# Patient Record
Sex: FEMALE | Race: WHITE | Employment: OTHER | URBAN - METROPOLITAN AREA
[De-identification: names, ages, dates, MRNs, and addresses within clinical notes are randomized per-mention and may not be internally consistent; named-entity substitution may affect disease eponyms.]

---

## 2017-01-12 ENCOUNTER — OFFICE VISIT (OUTPATIENT)
Dept: DERMATOLOGY | Age: 57
End: 2017-01-12

## 2017-01-12 DIAGNOSIS — L21.9 SEBORRHEIC DERMATITIS: ICD-10-CM

## 2017-01-12 DIAGNOSIS — D48.5 NEOPLASM OF UNCERTAIN BEHAVIOR OF SKIN: Primary | ICD-10-CM

## 2017-01-12 DIAGNOSIS — D22.9 BENIGN NEVUS: ICD-10-CM

## 2017-01-12 DIAGNOSIS — L82.0 SEBORRHEIC KERATOSES, INFLAMED: ICD-10-CM

## 2017-01-12 PROCEDURE — 99213 OFFICE O/P EST LOW 20 MIN: CPT | Performed by: DERMATOLOGY

## 2017-01-12 PROCEDURE — 17110 DESTRUCTION B9 LES UP TO 14: CPT | Performed by: DERMATOLOGY

## 2017-01-27 ENCOUNTER — HOSPITAL ENCOUNTER (OUTPATIENT)
Dept: MAMMOGRAPHY | Age: 57
Discharge: HOME | End: 2017-01-27
Attending: OBSTETRICS & GYNECOLOGY | Admitting: OBSTETRICS & GYNECOLOGY

## 2017-01-27 DIAGNOSIS — H92.10 OTORRHEA: ICD-10-CM

## 2017-01-27 DIAGNOSIS — Z12.31 ENCOUNTER FOR SCREENING MAMMOGRAM FOR BREAST CANCER: ICD-10-CM

## 2017-02-04 DIAGNOSIS — I10 UNSPECIFIED ESSENTIAL HYPERTENSION: ICD-10-CM

## 2017-02-04 DIAGNOSIS — E78.00 HYPERCHOLESTEREMIA: ICD-10-CM

## 2017-02-06 RX ORDER — VALSARTAN 40 MG/1
TABLET ORAL
Qty: 90 TABLET | Refills: 2 | Status: SHIPPED | OUTPATIENT
Start: 2017-02-06 | End: 2017-10-30 | Stop reason: SDUPTHER

## 2017-02-06 RX ORDER — ATORVASTATIN CALCIUM 20 MG/1
TABLET, FILM COATED ORAL
Qty: 30 TABLET | Refills: 2 | Status: SHIPPED | OUTPATIENT
Start: 2017-02-06 | End: 2017-05-09 | Stop reason: SDUPTHER

## 2017-02-06 RX ORDER — POTASSIUM CHLORIDE 1500 MG/1
TABLET, EXTENDED RELEASE ORAL
Qty: 90 TABLET | Refills: 2 | Status: SHIPPED | OUTPATIENT
Start: 2017-02-06 | End: 2017-10-30 | Stop reason: SDUPTHER

## 2017-02-06 RX ORDER — FUROSEMIDE 40 MG/1
TABLET ORAL
Qty: 90 TABLET | Refills: 2 | Status: SHIPPED | OUTPATIENT
Start: 2017-02-06 | End: 2017-10-30 | Stop reason: SDUPTHER

## 2017-02-09 ENCOUNTER — OFFICE VISIT (OUTPATIENT)
Dept: DERMATOLOGY | Age: 57
End: 2017-02-09

## 2017-02-09 DIAGNOSIS — D48.5 NEOPLASM OF UNCERTAIN BEHAVIOR OF SKIN: Primary | ICD-10-CM

## 2017-02-09 DIAGNOSIS — L21.9 SEBORRHEIC DERMATITIS: ICD-10-CM

## 2017-02-09 DIAGNOSIS — L72.0 MILIUM: ICD-10-CM

## 2017-02-09 DIAGNOSIS — D86.9 SARCOID: ICD-10-CM

## 2017-02-09 DIAGNOSIS — L57.0 AK (ACTINIC KERATOSIS): ICD-10-CM

## 2017-02-09 PROCEDURE — 11100 PR BIOPSY OF SKIN LESION: CPT | Performed by: DERMATOLOGY

## 2017-02-09 PROCEDURE — 17000 DESTRUCT PREMALG LESION: CPT | Performed by: DERMATOLOGY

## 2017-02-09 PROCEDURE — 99213 OFFICE O/P EST LOW 20 MIN: CPT | Performed by: DERMATOLOGY

## 2017-02-10 ENCOUNTER — TELEPHONE (OUTPATIENT)
Dept: DERMATOLOGY | Age: 57
End: 2017-02-10

## 2017-02-10 DIAGNOSIS — R21 RASH: Primary | ICD-10-CM

## 2017-02-15 LAB
ANAEROBIC CULTURE: NORMAL
GRAM STAIN RESULT: NORMAL
WOUND/ABSCESS: NORMAL

## 2017-02-16 ENCOUNTER — TELEPHONE (OUTPATIENT)
Dept: DERMATOLOGY | Age: 57
End: 2017-02-16

## 2017-02-17 ENCOUNTER — HOSPITAL ENCOUNTER (OUTPATIENT)
Dept: OTHER | Age: 57
Discharge: HOME | End: 2017-02-17
Attending: DERMATOLOGY | Admitting: DERMATOLOGY

## 2017-02-17 DIAGNOSIS — D86.9 SARCOID: ICD-10-CM

## 2017-02-17 LAB
A/G RATIO: 1.4 (ref 1.1–2.2)
ALBUMIN SERPL-MCNC: 4 G/DL (ref 3.4–5)
ALP BLD-CCNC: 63 U/L (ref 40–129)
ALT SERPL-CCNC: 18 U/L (ref 10–40)
ANION GAP SERPL CALCULATED.3IONS-SCNC: 10 MMOL/L (ref 3–16)
AST SERPL-CCNC: 25 U/L (ref 15–37)
BASOPHILS ABSOLUTE: 0 K/UL (ref 0–0.2)
BASOPHILS RELATIVE PERCENT: 0.5 %
BILIRUB SERPL-MCNC: 0.4 MG/DL (ref 0–1)
BUN BLDV-MCNC: 16 MG/DL (ref 7–20)
CALCIUM SERPL-MCNC: 9.4 MG/DL (ref 8.3–10.6)
CHLORIDE BLD-SCNC: 105 MMOL/L (ref 99–110)
CO2: 29 MMOL/L (ref 21–32)
CREAT SERPL-MCNC: 0.8 MG/DL (ref 0.6–1.1)
EOSINOPHILS ABSOLUTE: 0.1 K/UL (ref 0–0.6)
EOSINOPHILS RELATIVE PERCENT: 2.6 %
GFR AFRICAN AMERICAN: >60
GFR NON-AFRICAN AMERICAN: >60
GLOBULIN: 2.9 G/DL
GLUCOSE BLD-MCNC: 92 MG/DL (ref 70–99)
HCT VFR BLD CALC: 39.3 % (ref 36–48)
HEMOGLOBIN: 13.1 G/DL (ref 12–16)
LYMPHOCYTES ABSOLUTE: 1.6 K/UL (ref 1–5.1)
LYMPHOCYTES RELATIVE PERCENT: 33.6 %
MCH RBC QN AUTO: 27.6 PG (ref 26–34)
MCHC RBC AUTO-ENTMCNC: 33.4 G/DL (ref 31–36)
MCV RBC AUTO: 82.8 FL (ref 80–100)
MONOCYTES ABSOLUTE: 0.6 K/UL (ref 0–1.3)
MONOCYTES RELATIVE PERCENT: 12.1 %
NEUTROPHILS ABSOLUTE: 2.4 K/UL (ref 1.7–7.7)
NEUTROPHILS RELATIVE PERCENT: 51.2 %
PDW BLD-RTO: 13.4 % (ref 12.4–15.4)
PLATELET # BLD: 273 K/UL (ref 135–450)
PMV BLD AUTO: 7.6 FL (ref 5–10.5)
POTASSIUM SERPL-SCNC: 4.1 MMOL/L (ref 3.5–5.1)
RBC # BLD: 4.74 M/UL (ref 4–5.2)
SODIUM BLD-SCNC: 144 MMOL/L (ref 136–145)
TOTAL PROTEIN: 6.9 G/DL (ref 6.4–8.2)
WBC # BLD: 4.8 K/UL (ref 4–11)

## 2017-02-18 LAB — ANGIOTENSIN CONVERTING ENZYME: 46 U/L (ref 9–67)

## 2017-03-01 ENCOUNTER — HOSPITAL ENCOUNTER (OUTPATIENT)
Dept: CT IMAGING | Age: 57
Discharge: HOME | End: 2017-03-01
Attending: INTERNAL MEDICINE | Admitting: INTERNAL MEDICINE

## 2017-03-01 DIAGNOSIS — C18.2 MALIGNANT NEOPLASM OF ASCENDING COLON (HCC): ICD-10-CM

## 2017-03-01 DIAGNOSIS — C18.2 CANCER OF ASCENDING COLON (HCC): ICD-10-CM

## 2017-03-03 ENCOUNTER — OFFICE VISIT (OUTPATIENT)
Dept: FAMILY MEDICINE CLINIC | Age: 57
End: 2017-03-03

## 2017-03-03 VITALS
RESPIRATION RATE: 12 BRPM | HEART RATE: 81 BPM | BODY MASS INDEX: 26.94 KG/M2 | WEIGHT: 148.5 LBS | OXYGEN SATURATION: 98 % | DIASTOLIC BLOOD PRESSURE: 74 MMHG | SYSTOLIC BLOOD PRESSURE: 114 MMHG

## 2017-03-03 DIAGNOSIS — I10 ESSENTIAL HYPERTENSION: ICD-10-CM

## 2017-03-03 DIAGNOSIS — D86.3 CUTANEOUS SARCOIDOSIS (HCC): Primary | ICD-10-CM

## 2017-03-03 PROCEDURE — 93000 ELECTROCARDIOGRAM COMPLETE: CPT | Performed by: FAMILY MEDICINE

## 2017-03-03 PROCEDURE — 99214 OFFICE O/P EST MOD 30 MIN: CPT | Performed by: FAMILY MEDICINE

## 2017-03-08 ENCOUNTER — TELEPHONE (OUTPATIENT)
Dept: DERMATOLOGY | Age: 57
End: 2017-03-08

## 2017-03-08 ENCOUNTER — OFFICE VISIT (OUTPATIENT)
Dept: DERMATOLOGY | Age: 57
End: 2017-03-08

## 2017-03-08 DIAGNOSIS — D86.9 SARCOID: Primary | ICD-10-CM

## 2017-03-08 DIAGNOSIS — L21.9 SEBORRHEIC DERMATITIS: ICD-10-CM

## 2017-03-08 PROCEDURE — 99213 OFFICE O/P EST LOW 20 MIN: CPT | Performed by: DERMATOLOGY

## 2017-03-08 RX ORDER — TRIAMCINOLONE ACETONIDE 0.25 MG/G
CREAM TOPICAL
Qty: 30 G | Refills: 2 | Status: SHIPPED | OUTPATIENT
Start: 2017-03-08

## 2017-03-10 ENCOUNTER — HOSPITAL ENCOUNTER (OUTPATIENT)
Dept: ENDOSCOPY | Age: 57
Discharge: HOME | End: 2017-03-10
Attending: INTERNAL MEDICINE | Admitting: INTERNAL MEDICINE

## 2017-03-13 LAB — FUNGUS (MYCOLOGY) CULTURE: NORMAL

## 2017-03-28 LAB
AFB CULTURE (MYCOBACTERIA): NORMAL
AFB SMEAR: NORMAL

## 2017-04-06 ENCOUNTER — OFFICE VISIT (OUTPATIENT)
Dept: SURGERY | Age: 57
End: 2017-04-06

## 2017-04-06 VITALS — WEIGHT: 150 LBS | BODY MASS INDEX: 27.44 KG/M2 | DIASTOLIC BLOOD PRESSURE: 72 MMHG | SYSTOLIC BLOOD PRESSURE: 126 MMHG

## 2017-04-06 DIAGNOSIS — C18.2 CANCER OF ASCENDING COLON (HCC): Primary | ICD-10-CM

## 2017-04-06 PROCEDURE — 99213 OFFICE O/P EST LOW 20 MIN: CPT | Performed by: SURGERY

## 2017-04-20 ENCOUNTER — TELEPHONE (OUTPATIENT)
Dept: FAMILY MEDICINE CLINIC | Age: 57
End: 2017-04-20

## 2017-04-20 RX ORDER — CEPHALEXIN 500 MG/1
500 CAPSULE ORAL 3 TIMES DAILY
Qty: 30 CAPSULE | Refills: 0 | Status: SHIPPED | OUTPATIENT
Start: 2017-04-20 | End: 2017-05-11

## 2017-05-01 ENCOUNTER — OFFICE VISIT (OUTPATIENT)
Dept: DERMATOLOGY | Age: 57
End: 2017-05-01

## 2017-05-01 ENCOUNTER — TELEPHONE (OUTPATIENT)
Dept: DERMATOLOGY | Age: 57
End: 2017-05-01

## 2017-05-01 DIAGNOSIS — Z85.828 HISTORY OF BASAL CELL CANCER: ICD-10-CM

## 2017-05-01 DIAGNOSIS — L57.0 AK (ACTINIC KERATOSIS): ICD-10-CM

## 2017-05-01 DIAGNOSIS — L21.9 SEBORRHEIC DERMATITIS: ICD-10-CM

## 2017-05-01 DIAGNOSIS — D48.5 NEOPLASM OF UNCERTAIN BEHAVIOR OF SKIN: Primary | ICD-10-CM

## 2017-05-01 DIAGNOSIS — D86.9 SARCOID: ICD-10-CM

## 2017-05-01 PROCEDURE — 99214 OFFICE O/P EST MOD 30 MIN: CPT | Performed by: DERMATOLOGY

## 2017-05-01 PROCEDURE — 17000 DESTRUCT PREMALG LESION: CPT | Performed by: DERMATOLOGY

## 2017-05-01 PROCEDURE — 11900 INJECT SKIN LESIONS </W 7: CPT | Performed by: DERMATOLOGY

## 2017-05-01 PROCEDURE — 17003 DESTRUCT PREMALG LES 2-14: CPT | Performed by: DERMATOLOGY

## 2017-05-01 PROCEDURE — 11100 PR BIOPSY OF SKIN LESION: CPT | Performed by: DERMATOLOGY

## 2017-05-01 PROCEDURE — 11101 PR BIOPSY, EACH ADDED LESION: CPT | Performed by: DERMATOLOGY

## 2017-05-03 ENCOUNTER — TELEPHONE (OUTPATIENT)
Dept: DERMATOLOGY | Age: 57
End: 2017-05-03

## 2017-05-08 ENCOUNTER — TELEPHONE (OUTPATIENT)
Dept: DERMATOLOGY | Age: 57
End: 2017-05-08

## 2017-05-09 DIAGNOSIS — E78.00 HYPERCHOLESTEREMIA: ICD-10-CM

## 2017-05-09 RX ORDER — ATORVASTATIN CALCIUM 20 MG/1
TABLET, FILM COATED ORAL
Qty: 30 TABLET | Refills: 0 | Status: SHIPPED | OUTPATIENT
Start: 2017-05-09 | End: 2017-05-11 | Stop reason: SDUPTHER

## 2017-05-11 ENCOUNTER — OFFICE VISIT (OUTPATIENT)
Dept: FAMILY MEDICINE CLINIC | Age: 57
End: 2017-05-11

## 2017-05-11 VITALS
DIASTOLIC BLOOD PRESSURE: 70 MMHG | OXYGEN SATURATION: 98 % | BODY MASS INDEX: 27.34 KG/M2 | SYSTOLIC BLOOD PRESSURE: 100 MMHG | HEART RATE: 88 BPM | WEIGHT: 149.5 LBS | RESPIRATION RATE: 16 BRPM

## 2017-05-11 DIAGNOSIS — C18.2 CANCER OF ASCENDING COLON (HCC): ICD-10-CM

## 2017-05-11 DIAGNOSIS — E04.9 GOITER: ICD-10-CM

## 2017-05-11 DIAGNOSIS — I10 ESSENTIAL HYPERTENSION: Primary | ICD-10-CM

## 2017-05-11 DIAGNOSIS — E78.00 HYPERCHOLESTEREMIA: ICD-10-CM

## 2017-05-11 DIAGNOSIS — D86.3 CUTANEOUS SARCOIDOSIS (HCC): ICD-10-CM

## 2017-05-11 PROCEDURE — 99214 OFFICE O/P EST MOD 30 MIN: CPT | Performed by: FAMILY MEDICINE

## 2017-05-11 RX ORDER — ATORVASTATIN CALCIUM 20 MG/1
TABLET, FILM COATED ORAL
Qty: 30 TABLET | Refills: 11 | Status: SHIPPED | OUTPATIENT
Start: 2017-05-11 | End: 2018-05-14 | Stop reason: SDUPTHER

## 2017-05-11 ASSESSMENT — PATIENT HEALTH QUESTIONNAIRE - PHQ9
SUM OF ALL RESPONSES TO PHQ QUESTIONS 1-9: 0
2. FEELING DOWN, DEPRESSED OR HOPELESS: 0
SUM OF ALL RESPONSES TO PHQ9 QUESTIONS 1 & 2: 0
1. LITTLE INTEREST OR PLEASURE IN DOING THINGS: 0

## 2017-05-25 ENCOUNTER — OFFICE VISIT (OUTPATIENT)
Dept: DERMATOLOGY | Age: 57
End: 2017-05-25

## 2017-05-25 DIAGNOSIS — C44.519 BASAL CELL CARCINOMA OF CHEST: ICD-10-CM

## 2017-05-25 DIAGNOSIS — L72.0 MILIUM: ICD-10-CM

## 2017-05-25 DIAGNOSIS — D86.9 SARCOID: ICD-10-CM

## 2017-05-25 DIAGNOSIS — L57.0 AK (ACTINIC KERATOSIS): Primary | ICD-10-CM

## 2017-05-25 PROCEDURE — 17000 DESTRUCT PREMALG LESION: CPT | Performed by: DERMATOLOGY

## 2017-05-25 PROCEDURE — 17261 DSTRJ MAL LES T/A/L .6-1.0CM: CPT | Performed by: DERMATOLOGY

## 2017-05-25 PROCEDURE — 17110 DESTRUCTION B9 LES UP TO 14: CPT | Performed by: DERMATOLOGY

## 2017-05-25 PROCEDURE — 11900 INJECT SKIN LESIONS </W 7: CPT | Performed by: DERMATOLOGY

## 2017-09-25 ENCOUNTER — OFFICE VISIT (OUTPATIENT)
Dept: DERMATOLOGY | Age: 57
End: 2017-09-25

## 2017-09-25 DIAGNOSIS — D86.9 SARCOID: ICD-10-CM

## 2017-09-25 DIAGNOSIS — L57.8 DIFFUSE PHOTODAMAGE OF SKIN: ICD-10-CM

## 2017-09-25 DIAGNOSIS — L57.0 AK (ACTINIC KERATOSIS): ICD-10-CM

## 2017-09-25 DIAGNOSIS — L72.0 MILIUM: ICD-10-CM

## 2017-09-25 DIAGNOSIS — Z85.828 HISTORY OF BASAL CELL CANCER: ICD-10-CM

## 2017-09-25 DIAGNOSIS — L82.0 SEBORRHEIC KERATOSES, INFLAMED: Primary | ICD-10-CM

## 2017-09-25 PROCEDURE — 17110 DESTRUCTION B9 LES UP TO 14: CPT | Performed by: DERMATOLOGY

## 2017-09-25 PROCEDURE — 17000 DESTRUCT PREMALG LESION: CPT | Performed by: DERMATOLOGY

## 2017-09-25 PROCEDURE — 99213 OFFICE O/P EST LOW 20 MIN: CPT | Performed by: DERMATOLOGY

## 2017-10-09 ENCOUNTER — TELEPHONE (OUTPATIENT)
Dept: DERMATOLOGY | Age: 57
End: 2017-10-09

## 2017-10-09 NOTE — TELEPHONE ENCOUNTER
Pt c/b [phone]  Pt states:   - a little over a wk   - rt foot last three toes   - are possible swollen   - hurts and itching non stop  Please call to discuss thanks

## 2017-10-10 NOTE — TELEPHONE ENCOUNTER
Patient called back and she found something over the counter and it seems to be working.   No need for appointment on 10/11

## 2017-10-30 DIAGNOSIS — I10 ESSENTIAL HYPERTENSION: ICD-10-CM

## 2017-10-30 RX ORDER — VALSARTAN 40 MG/1
TABLET ORAL
Qty: 90 TABLET | Refills: 1 | Status: SHIPPED | OUTPATIENT
Start: 2017-10-30 | End: 2018-04-25 | Stop reason: SDUPTHER

## 2017-10-30 RX ORDER — FUROSEMIDE 40 MG/1
TABLET ORAL
Qty: 90 TABLET | Refills: 1 | Status: SHIPPED | OUTPATIENT
Start: 2017-10-30 | End: 2018-04-25 | Stop reason: SDUPTHER

## 2017-10-30 RX ORDER — POTASSIUM CHLORIDE 20 MEQ/1
TABLET, EXTENDED RELEASE ORAL
Qty: 90 TABLET | Refills: 1 | Status: SHIPPED | OUTPATIENT
Start: 2017-10-30 | End: 2018-04-25 | Stop reason: SDUPTHER

## 2017-11-13 ENCOUNTER — OFFICE VISIT (OUTPATIENT)
Dept: FAMILY MEDICINE CLINIC | Age: 57
End: 2017-11-13

## 2017-11-13 VITALS
HEART RATE: 71 BPM | BODY MASS INDEX: 27.49 KG/M2 | OXYGEN SATURATION: 97 % | SYSTOLIC BLOOD PRESSURE: 114 MMHG | RESPIRATION RATE: 12 BRPM | HEIGHT: 62 IN | WEIGHT: 149.38 LBS | DIASTOLIC BLOOD PRESSURE: 78 MMHG

## 2017-11-13 DIAGNOSIS — E04.9 GOITER: ICD-10-CM

## 2017-11-13 DIAGNOSIS — I10 ESSENTIAL HYPERTENSION: ICD-10-CM

## 2017-11-13 DIAGNOSIS — H81.09 MENIERE'S DISEASE, UNSPECIFIED LATERALITY: ICD-10-CM

## 2017-11-13 DIAGNOSIS — E78.00 HYPERCHOLESTEREMIA: Primary | ICD-10-CM

## 2017-11-13 PROCEDURE — 99214 OFFICE O/P EST MOD 30 MIN: CPT | Performed by: FAMILY MEDICINE

## 2017-11-13 NOTE — PROGRESS NOTES
Subjective:      Patient ID: Alec Harrington is a 61 y.o. female. HPI Patient presents for re-evaluation of chronic health problems. Patient overall feels she's done extremely well. She was recently evaluated by oncology and no change of therapy. Patient had recent dermatology evaluation no new skin lesions. Patient feels good. She continues with her exercise program on a daily basis. She enjoys watching her grandchildren daily. Patient is very compliant with medications with no adverse reactions. Patient had no further flareups of her Ménière's disease. Review of Systems  Patient Active Problem List   Diagnosis    Ulcerative colitis ([facility]    Essential hypertension    Goiter    Irritable bowel syndrome    Meniere's disease    Hypercholesteremia    Cancer of ascending colon ([facility]    Gastroesophageal reflux disease without esophagitis    [facility]       Outpatient Prescriptions Marked as Taking for the 11/13/17 encounter (Office Visit) with Falguni Wisdom MD   Medication Sig Dispense Refill    potassium chloride (KLOR-CON M) 20 MEQ extended release tablet TAKE ONE TABLET BY MOUTH DAILY 90 tablet 1    furosemide (LASIX) 40 MG tablet TAKE ONE TABLET BY MOUTH DAILY 90 tablet 1    valsartan (DIOVAN) 40 MG tablet TAKE ONE TABLET BY MOUTH DAILY 90 tablet 1    CIPRODEX 0.3-0.1 % otic suspension       atorvastatin (LIPITOR) 20 MG tablet TAKE 1 TABLET BY MOUTH ONE TIME A DAY 30 tablet 11    triamcinolone (KENALOG) 0.025 % cream Apply to affected area BID PRN flares 30 g 2    ranitidine (ZANTAC) 150 MG tablet Take 150 mg by mouth daily      aspirin 325 MG tablet Take 325 mg by mouth daily      ibuprofen (ADVIL;MOTRIN) 200 MG tablet Take 200 mg by mouth every 6 hours as needed for Pain      loratadine (CLARITIN) 10 MG tablet Take 10 mg by mouth daily.  Multiple Vitamins-Minerals (CENTRUM CARDIO) TABS Take  by mouth daily.       psyllium (METAMUCIL) 0.52 G capsule Take by mouth 2 times daily       calcium carbonate (OSCAL) 500 MG TABS tablet Take 1,200 mg by mouth daily          Allergies   Allergen Reactions    Ace Inhibitors      Lip swelling    Lisinopril Hives    Penicillins Hives    Bacitracin Rash    Morphine Nausea And Vomiting     Violently ill       Social History   Substance Use Topics    Smoking status: Never Smoker    Smokeless tobacco: Never Used    Alcohol use 0.0 oz/week      Comment: socially       /78 (Site: Left Arm, Position: Sitting, Cuff Size: Large Adult)   Pulse 71   Resp 12   Ht 5' 2\" (1.575 m)   Wt 149 lb 6 oz (67.8 kg)   SpO2 97%   BMI 27.32 kg/m²     Objective:   Physical Exam   Constitutional: She appears well-developed and well-nourished. She is cooperative. Neck: Carotid bruit is not present. No thyromegaly present. Cardiovascular: Normal rate, regular rhythm and normal heart sounds. No murmur heard. Pulses:       Dorsalis pedis pulses are 2+ on the right side, and 2+ on the left side. Posterior tibial pulses are 2+ on the right side, and 2+ on the left side. Pulmonary/Chest: Effort normal and breath sounds normal.   Abdominal: Soft. Normal appearance and bowel sounds are normal. She exhibits no distension and no mass. There is no hepatosplenomegaly. There is no tenderness. There is no rigidity, no rebound, no guarding and no CVA tenderness. No hernia. Musculoskeletal: She exhibits no edema. Neurological: She is alert. Assessment:      Dimas Nick was seen today for follow-up. Diagnoses and all orders for this visit:    Hypercholesteremia    Essential hypertension    Goiter    Meniere's disease, unspecified laterality            Plan:      Pt appears stable & reviewed labs with patient. Patient received counseling on the following healthy behaviors: nutrition and exercise     Patient given educational materials     Discussed use, benefit, and side effects of prescribed medications.   Barriers to medication

## 2018-02-19 ENCOUNTER — HOSPITAL ENCOUNTER (OUTPATIENT)
Dept: CT IMAGING | Age: 58
Discharge: HOME | End: 2018-02-19
Attending: NURSE PRACTITIONER | Admitting: NURSE PRACTITIONER

## 2018-02-19 DIAGNOSIS — C18.2 MALIGNANT TUMOR OF ASCENDING COLON (HCC): ICD-10-CM

## 2018-02-19 DIAGNOSIS — C18.2 MALIGNANT NEOPLASM OF ASCENDING COLON (HCC): ICD-10-CM

## 2018-02-20 ENCOUNTER — OFFICE VISIT (OUTPATIENT)
Dept: FAMILY MEDICINE CLINIC | Age: 58
End: 2018-02-20

## 2018-02-20 VITALS
OXYGEN SATURATION: 96 % | RESPIRATION RATE: 16 BRPM | TEMPERATURE: 101 F | WEIGHT: 153.5 LBS | BODY MASS INDEX: 28.08 KG/M2 | HEART RATE: 98 BPM | DIASTOLIC BLOOD PRESSURE: 70 MMHG | SYSTOLIC BLOOD PRESSURE: 100 MMHG

## 2018-02-20 DIAGNOSIS — J11.1 INFLUENZA: Primary | ICD-10-CM

## 2018-02-20 PROCEDURE — 99213 OFFICE O/P EST LOW 20 MIN: CPT | Performed by: FAMILY MEDICINE

## 2018-02-20 RX ORDER — OSELTAMIVIR PHOSPHATE 75 MG/1
75 CAPSULE ORAL 2 TIMES DAILY
Qty: 10 CAPSULE | Refills: 0 | Status: SHIPPED | OUTPATIENT
Start: 2018-02-20 | End: 2018-02-25

## 2018-02-20 RX ORDER — ALBUTEROL SULFATE 90 UG/1
2 AEROSOL, METERED RESPIRATORY (INHALATION) EVERY 6 HOURS PRN
Qty: 1 INHALER | Refills: 3 | Status: SHIPPED | OUTPATIENT
Start: 2018-02-20 | End: 2019-07-12 | Stop reason: SDUPTHER

## 2018-02-20 ASSESSMENT — ENCOUNTER SYMPTOMS
SORE THROAT: 1
WHEEZING: 1
COUGH: 1

## 2018-02-20 NOTE — PROGRESS NOTES
Subjective:      Patient ID: Jcarlos Aviles is a 61 y.o. female. Patient presents for acute medical problem. Medical assistant notes reviewed. Cough   This is a new problem. The current episode started yesterday. The problem has been gradually worsening. The problem occurs constantly. The cough is non-productive. Associated symptoms include chills, a fever, headaches, nasal congestion, a sore throat and wheezing. The symptoms are aggravated by cold air and lying down. Treatments tried: ibuprofen, mucinex, cough med  The treatment provided no relief. Her past medical history is significant for asthma. Patient started becoming ill yesterday with fevers and chills. She's been having headaches and sore throat as well. Patient did use her albuterol inhaler with some improvement during the night. Review of Systems   Constitutional: Positive for chills and fever. HENT: Positive for sore throat. Respiratory: Positive for cough and wheezing. Neurological: Positive for headaches. Allergies   Allergen Reactions    Ace Inhibitors      Lip swelling    Lisinopril Hives    Penicillins Hives    Bacitracin Rash    Morphine Nausea And Vomiting     Violently ill     Objective:   Physical Exam   Constitutional: She appears well-developed and well-nourished. She is cooperative. HENT:   Right Ear: Tympanic membrane and ear canal normal.   Left Ear: Tympanic membrane and ear canal normal.   Nose: Mucosal edema and rhinorrhea present. Right sinus exhibits no maxillary sinus tenderness and no frontal sinus tenderness. Left sinus exhibits no maxillary sinus tenderness and no frontal sinus tenderness. Mouth/Throat: Oropharynx is clear and moist and mucous membranes are normal.   Neck: Neck supple. Pulmonary/Chest: Effort normal and breath sounds normal. No respiratory distress. Lymphadenopathy:     She has no cervical adenopathy. Neurological: She is alert.        Assessment:      [facility] was seen today for

## 2018-02-26 ENCOUNTER — HOSPITAL ENCOUNTER (OUTPATIENT)
Dept: MAMMOGRAPHY | Age: 58
Discharge: HOME | End: 2018-02-26
Attending: OBSTETRICS & GYNECOLOGY | Admitting: OBSTETRICS & GYNECOLOGY

## 2018-02-26 DIAGNOSIS — H92.10 OTORRHEA: ICD-10-CM

## 2018-02-26 DIAGNOSIS — Z12.31 ENCOUNTER FOR SCREENING MAMMOGRAM FOR BREAST CANCER: ICD-10-CM

## 2018-02-28 ENCOUNTER — TELEPHONE (OUTPATIENT)
Dept: FAMILY MEDICINE CLINIC | Age: 58
End: 2018-02-28

## 2018-02-28 RX ORDER — CEPHALEXIN 500 MG/1
500 CAPSULE ORAL 3 TIMES DAILY
Qty: 30 CAPSULE | Refills: 0 | Status: SHIPPED | OUTPATIENT
Start: 2018-02-28 | End: 2018-03-10

## 2018-03-09 ENCOUNTER — HOSPITAL ENCOUNTER (OUTPATIENT)
Dept: ENDOSCOPY | Age: 58
Discharge: HOME | End: 2018-03-09
Attending: INTERNAL MEDICINE | Admitting: INTERNAL MEDICINE

## 2018-03-09 NOTE — ANESTHESIA POST-OP
Anesthesia Post-op Note    Patient: Devang Hinds  MRN: [id number]  YOB: 1960  Date of evaluation: 3/9/2018  Time:  9:44 AM     Procedure(s) Performed:     Last Vitals: There were no vitals taken for this visit.     Conchis Phase I:      Conchis Phase II:      Anesthesia Post Evaluation    Final anesthesia type: MAC  Patient location during evaluation: PACU  Patient participation: complete - patient participated  Level of consciousness: awake and alert  Airway patency: patent  Nausea & Vomiting: no nausea and no vomiting  Complications: no  Cardiovascular status: hemodynamically stable  Respiratory status: acceptable  Hydration status: stable        Kathia MD Tim  9:44 AM

## 2018-03-09 NOTE — ANESTHESIA PRE-OP
Department of Anesthesiology  Preprocedure Note       Name:  Mira Youngblood   Age:  61 y.o.  :  1960                                          MRN:  [id number]         Date:  3/9/2018      Surgeon:    Procedure:    Medications prior to admission:   Prior to Admission medications    Medication Sig Start Date End Date Taking? Authorizing Provider   cephALEXin (KEFLEX) 500 MG capsule Take 1 capsule by mouth 3 times daily for 10 days 2/28/18 3/10/18  Jasper West MD   albuterol sulfate HFA (PROAIR HFA) 108 (90 Base) MCG/ACT inhaler Inhale 2 puffs into the lungs every 6 hours as needed for Wheezing 18   Jasper West MD   potassium chloride (KLOR-CON M) 20 MEQ extended release tablet TAKE ONE TABLET BY MOUTH DAILY 10/30/17   Jasper West MD   furosemide (LASIX) 40 MG tablet TAKE ONE TABLET BY MOUTH DAILY 10/30/17   Jasper West MD   valsartan (DIOVAN) 40 MG tablet TAKE ONE TABLET BY MOUTH DAILY 10/30/17   Jasper West MD   CIPRODEX 0.3-0.1 % otic suspension  17   Historical Provider, MD   atorvastatin (LIPITOR) 20 MG tablet TAKE 1 TABLET BY MOUTH ONE TIME A DAY 17   Jasper West MD   triamcinolone (KENALOG) 0.025 % cream Apply to affected area BID PRN flares 3/8/17   Kaela Kimble MD   ranitidine (ZANTAC) 150 MG tablet Take 150 mg by mouth daily    Historical Provider, MD   aspirin 325 MG tablet Take 325 mg by mouth daily    Historical Provider, MD   ibuprofen (ADVIL;MOTRIN) 200 MG tablet Take 200 mg by mouth every 6 hours as needed for Pain    Historical Provider, MD   loratadine (CLARITIN) 10 MG tablet Take 10 mg by mouth daily. Historical Provider, MD   Multiple Vitamins-Minerals (CENTRUM CARDIO) TABS Take  by mouth daily.     Historical Provider, MD   psyllium (METAMUCIL) 0.52 G capsule Take by mouth 2 times daily     Historical Provider, MD   calcium carbonate (OSCAL) 500 MG TABS tablet Take 1,200 mg by mouth daily     Historical Provider, MD       Current Meniere's disease H81.09    Hypercholesteremia E78.00    Cancer of ascending colon (HCC) C18.2    Gastroesophageal reflux disease without esophagitis K21.9    Cutaneous sarcoidosis ([facility]) D86.3       Past Medical History:        Diagnosis Date    Allergic     Anemia     Arthritis     Asthma     Cancer ([facility] [facility]     colon    Colon cancer ([facility] [facility]     GERD (gastroesophageal reflux disease)     Hearing loss     deaf right ear    Hyperlipidemia     Hypertension     IBS (irritable bowel syndrome)     Migraine     PONV (postoperative nausea and vomiting)        Past Surgical History:        Procedure Laterality Date     SECTION      x2    CHOLECYSTECTOMY      HYSTERECTOMY      total, with ovaries    KNEE SURGERY      MIDDLE EAR SURGERY      multiple    OTHER SURGICAL HISTORY      basal cell carcinoma, RT arm    RIGHT COLECTOMY  3/16/16    laparoscopic     SEPTOPLASTY         Social History:    Social History   Substance Use Topics    Smoking status: Never Smoker    Smokeless tobacco: Never Used    Alcohol use 0.0 oz/week      Comment: socially                                Counseling given: Not Answered      Vital Signs (Current): There were no vitals filed for this visit. BP Readings from Last 3 Encounters:   18 100/70   17 114/78   06/15/17 128/80       NPO Status:                                                                                 BMI:   Wt Readings from Last 3 Encounters:   18 153 lb 8 oz (69.6 kg)   17 149 lb 6 oz (67.8 kg)   06/15/17 152 lb (68.9 kg)     There is no height or weight on file to calculate BMI. Anesthesia Evaluation  Patient summary reviewed and Nursing notes reviewed   history of anesthetic complications: PONV.   Airway: Mallampati: III  TM distance: >3 FB   Neck ROM: full  Mouth opening: > = 3 FB Dental:    (+) upper dentures and partials      Pulmonary:normal exam  breath sounds clear to

## 2018-04-05 ENCOUNTER — OFFICE VISIT (OUTPATIENT)
Dept: SURGERY | Age: 58
End: 2018-04-05

## 2018-04-05 VITALS — BODY MASS INDEX: 29.26 KG/M2 | SYSTOLIC BLOOD PRESSURE: 102 MMHG | WEIGHT: 160 LBS | DIASTOLIC BLOOD PRESSURE: 68 MMHG

## 2018-04-05 DIAGNOSIS — C18.2 CANCER OF ASCENDING COLON (HCC): Primary | ICD-10-CM

## 2018-04-05 PROCEDURE — 99214 OFFICE O/P EST MOD 30 MIN: CPT | Performed by: SURGERY

## 2018-04-25 DIAGNOSIS — I10 ESSENTIAL HYPERTENSION: ICD-10-CM

## 2018-04-26 RX ORDER — VALSARTAN 40 MG/1
TABLET ORAL
Qty: 90 TABLET | Refills: 0 | Status: SHIPPED | OUTPATIENT
Start: 2018-04-26 | End: 2018-07-30 | Stop reason: SDUPTHER

## 2018-04-26 RX ORDER — POTASSIUM CHLORIDE 20 MEQ/1
TABLET, EXTENDED RELEASE ORAL
Qty: 90 TABLET | Refills: 0 | Status: SHIPPED | OUTPATIENT
Start: 2018-04-26 | End: 2018-07-30 | Stop reason: SDUPTHER

## 2018-04-26 RX ORDER — FUROSEMIDE 40 MG/1
TABLET ORAL
Qty: 90 TABLET | Refills: 0 | Status: SHIPPED | OUTPATIENT
Start: 2018-04-26 | End: 2018-07-30 | Stop reason: SDUPTHER

## 2018-05-11 ENCOUNTER — TELEPHONE (OUTPATIENT)
Dept: FAMILY MEDICINE CLINIC | Age: 58
End: 2018-05-11

## 2018-05-11 RX ORDER — CEPHALEXIN 500 MG/1
500 CAPSULE ORAL 3 TIMES DAILY
Qty: 30 CAPSULE | Refills: 0 | Status: SHIPPED | OUTPATIENT
Start: 2018-05-11 | End: 2018-05-21

## 2018-05-13 DIAGNOSIS — E78.00 HYPERCHOLESTEREMIA: ICD-10-CM

## 2018-05-13 RX ORDER — ATORVASTATIN CALCIUM 20 MG/1
TABLET, FILM COATED ORAL
Qty: 30 TABLET | Refills: 10 | Status: CANCELLED | OUTPATIENT
Start: 2018-05-13

## 2018-05-14 ENCOUNTER — OFFICE VISIT (OUTPATIENT)
Dept: FAMILY MEDICINE CLINIC | Age: 58
End: 2018-05-14

## 2018-05-14 VITALS
WEIGHT: 151.38 LBS | BODY MASS INDEX: 27.69 KG/M2 | OXYGEN SATURATION: 98 % | RESPIRATION RATE: 12 BRPM | DIASTOLIC BLOOD PRESSURE: 70 MMHG | SYSTOLIC BLOOD PRESSURE: 112 MMHG | HEART RATE: 79 BPM

## 2018-05-14 DIAGNOSIS — C18.2 CANCER OF ASCENDING COLON (HCC): ICD-10-CM

## 2018-05-14 DIAGNOSIS — J30.9 ALLERGIC RHINITIS, UNSPECIFIED CHRONICITY, UNSPECIFIED SEASONALITY, UNSPECIFIED TRIGGER: ICD-10-CM

## 2018-05-14 DIAGNOSIS — R10.84 ABDOMINAL PAIN, ACUTE, GENERALIZED: ICD-10-CM

## 2018-05-14 DIAGNOSIS — E78.00 HYPERCHOLESTEREMIA: ICD-10-CM

## 2018-05-14 DIAGNOSIS — I10 ESSENTIAL HYPERTENSION: Primary | ICD-10-CM

## 2018-05-14 DIAGNOSIS — H81.09 MENIERE'S DISEASE, UNSPECIFIED LATERALITY: ICD-10-CM

## 2018-05-14 PROCEDURE — 99214 OFFICE O/P EST MOD 30 MIN: CPT | Performed by: FAMILY MEDICINE

## 2018-05-14 RX ORDER — ATORVASTATIN CALCIUM 20 MG/1
TABLET, FILM COATED ORAL
Qty: 30 TABLET | Refills: 11 | Status: SHIPPED | OUTPATIENT
Start: 2018-05-14 | End: 2019-05-15 | Stop reason: SDUPTHER

## 2018-05-14 ASSESSMENT — PATIENT HEALTH QUESTIONNAIRE - PHQ9
SUM OF ALL RESPONSES TO PHQ QUESTIONS 1-9: 0
SUM OF ALL RESPONSES TO PHQ9 QUESTIONS 1 & 2: 0
2. FEELING DOWN, DEPRESSED OR HOPELESS: 0
1. LITTLE INTEREST OR PLEASURE IN DOING THINGS: 0

## 2018-05-29 ENCOUNTER — TELEPHONE (OUTPATIENT)
Dept: FAMILY MEDICINE CLINIC | Age: 58
End: 2018-05-29

## 2018-05-29 DIAGNOSIS — L60.8 TOENAIL DEFORMITY: Primary | ICD-10-CM

## 2018-05-30 RX ORDER — FLUCONAZOLE 100 MG/1
100 TABLET ORAL DAILY
Qty: 7 TABLET | Refills: 0 | Status: SHIPPED | OUTPATIENT
Start: 2018-05-30 | End: 2018-06-06

## 2018-07-11 ENCOUNTER — OFFICE VISIT (OUTPATIENT)
Dept: DERMATOLOGY | Age: 58
End: 2018-07-11

## 2018-07-11 DIAGNOSIS — L57.0 AK (ACTINIC KERATOSIS): Primary | ICD-10-CM

## 2018-07-11 DIAGNOSIS — Z85.828 HISTORY OF BASAL CELL CANCER: ICD-10-CM

## 2018-07-11 DIAGNOSIS — D22.9 BENIGN NEVUS: ICD-10-CM

## 2018-07-11 DIAGNOSIS — L82.1 SEBORRHEIC KERATOSES: ICD-10-CM

## 2018-07-11 DIAGNOSIS — D86.9 SARCOID: ICD-10-CM

## 2018-07-11 PROCEDURE — 99214 OFFICE O/P EST MOD 30 MIN: CPT | Performed by: DERMATOLOGY

## 2018-07-11 PROCEDURE — 17000 DESTRUCT PREMALG LESION: CPT | Performed by: DERMATOLOGY

## 2018-07-11 PROCEDURE — 17003 DESTRUCT PREMALG LES 2-14: CPT | Performed by: DERMATOLOGY

## 2018-07-11 NOTE — PROGRESS NOTES
face, neck, chest, abdomen, back, bilateral upper extremities, bilateral lower extremities, ocular conjunctiva, external lips, and nails was performed. Examination normal unless stated below. Underwear area not examined. Scattered on the trunk and extremities are multiple well-defined round and oval symmetric smoothly-bordered uniformly brown macules and papules. Multiple hyperkeratotic stuck on papules and plaques over her torso. Gritty erythematous papules left forehead, left nose, right dorsal hand. Well-healed scars at sites of prior skin cancers no sign of recurrence      Assessment and Plan     1. AK (actinic keratosis) - 4, face, right hand lesion(s) treated w/ liquid nitrogen. Edu re: risk of blister formation, discomfort, scar, hypopigmentation. Discussed wound care. 2. Benign nevus - Benign acquired melanocytic nevi  -Recommend monthly self skin exams   -Educated regarding the ABCDEs of melanoma detection   -Call for any new/changing moles or concerning lesions  -Reviewed sun protective behavior -- sun avoidance during the peak hours of the day, sun-protective clothing (including hat and sunglasses), sunscreen use (water resistant, broad spectrum, SPF at least 30, need for reapplication every 2 to 3 hours), avoidance of tanning beds      3. Seborrheic keratoses -Monitor for change    4. Sarcoid ([address] -Monitor for recurrence    5. History of basal cell cancer - No evidence of recurrence. Discussed risk of subsequent skin cancers and increased risk of melanoma. Reviewed importance of monitoring skin for change and sun protection with hats and sunscreen, as well as sun avoidance.

## 2018-07-30 DIAGNOSIS — I10 ESSENTIAL HYPERTENSION: ICD-10-CM

## 2018-07-30 RX ORDER — FUROSEMIDE 40 MG/1
TABLET ORAL
Qty: 90 TABLET | Refills: 1 | Status: SHIPPED | OUTPATIENT
Start: 2018-07-30 | End: 2018-11-14 | Stop reason: SDUPTHER

## 2018-07-30 RX ORDER — POTASSIUM CHLORIDE 20 MEQ/1
TABLET, EXTENDED RELEASE ORAL
Qty: 90 TABLET | Refills: 1 | Status: SHIPPED | OUTPATIENT
Start: 2018-07-30 | End: 2018-11-14 | Stop reason: SDUPTHER

## 2018-07-30 RX ORDER — VALSARTAN 40 MG/1
TABLET ORAL
Qty: 90 TABLET | Refills: 1 | Status: SHIPPED | OUTPATIENT
Start: 2018-07-30 | End: 2018-09-05 | Stop reason: CLARIF

## 2018-07-31 ENCOUNTER — TELEPHONE (OUTPATIENT)
Dept: FAMILY MEDICINE CLINIC | Age: 58
End: 2018-07-31

## 2018-09-05 RX ORDER — IRBESARTAN 75 MG/1
75 TABLET ORAL NIGHTLY
Qty: 90 TABLET | Refills: 0 | Status: SHIPPED | OUTPATIENT
Start: 2018-09-05 | End: 2018-11-14

## 2018-09-07 ENCOUNTER — TELEPHONE (OUTPATIENT)
Dept: FAMILY MEDICINE CLINIC | Age: 58
End: 2018-09-07

## 2018-09-07 NOTE — TELEPHONE ENCOUNTER
irbesartan (AVAPRO) 75 MG tablet 90 tablet 0 9/5/2018     Sig - Route: Take 1 tablet by mouth nightly - Oral      Pt wants to know if she should be taking potasium w/the above med? Pharmacy told her to ask. She is also wondering if she should be taking the above med in the morning or at night. Please advise.

## 2018-09-20 ENCOUNTER — TELEPHONE (OUTPATIENT)
Dept: FAMILY MEDICINE CLINIC | Age: 58
End: 2018-09-20

## 2018-09-20 RX ORDER — OSELTAMIVIR PHOSPHATE 75 MG/1
75 CAPSULE ORAL DAILY
Qty: 10 CAPSULE | Refills: 0 | Status: SHIPPED | OUTPATIENT
Start: 2018-09-20 | End: 2018-09-30

## 2018-10-05 ENCOUNTER — HOSPITAL ENCOUNTER (OUTPATIENT)
Dept: GENERAL RADIOLOGY | Age: 58
Discharge: HOME | End: 2018-10-05
Payer: COMMERCIAL

## 2018-10-05 ENCOUNTER — HOSPITAL ENCOUNTER (OUTPATIENT)
Dept: CT IMAGING | Age: 58
Discharge: HOME | End: 2018-10-05
Payer: COMMERCIAL

## 2018-10-05 ENCOUNTER — HOSPITAL ENCOUNTER (OUTPATIENT)
Age: 58
Discharge: HOME | End: 2018-10-05
Payer: COMMERCIAL

## 2018-10-05 ENCOUNTER — TELEPHONE (OUTPATIENT)
Dept: FAMILY MEDICINE CLINIC | Age: 58
End: 2018-10-05

## 2018-10-05 DIAGNOSIS — C18.2 MALIGNANT TUMOR OF ASCENDING COLON (HCC): ICD-10-CM

## 2018-10-05 DIAGNOSIS — C18.2 ASCENDING COLON MALIGNANT NEOPLASM (HCC): ICD-10-CM

## 2018-10-05 PROCEDURE — 6360000004 HC RX CONTRAST MEDICATION: Performed by: INTERNAL MEDICINE

## 2018-10-05 PROCEDURE — 74177 CT ABD & PELVIS W/CONTRAST: CPT

## 2018-10-05 PROCEDURE — 71046 X-RAY EXAM CHEST 2 VIEWS: CPT

## 2018-10-05 RX ORDER — FLUCONAZOLE 100 MG/1
100 TABLET ORAL DAILY
Qty: 7 TABLET | Refills: 0 | Status: SHIPPED | OUTPATIENT
Start: 2018-10-05 | End: 2018-10-12

## 2018-10-05 RX ADMIN — IOPAMIDOL 75 ML: 755 INJECTION, SOLUTION INTRAVENOUS at 12:30

## 2018-10-05 RX ADMIN — IOHEXOL 50 ML: 240 INJECTION, SOLUTION INTRATHECAL; INTRAVASCULAR; INTRAVENOUS; ORAL at 12:30

## 2018-11-02 ENCOUNTER — TELEPHONE (OUTPATIENT)
Dept: FAMILY MEDICINE CLINIC | Age: 58
End: 2018-11-02

## 2018-11-05 ENCOUNTER — TELEPHONE (OUTPATIENT)
Dept: FAMILY MEDICINE CLINIC | Age: 58
End: 2018-11-05

## 2018-11-14 ENCOUNTER — OFFICE VISIT (OUTPATIENT)
Dept: FAMILY MEDICINE CLINIC | Age: 58
End: 2018-11-14
Payer: COMMERCIAL

## 2018-11-14 VITALS
HEART RATE: 86 BPM | OXYGEN SATURATION: 97 % | DIASTOLIC BLOOD PRESSURE: 70 MMHG | BODY MASS INDEX: 27.8 KG/M2 | RESPIRATION RATE: 12 BRPM | SYSTOLIC BLOOD PRESSURE: 122 MMHG | WEIGHT: 152 LBS

## 2018-11-14 DIAGNOSIS — E78.00 HYPERCHOLESTEREMIA: ICD-10-CM

## 2018-11-14 DIAGNOSIS — I10 ESSENTIAL HYPERTENSION: Primary | ICD-10-CM

## 2018-11-14 DIAGNOSIS — C18.2 CANCER OF ASCENDING COLON (HCC): ICD-10-CM

## 2018-11-14 PROCEDURE — 99214 OFFICE O/P EST MOD 30 MIN: CPT | Performed by: FAMILY MEDICINE

## 2018-11-14 RX ORDER — POTASSIUM CHLORIDE 20 MEQ/1
20 TABLET, EXTENDED RELEASE ORAL DAILY PRN
Qty: 90 TABLET | Refills: 1
Start: 2018-11-14 | End: 2019-07-12

## 2018-11-14 RX ORDER — TRIAMTERENE AND HYDROCHLOROTHIAZIDE 37.5; 25 MG/1; MG/1
1 CAPSULE ORAL EVERY MORNING
Qty: 30 CAPSULE | Refills: 3 | Status: SHIPPED | OUTPATIENT
Start: 2018-11-14 | End: 2019-03-13 | Stop reason: SDUPTHER

## 2018-11-14 RX ORDER — FUROSEMIDE 40 MG/1
40 TABLET ORAL DAILY PRN
Qty: 90 TABLET | Refills: 1
Start: 2018-11-14 | End: 2019-07-12

## 2018-12-10 ENCOUNTER — TELEPHONE (OUTPATIENT)
Dept: FAMILY MEDICINE CLINIC | Age: 58
End: 2018-12-10

## 2018-12-10 RX ORDER — CEPHALEXIN 500 MG/1
500 CAPSULE ORAL 3 TIMES DAILY
Qty: 21 CAPSULE | Refills: 0 | Status: SHIPPED | OUTPATIENT
Start: 2018-12-10 | End: 2019-02-12 | Stop reason: SDUPTHER

## 2018-12-27 ENCOUNTER — TELEPHONE (OUTPATIENT)
Dept: FAMILY MEDICINE CLINIC | Age: 58
End: 2018-12-27

## 2019-01-09 ENCOUNTER — NURSE ONLY (OUTPATIENT)
Dept: FAMILY MEDICINE CLINIC | Age: 59
End: 2019-01-09

## 2019-01-09 VITALS — DIASTOLIC BLOOD PRESSURE: 86 MMHG | HEART RATE: 78 BPM | SYSTOLIC BLOOD PRESSURE: 142 MMHG

## 2019-01-16 ENCOUNTER — OFFICE VISIT (OUTPATIENT)
Dept: DERMATOLOGY | Age: 59
End: 2019-01-16
Payer: COMMERCIAL

## 2019-01-16 DIAGNOSIS — L57.0 KERATOSIS, ACTINIC: Primary | ICD-10-CM

## 2019-01-16 DIAGNOSIS — L82.1 SEBORRHEIC KERATOSES: ICD-10-CM

## 2019-01-16 DIAGNOSIS — D22.9 BENIGN NEVUS: ICD-10-CM

## 2019-01-16 DIAGNOSIS — Z85.828 HISTORY OF BASAL CELL CANCER: ICD-10-CM

## 2019-01-16 PROCEDURE — 17000 DESTRUCT PREMALG LESION: CPT | Performed by: DERMATOLOGY

## 2019-01-16 PROCEDURE — 99213 OFFICE O/P EST LOW 20 MIN: CPT | Performed by: DERMATOLOGY

## 2019-02-12 ENCOUNTER — OFFICE VISIT (OUTPATIENT)
Dept: FAMILY MEDICINE CLINIC | Age: 59
End: 2019-02-12
Payer: COMMERCIAL

## 2019-02-12 VITALS
HEART RATE: 95 BPM | WEIGHT: 151.5 LBS | BODY MASS INDEX: 27.71 KG/M2 | DIASTOLIC BLOOD PRESSURE: 84 MMHG | RESPIRATION RATE: 16 BRPM | TEMPERATURE: 99.8 F | OXYGEN SATURATION: 98 % | SYSTOLIC BLOOD PRESSURE: 136 MMHG

## 2019-02-12 DIAGNOSIS — J01.90 ACUTE BACTERIAL SINUSITIS: Primary | ICD-10-CM

## 2019-02-12 DIAGNOSIS — B96.89 ACUTE BACTERIAL SINUSITIS: Primary | ICD-10-CM

## 2019-02-12 PROCEDURE — 99213 OFFICE O/P EST LOW 20 MIN: CPT | Performed by: FAMILY MEDICINE

## 2019-02-12 RX ORDER — CEPHALEXIN 500 MG/1
500 CAPSULE ORAL 3 TIMES DAILY
Qty: 21 CAPSULE | Refills: 0 | Status: SHIPPED | OUTPATIENT
Start: 2019-02-12 | End: 2019-02-19

## 2019-02-12 ASSESSMENT — ENCOUNTER SYMPTOMS
SORE THROAT: 1
COUGH: 1

## 2019-02-25 ENCOUNTER — OFFICE VISIT (OUTPATIENT)
Dept: ORTHOPEDIC SURGERY | Age: 59
End: 2019-02-25
Payer: COMMERCIAL

## 2019-02-25 VITALS — BODY MASS INDEX: 27.42 KG/M2 | WEIGHT: 149 LBS | HEIGHT: 62 IN

## 2019-02-25 DIAGNOSIS — M25.561 ACUTE PAIN OF RIGHT KNEE: Primary | ICD-10-CM

## 2019-02-25 PROCEDURE — 99204 OFFICE O/P NEW MOD 45 MIN: CPT | Performed by: ORTHOPAEDIC SURGERY

## 2019-02-26 ENCOUNTER — TELEPHONE (OUTPATIENT)
Dept: FAMILY MEDICINE CLINIC | Age: 59
End: 2019-02-26

## 2019-02-26 RX ORDER — DOXYCYCLINE HYCLATE 100 MG
100 TABLET ORAL 2 TIMES DAILY
Qty: 20 TABLET | Refills: 0 | Status: SHIPPED | OUTPATIENT
Start: 2019-02-26 | End: 2019-03-08

## 2019-03-04 ENCOUNTER — OFFICE VISIT (OUTPATIENT)
Dept: ORTHOPEDIC SURGERY | Age: 59
End: 2019-03-04
Payer: COMMERCIAL

## 2019-03-04 VITALS — BODY MASS INDEX: 27.43 KG/M2 | HEIGHT: 62 IN | WEIGHT: 149.03 LBS

## 2019-03-04 DIAGNOSIS — S83.271D COMPLEX TEAR OF LATERAL MENISCUS OF RIGHT KNEE AS CURRENT INJURY, SUBSEQUENT ENCOUNTER: ICD-10-CM

## 2019-03-04 DIAGNOSIS — S83.231D COMPLEX TEAR OF MEDIAL MENISCUS OF RIGHT KNEE AS CURRENT INJURY, SUBSEQUENT ENCOUNTER: Primary | ICD-10-CM

## 2019-03-04 DIAGNOSIS — M17.11 PRIMARY OSTEOARTHRITIS OF RIGHT KNEE: ICD-10-CM

## 2019-03-04 PROBLEM — S83.271A COMPLEX TEAR OF LATERAL MENISCUS OF RIGHT KNEE AS CURRENT INJURY: Status: ACTIVE | Noted: 2019-03-04

## 2019-03-04 PROBLEM — S83.231A COMPLEX TEAR OF MEDIAL MENISCUS OF RIGHT KNEE AS CURRENT INJURY: Status: ACTIVE | Noted: 2019-03-04

## 2019-03-04 PROCEDURE — 99214 OFFICE O/P EST MOD 30 MIN: CPT | Performed by: ORTHOPAEDIC SURGERY

## 2019-03-06 ENCOUNTER — HOSPITAL ENCOUNTER (OUTPATIENT)
Dept: CT IMAGING | Age: 59
Discharge: HOME | End: 2019-03-06
Payer: COMMERCIAL

## 2019-03-06 DIAGNOSIS — C18.2 MALIGNANT NEOPLASM OF ASCENDING COLON (HCC): ICD-10-CM

## 2019-03-06 PROCEDURE — 6360000004 HC RX CONTRAST MEDICATION: Performed by: INTERNAL MEDICINE

## 2019-03-06 PROCEDURE — 74177 CT ABD & PELVIS W/CONTRAST: CPT

## 2019-03-06 PROCEDURE — 71260 CT THORAX DX C+: CPT

## 2019-03-06 RX ADMIN — IOPAMIDOL 65 ML: 755 INJECTION, SOLUTION INTRAVENOUS at 08:07

## 2019-03-06 RX ADMIN — IOPAMIDOL 65 ML: 755 INJECTION, SOLUTION INTRAVENOUS at 08:41

## 2019-03-06 RX ADMIN — IOHEXOL 50 ML: 240 INJECTION, SOLUTION INTRATHECAL; INTRAVASCULAR; INTRAVENOUS; ORAL at 08:06

## 2019-03-07 DIAGNOSIS — S83.241D ACUTE MEDIAL MENISCUS TEAR OF RIGHT KNEE, SUBSEQUENT ENCOUNTER: Primary | ICD-10-CM

## 2019-03-07 RX ORDER — HYDROCODONE BITARTRATE AND ACETAMINOPHEN 5; 325 MG/1; MG/1
1 TABLET ORAL EVERY 6 HOURS PRN
Qty: 28 TABLET | Refills: 0 | Status: SHIPPED | OUTPATIENT
Start: 2019-03-15 | End: 2019-03-22

## 2019-03-07 RX ORDER — MELOXICAM 15 MG/1
15 TABLET ORAL DAILY
Qty: 90 TABLET | Refills: 3 | Status: SHIPPED | OUTPATIENT
Start: 2019-03-15 | End: 2019-07-12

## 2019-03-12 ENCOUNTER — TELEPHONE (OUTPATIENT)
Dept: ORTHOPEDIC SURGERY | Age: 59
End: 2019-03-12

## 2019-03-13 RX ORDER — TRIAMTERENE AND HYDROCHLOROTHIAZIDE 37.5; 25 MG/1; MG/1
CAPSULE ORAL
Qty: 30 CAPSULE | Refills: 2 | Status: SHIPPED | OUTPATIENT
Start: 2019-03-13 | End: 2019-06-15 | Stop reason: SDUPTHER

## 2019-03-15 ENCOUNTER — ANESTHESIA EVENT (OUTPATIENT)
Dept: OPERATING ROOM | Age: 59
End: 2019-03-15
Payer: COMMERCIAL

## 2019-03-15 ENCOUNTER — HOSPITAL ENCOUNTER (OUTPATIENT)
Age: 59
Setting detail: OUTPATIENT SURGERY
Discharge: HOME | End: 2019-03-15
Attending: ORTHOPAEDIC SURGERY | Admitting: ORTHOPAEDIC SURGERY
Payer: COMMERCIAL

## 2019-03-15 ENCOUNTER — ANESTHESIA (OUTPATIENT)
Dept: OPERATING ROOM | Age: 59
End: 2019-03-15
Payer: COMMERCIAL

## 2019-03-15 VITALS
WEIGHT: 170 LBS | BODY MASS INDEX: 30.12 KG/M2 | SYSTOLIC BLOOD PRESSURE: 137 MMHG | RESPIRATION RATE: 16 BRPM | OXYGEN SATURATION: 99 % | HEART RATE: 78 BPM | DIASTOLIC BLOOD PRESSURE: 73 MMHG | TEMPERATURE: 97.8 F | HEIGHT: 63 IN

## 2019-03-15 VITALS
SYSTOLIC BLOOD PRESSURE: 127 MMHG | DIASTOLIC BLOOD PRESSURE: 70 MMHG | RESPIRATION RATE: 1 BRPM | OXYGEN SATURATION: 96 %

## 2019-03-15 PROCEDURE — 7100000000 HC PACU RECOVERY - FIRST 15 MIN: Performed by: ORTHOPAEDIC SURGERY

## 2019-03-15 PROCEDURE — 2709999900 HC NON-CHARGEABLE SUPPLY: Performed by: ORTHOPAEDIC SURGERY

## 2019-03-15 PROCEDURE — 3600000004 HC SURGERY LEVEL 4 BASE: Performed by: ORTHOPAEDIC SURGERY

## 2019-03-15 PROCEDURE — 7100000001 HC PACU RECOVERY - ADDTL 15 MIN: Performed by: ORTHOPAEDIC SURGERY

## 2019-03-15 PROCEDURE — 7100000010 HC PHASE II RECOVERY - FIRST 15 MIN: Performed by: ORTHOPAEDIC SURGERY

## 2019-03-15 PROCEDURE — 2500000003 HC RX 250 WO HCPCS: Performed by: ORTHOPAEDIC SURGERY

## 2019-03-15 PROCEDURE — 2580000003 HC RX 258: Performed by: ORTHOPAEDIC SURGERY

## 2019-03-15 PROCEDURE — 6360000002 HC RX W HCPCS: Performed by: ORTHOPAEDIC SURGERY

## 2019-03-15 PROCEDURE — 2580000003 HC RX 258: Performed by: NURSE ANESTHETIST, CERTIFIED REGISTERED

## 2019-03-15 PROCEDURE — 6360000002 HC RX W HCPCS: Performed by: NURSE ANESTHETIST, CERTIFIED REGISTERED

## 2019-03-15 PROCEDURE — 2500000003 HC RX 250 WO HCPCS: Performed by: NURSE ANESTHETIST, CERTIFIED REGISTERED

## 2019-03-15 PROCEDURE — 7100000011 HC PHASE II RECOVERY - ADDTL 15 MIN: Performed by: ORTHOPAEDIC SURGERY

## 2019-03-15 PROCEDURE — 3700000000 HC ANESTHESIA ATTENDED CARE: Performed by: ORTHOPAEDIC SURGERY

## 2019-03-15 PROCEDURE — 3700000001 HC ADD 15 MINUTES (ANESTHESIA): Performed by: ORTHOPAEDIC SURGERY

## 2019-03-15 PROCEDURE — 6370000000 HC RX 637 (ALT 250 FOR IP): Performed by: FAMILY MEDICINE

## 2019-03-15 PROCEDURE — 2720000010 HC SURG SUPPLY STERILE: Performed by: ORTHOPAEDIC SURGERY

## 2019-03-15 PROCEDURE — 3600000014 HC SURGERY LEVEL 4 ADDTL 15MIN: Performed by: ORTHOPAEDIC SURGERY

## 2019-03-15 RX ORDER — LIDOCAINE HYDROCHLORIDE 20 MG/ML
INJECTION, SOLUTION INFILTRATION; PERINEURAL PRN
Status: DISCONTINUED | OUTPATIENT
Start: 2019-03-15 | End: 2019-03-15 | Stop reason: SDUPTHER

## 2019-03-15 RX ORDER — MEPERIDINE HYDROCHLORIDE 25 MG/ML
12.5 INJECTION INTRAMUSCULAR; INTRAVENOUS; SUBCUTANEOUS EVERY 5 MIN PRN
Status: DISCONTINUED | OUTPATIENT
Start: 2019-03-15 | End: 2019-03-15 | Stop reason: HOSPADM

## 2019-03-15 RX ORDER — PROPOFOL 10 MG/ML
INJECTION, EMULSION INTRAVENOUS CONTINUOUS PRN
Status: DISCONTINUED | OUTPATIENT
Start: 2019-03-15 | End: 2019-03-15 | Stop reason: SDUPTHER

## 2019-03-15 RX ORDER — CEFAZOLIN SODIUM 2 G/100ML
2 INJECTION, SOLUTION INTRAVENOUS
Status: COMPLETED | OUTPATIENT
Start: 2019-03-15 | End: 2019-03-15

## 2019-03-15 RX ORDER — MIDAZOLAM HYDROCHLORIDE 1 MG/ML
INJECTION INTRAMUSCULAR; INTRAVENOUS PRN
Status: DISCONTINUED | OUTPATIENT
Start: 2019-03-15 | End: 2019-03-15 | Stop reason: SDUPTHER

## 2019-03-15 RX ORDER — ONDANSETRON 2 MG/ML
4 INJECTION INTRAMUSCULAR; INTRAVENOUS EVERY 6 HOURS PRN
Status: DISCONTINUED | OUTPATIENT
Start: 2019-03-15 | End: 2019-03-15 | Stop reason: HOSPADM

## 2019-03-15 RX ORDER — DOCUSATE SODIUM 100 MG/1
100 CAPSULE, LIQUID FILLED ORAL 2 TIMES DAILY
Status: DISCONTINUED | OUTPATIENT
Start: 2019-03-15 | End: 2019-03-15 | Stop reason: HOSPADM

## 2019-03-15 RX ORDER — HYDROMORPHONE HCL 110MG/55ML
0.25 PATIENT CONTROLLED ANALGESIA SYRINGE INTRAVENOUS EVERY 5 MIN PRN
Status: DISCONTINUED | OUTPATIENT
Start: 2019-03-15 | End: 2019-03-15 | Stop reason: HOSPADM

## 2019-03-15 RX ORDER — SODIUM CHLORIDE 9 MG/ML
INJECTION, SOLUTION INTRAVENOUS CONTINUOUS
Status: DISCONTINUED | OUTPATIENT
Start: 2019-03-15 | End: 2019-03-15 | Stop reason: HOSPADM

## 2019-03-15 RX ORDER — OXYCODONE HYDROCHLORIDE 5 MG/1
5 TABLET ORAL PRN
Status: COMPLETED | OUTPATIENT
Start: 2019-03-15 | End: 2019-03-15

## 2019-03-15 RX ORDER — FENTANYL CITRATE 50 UG/ML
INJECTION, SOLUTION INTRAMUSCULAR; INTRAVENOUS PRN
Status: DISCONTINUED | OUTPATIENT
Start: 2019-03-15 | End: 2019-03-15 | Stop reason: SDUPTHER

## 2019-03-15 RX ORDER — SODIUM CHLORIDE 0.9 % (FLUSH) 0.9 %
10 SYRINGE (ML) INJECTION PRN
Status: DISCONTINUED | OUTPATIENT
Start: 2019-03-15 | End: 2019-03-15 | Stop reason: HOSPADM

## 2019-03-15 RX ORDER — LABETALOL HYDROCHLORIDE 5 MG/ML
5 INJECTION, SOLUTION INTRAVENOUS EVERY 10 MIN PRN
Status: DISCONTINUED | OUTPATIENT
Start: 2019-03-15 | End: 2019-03-15 | Stop reason: HOSPADM

## 2019-03-15 RX ORDER — SODIUM CHLORIDE 0.9 % (FLUSH) 0.9 %
10 SYRINGE (ML) INJECTION EVERY 12 HOURS SCHEDULED
Status: DISCONTINUED | OUTPATIENT
Start: 2019-03-15 | End: 2019-03-15 | Stop reason: HOSPADM

## 2019-03-15 RX ORDER — PROMETHAZINE HYDROCHLORIDE 25 MG/ML
6.25 INJECTION, SOLUTION INTRAMUSCULAR; INTRAVENOUS PRN
Status: DISCONTINUED | OUTPATIENT
Start: 2019-03-15 | End: 2019-03-15 | Stop reason: HOSPADM

## 2019-03-15 RX ORDER — DIPHENHYDRAMINE HYDROCHLORIDE 50 MG/ML
12.5 INJECTION INTRAMUSCULAR; INTRAVENOUS
Status: DISCONTINUED | OUTPATIENT
Start: 2019-03-15 | End: 2019-03-15 | Stop reason: HOSPADM

## 2019-03-15 RX ORDER — HYDROMORPHONE HCL 110MG/55ML
0.5 PATIENT CONTROLLED ANALGESIA SYRINGE INTRAVENOUS EVERY 5 MIN PRN
Status: DISCONTINUED | OUTPATIENT
Start: 2019-03-15 | End: 2019-03-15 | Stop reason: HOSPADM

## 2019-03-15 RX ORDER — FENTANYL CITRATE 50 UG/ML
50 INJECTION, SOLUTION INTRAMUSCULAR; INTRAVENOUS EVERY 5 MIN PRN
Status: DISCONTINUED | OUTPATIENT
Start: 2019-03-15 | End: 2019-03-15 | Stop reason: HOSPADM

## 2019-03-15 RX ORDER — SODIUM CHLORIDE 9 MG/ML
INJECTION, SOLUTION INTRAVENOUS CONTINUOUS PRN
Status: DISCONTINUED | OUTPATIENT
Start: 2019-03-15 | End: 2019-03-15 | Stop reason: SDUPTHER

## 2019-03-15 RX ORDER — OXYCODONE HYDROCHLORIDE 5 MG/1
10 TABLET ORAL PRN
Status: COMPLETED | OUTPATIENT
Start: 2019-03-15 | End: 2019-03-15

## 2019-03-15 RX ADMIN — MIDAZOLAM HYDROCHLORIDE 2 MG: 1 INJECTION, SOLUTION INTRAMUSCULAR; INTRAVENOUS at 09:24

## 2019-03-15 RX ADMIN — PROPOFOL 70 MCG/KG/MIN: 10 INJECTION, EMULSION INTRAVENOUS at 09:30

## 2019-03-15 RX ADMIN — LIDOCAINE HYDROCHLORIDE 100 MG: 20 INJECTION, SOLUTION INFILTRATION; PERINEURAL at 09:28

## 2019-03-15 RX ADMIN — OXYCODONE HYDROCHLORIDE 5 MG: 5 TABLET ORAL at 10:29

## 2019-03-15 RX ADMIN — FENTANYL CITRATE 50 MCG: 50 INJECTION, SOLUTION INTRAMUSCULAR; INTRAVENOUS at 09:29

## 2019-03-15 RX ADMIN — CEFAZOLIN SODIUM 2 G: 2 INJECTION, SOLUTION INTRAVENOUS at 09:25

## 2019-03-15 RX ADMIN — SODIUM CHLORIDE: 9 INJECTION, SOLUTION INTRAVENOUS at 09:28

## 2019-03-15 ASSESSMENT — PULMONARY FUNCTION TESTS
PIF_VALUE: 0
PIF_VALUE: 1
PIF_VALUE: 0
PIF_VALUE: 1
PIF_VALUE: 0

## 2019-03-15 ASSESSMENT — PAIN SCALES - GENERAL
PAINLEVEL_OUTOF10: 3
PAINLEVEL_OUTOF10: 2
PAINLEVEL_OUTOF10: 2
PAINLEVEL_OUTOF10: 3
PAINLEVEL_OUTOF10: 6
PAINLEVEL_OUTOF10: 4
PAINLEVEL_OUTOF10: 6

## 2019-03-15 ASSESSMENT — PAIN - FUNCTIONAL ASSESSMENT
PAIN_FUNCTIONAL_ASSESSMENT: 0-10
PAIN_FUNCTIONAL_ASSESSMENT: PREVENTS OR INTERFERES SOME ACTIVE ACTIVITIES AND ADLS

## 2019-03-15 ASSESSMENT — PAIN DESCRIPTION - LOCATION: LOCATION: KNEE

## 2019-03-15 ASSESSMENT — PAIN DESCRIPTION - DESCRIPTORS
DESCRIPTORS: DULL
DESCRIPTORS: ACHING

## 2019-03-15 ASSESSMENT — PAIN DESCRIPTION - PAIN TYPE: TYPE: SURGICAL PAIN

## 2019-03-15 ASSESSMENT — PAIN DESCRIPTION - ORIENTATION: ORIENTATION: RIGHT

## 2019-03-15 ASSESSMENT — PAIN DESCRIPTION - FREQUENCY: FREQUENCY: CONTINUOUS

## 2019-03-19 DIAGNOSIS — S83.271D COMPLEX TEAR OF LATERAL MENISCUS OF RIGHT KNEE AS CURRENT INJURY, SUBSEQUENT ENCOUNTER: ICD-10-CM

## 2019-03-19 DIAGNOSIS — S83.231D COMPLEX TEAR OF MEDIAL MENISCUS OF RIGHT KNEE AS CURRENT INJURY, SUBSEQUENT ENCOUNTER: Primary | ICD-10-CM

## 2019-03-20 ENCOUNTER — HOSPITAL ENCOUNTER (OUTPATIENT)
Dept: WOMENS IMAGING | Age: 59
Discharge: HOME | End: 2019-03-20
Payer: COMMERCIAL

## 2019-03-20 DIAGNOSIS — Z12.39 BREAST CANCER SCREENING: ICD-10-CM

## 2019-03-20 PROCEDURE — 77063 BREAST TOMOSYNTHESIS BI: CPT

## 2019-03-21 ENCOUNTER — OFFICE VISIT (OUTPATIENT)
Dept: ORTHOPEDIC SURGERY | Age: 59
End: 2019-03-21

## 2019-03-21 DIAGNOSIS — M17.11 PRIMARY OSTEOARTHRITIS OF RIGHT KNEE: ICD-10-CM

## 2019-03-21 DIAGNOSIS — S83.271D COMPLEX TEAR OF LATERAL MENISCUS OF RIGHT KNEE AS CURRENT INJURY, SUBSEQUENT ENCOUNTER: ICD-10-CM

## 2019-03-21 DIAGNOSIS — S83.231D COMPLEX TEAR OF MEDIAL MENISCUS OF RIGHT KNEE AS CURRENT INJURY, SUBSEQUENT ENCOUNTER: Primary | ICD-10-CM

## 2019-03-21 PROCEDURE — 99024 POSTOP FOLLOW-UP VISIT: CPT | Performed by: ORTHOPAEDIC SURGERY

## 2019-03-22 ENCOUNTER — HOSPITAL ENCOUNTER (OUTPATIENT)
Dept: PHYSICAL THERAPY | Age: 59
Setting detail: THERAPIES SERIES
Discharge: HOME | End: 2019-03-22
Payer: COMMERCIAL

## 2019-03-22 PROCEDURE — 97016 VASOPNEUMATIC DEVICE THERAPY: CPT

## 2019-03-22 PROCEDURE — 97110 THERAPEUTIC EXERCISES: CPT

## 2019-03-22 PROCEDURE — 97161 PT EVAL LOW COMPLEX 20 MIN: CPT

## 2019-03-25 ENCOUNTER — HOSPITAL ENCOUNTER (OUTPATIENT)
Dept: PHYSICAL THERAPY | Age: 59
Setting detail: THERAPIES SERIES
Discharge: HOME | End: 2019-03-25
Payer: COMMERCIAL

## 2019-03-25 PROCEDURE — 97140 MANUAL THERAPY 1/> REGIONS: CPT

## 2019-03-25 PROCEDURE — 97016 VASOPNEUMATIC DEVICE THERAPY: CPT

## 2019-03-25 PROCEDURE — 97110 THERAPEUTIC EXERCISES: CPT

## 2019-03-29 ENCOUNTER — APPOINTMENT (OUTPATIENT)
Dept: PHYSICAL THERAPY | Age: 59
End: 2019-03-29
Payer: COMMERCIAL

## 2019-04-01 ENCOUNTER — HOSPITAL ENCOUNTER (OUTPATIENT)
Dept: PHYSICAL THERAPY | Age: 59
Setting detail: THERAPIES SERIES
Discharge: HOME | End: 2019-04-01
Payer: COMMERCIAL

## 2019-04-01 PROCEDURE — 97016 VASOPNEUMATIC DEVICE THERAPY: CPT | Performed by: PHYSICAL THERAPY ASSISTANT

## 2019-04-01 PROCEDURE — 97110 THERAPEUTIC EXERCISES: CPT | Performed by: PHYSICAL THERAPY ASSISTANT

## 2019-04-01 PROCEDURE — 97140 MANUAL THERAPY 1/> REGIONS: CPT | Performed by: PHYSICAL THERAPY ASSISTANT

## 2019-04-01 NOTE — FLOWSHEET NOTE
[facility]    Physical Therapy Daily Treatment Note  Date:  2019    Patient Name:  Tay Hutson    :  1960  MRN: [id number]  Restrictions/Precautions:    Medical/Treatment Diagnosis Information:  · Diagnosis: s/p right knee scope 3/15/19; S83.231D, S83.271D  · Treatment Diagnosis: R knee pain  Insurance/Certification information:  PT Insurance Information: [employer], $4000 deductible, no copay, 20 OP visits  Physician Information:  Referring Practitioner: Samir Braun DO  Plan of care signed (Y/N):     Date of Patient follow up with Physician:     G-Code (if applicable):      Date G-Code Applied:         Progress Note: [x]  Yes  []  No  Next due by: Visit #10       Latex Allergy:  [x]NO      []YES  Preferred Language for Healthcare:   [x]English       []other:    Visit # Insurance Allowable   3 20     Pain level: 1/10     SUBJECTIVE: Pt states she was performing her HEP last week and heard a large pop and initiially has increased pain and soreness but has resolved back to her baseline level of pain  OBJECTIVE:   Observation:   Test measurements:  19: PROM 0- 126    RESTRICTIONS/PRECAUTIONS: h/o CA - having some acute central LBP since Sx    Exercises/Interventions:     Therapeutic Ex 30' Resistance Sets/sec Reps Notes   Retro Stepper/BIKE  6 min     SKTC  30\" 2 For LBP   Fig four  30\" 2 For LBP          Quad sets  10\" 10    SAQ 2# 3 10    SLR  3 10    Heel slides with strap  10\" 10    bridging  3 10    SL clams orange 2 10    EOB hamstring stretch  30\" 3    Slant board gastroc/soleus  1 min ea    TKE 60# 3 10                                Manual Intervention 7'       Knee mobs/PROM 5 min      Tib/Fem Mobs 2 min      Patella Mobs       Ankle mobs       IA STM        NMR re-education 5'       Tandem stance  1 min ea    ladders  3'  sidestepping                                                        Therapeutic Exercise and NMR EXR  [] (46720) Provided verbal/tactile cueing for activities related to strengthening, flexibility, endurance, ROM for improvements in LE, proximal hip, and core control with self care, mobility, lifting, ambulation.  [] (35059) Provided verbal/tactile cueing for activities related to improving balance, coordination, kinesthetic sense, posture, motor skill, proprioception  to assist with LE, proximal hip, and core control in self care, mobility, lifting, ambulation and eccentric single leg control.      NMR and Therapeutic Activities:    [] (94463 or 26526) Provided verbal/tactile cueing for activities related to improving balance, coordination, kinesthetic sense, posture, motor skill, proprioception and motor activation to allow for proper function of core, proximal hip and LE with self care and ADLs  [] (22849) Gait Re-education- Provided training and instruction to the patient for proper LE, core and proximal hip recruitment and positioning and eccentric body weight control with ambulation re-education including up and down stairs     Home Exercise Program:    [x] (33447) Reviewed/Progressed HEP activities related to strengthening, flexibility, endurance, ROM of core, proximal hip and LE for functional self-care, mobility, lifting and ambulation/stair navigation   [] (89273)Reviewed/Progressed HEP activities related to improving balance, coordination, kinesthetic sense, posture, motor skill, proprioception of core, proximal hip and LE for self care, mobility, lifting, and ambulation/stair navigation      Manual Treatments:  PROM / STM / Oscillations-Mobs:  G-I, II, III, IV (PA's, Inf., Post.)  [] (20204) Provided manual therapy to mobilize LE, proximal hip and/or LS spine soft tissue/joints for the purpose of modulating pain, promoting relaxation,  increasing ROM, reducing/eliminating soft tissue swelling/inflammation/restriction, improving soft tissue extensibility and allowing for proper ROM for normal function with self care, mobility, lifting and ambulation. Modalities:  Game ready for swelling x 15 min    Charges:  Timed Code Treatment Minutes: 42   Total Treatment Minutes: 57     [] EVAL  [x] DW(78446) x  2   [] IONTO  [] NMR (13863) x      [x] VASO  [x] Manual (77037) x  1    [] Other:  [] TA x       [] Mech Traction (51094)  [] ES(attended) (44963)      [] ES (un) (24039):     GOALS:   Patient stated goal: return to walking program     Therapist goals for Patient:   Short Term Goals: To be achieved in: 2 weeks  1. Independent in HEP and progression per patient tolerance, in order to prevent re-injury. 2. Patient will have a decrease in pain to facilitate improvement in movement, function, and ADLs as indicated by Functional Deficits.     Long Term Goals: To be achieved in: 6-8 weeks  1. Disability index score of 16% or less for the LEFS to assist with reaching prior level of function. 2. Patient will demonstrate increased AROM to [facility] to allow for proper joint functioning as indicated by patients Functional Deficits. 3. Patient will demonstrate an increase in Strength to good proximal hip strength and control, within 5lb HHD in LE to allow for proper functional mobility as indicated by patients Functional Deficits. 4. Patient will return to walking x 45 min without increased symptoms or restriction. 5. Pt will negotiate one flight of stairs with reciprocal gait pattern without pain                Progression Towards Functional goals:  [] Patient is progressing as expected towards functional goals listed. [] Progression is slowed due to complexities listed. [] Progression has been slowed due to co-morbidities. [x] Plan just implemented, too soon to assess goals progression  [] Other:     ASSESSMENT:   Good tolerance for exercise progressions.   Antalgic gait decreased with cueing to increase stance phase on R LE    Treatment/Activity Tolerance:  [x] Patient tolerated treatment well [] Patient limited lazaro chisholm  [] Patient limited by pain  [] Patient limited by other medical complications  [] Other:     Prognosis: [x] Good [] Fair  [] Poor    Patient Requires Follow-up: [x] Yes  [] No    PLAN: Progress patients ROM , strength and function as tolerated by patient  [x] Continue per plan of care [] Alter current plan (see comments)  [] Plan of care initiated [] Hold pending MD visit [] Discharge    Electronically signed by: Romero Lynn PTA

## 2019-04-04 ENCOUNTER — HOSPITAL ENCOUNTER (OUTPATIENT)
Dept: PHYSICAL THERAPY | Age: 59
Setting detail: THERAPIES SERIES
Discharge: HOME | End: 2019-04-04
Payer: COMMERCIAL

## 2019-04-04 PROCEDURE — 97140 MANUAL THERAPY 1/> REGIONS: CPT

## 2019-04-04 PROCEDURE — 97110 THERAPEUTIC EXERCISES: CPT

## 2019-04-04 NOTE — FLOWSHEET NOTE
[facility]    Physical Therapy Daily Treatment Note  Date:  2019    Patient Name:  Lili Worthy    :  1960  MRN: [id number]  Restrictions/Precautions:    Medical/Treatment Diagnosis Information:  · Diagnosis: s/p right knee scope 3/15/19; S83.231D, S83.271D  · Treatment Diagnosis: R knee pain  Insurance/Certification information:  PT Insurance Information: [place] [employer]et, $4000 deductible, no copay, 20 OP visits  Physician Information:  Referring Practitioner: Cuong Lazar DO  Plan of care signed (Y/N):     Date of Patient follow up with Physician:     G-Code (if applicable):      Date G-Code Applied:         Progress Note: [x]  Yes  []  No  Next due by: Visit #10       Latex Allergy:  [x]NO      []YES  Preferred Language for Healthcare:   [x]English       []other:    Visit # Insurance Allowable   4 20     Pain level: 3/10     SUBJECTIVE: increased soreness from [place] with her grandkids. Notes some increased swelling and pain when performing heel slides with sheet pull.      OBJECTIVE:   Observation:   Test measurements:  19: PROM 0- 126    RESTRICTIONS/PRECAUTIONS: h/o CA - having some acute central LBP since Sx    Exercises/Interventions:     Therapeutic Ex 30' Resistance Sets/sec Reps Notes   Retro Stepper/BIKE  6 min      30\" 2 For LBP    30\" 2 For LBP          Quad sets  10\" 10    SAQ 2# 3 10    SLR  3 10    Heel slides  SB 5\" 20    bridging  3 10    SL clams orange 2 10 Teal npv   EOB hamstring stretch  30\" 3    Slant board gastroc/soleus  1 min ea    TKE 60# 3 10    BLE leg press 70# 3 10    TB sidestepping orange 3 laps                  Manual Intervention 12'       Knee mobs/PROM 5 min      Tib/Fem Mobs 2 min      Patella Mobs       Ankle mobs       IA STM 5 min   quad          NMR re-education 5'       Tandem stance  1 min ea      3'  sidestepping Therapeutic Exercise and NMR EXR  [] (26979) Provided verbal/tactile cueing for activities related to strengthening, flexibility, endurance, ROM for improvements in LE, proximal hip, and core control with self care, mobility, lifting, ambulation.  [] (19968) Provided verbal/tactile cueing for activities related to improving balance, coordination, kinesthetic sense, posture, motor skill, proprioception  to assist with LE, proximal hip, and core control in self care, mobility, lifting, ambulation and eccentric single leg control.      NMR and Therapeutic Activities:    [] (90600 or 62404) Provided verbal/tactile cueing for activities related to improving balance, coordination, kinesthetic sense, posture, motor skill, proprioception and motor activation to allow for proper function of core, proximal hip and LE with self care and ADLs  [] (19048) Gait Re-education- Provided training and instruction to the patient for proper LE, core and proximal hip recruitment and positioning and eccentric body weight control with ambulation re-education including up and down stairs     Home Exercise Program:    [x] (89879) Reviewed/Progressed HEP activities related to strengthening, flexibility, endurance, ROM of core, proximal hip and LE for functional self-care, mobility, lifting and ambulation/stair navigation   [] (56150)Reviewed/Progressed HEP activities related to improving balance, coordination, kinesthetic sense, posture, motor skill, proprioception of core, proximal hip and LE for self care, mobility, lifting, and ambulation/stair navigation      Manual Treatments:  PROM / STM / Oscillations-Mobs:  G-I, II, III, IV (PA's, Inf., Post.)  [] (40379) Provided manual therapy to mobilize LE, proximal hip and/or LS spine soft tissue/joints for the purpose of modulating pain, promoting relaxation,  increasing ROM, reducing/eliminating soft tissue swelling/inflammation/restriction, improving soft tissue extensibility and allowing for proper ROM for normal function with self care, mobility, lifting and ambulation. Modalities:  Game ready for swelling x 15 min    Charges:  Timed Code Treatment Minutes: 60   Total Treatment Minutes: 75     [] EVAL  [x] DQ(78616) x  3   [] IONTO  [] NMR (73677) x      [] VASO  [x] Manual (25886) x  1    [] Other:  [] TA x       [] Mech Traction (16455)  [] ES(attended) (39170)      [] ES (un) (67294):     GOALS:   Patient stated goal: return to walking program     Therapist goals for Patient:   Short Term Goals: To be achieved in: 2 weeks  1. Independent in HEP and progression per patient tolerance, in order to prevent re-injury. 2. Patient will have a decrease in pain to facilitate improvement in movement, function, and ADLs as indicated by Functional Deficits.     Long Term Goals: To be achieved in: 6-8 weeks  1. Disability index score of 16% or less for the LEFS to assist with reaching prior level of function. 2. Patient will demonstrate increased AROM to [facility] to allow for proper joint functioning as indicated by patients Functional Deficits. 3. Patient will demonstrate an increase in Strength to good proximal hip strength and control, within 5lb HHD in LE to allow for proper functional mobility as indicated by patients Functional Deficits. 4. Patient will return to walking x 45 min without increased symptoms or restriction. 5. Pt will negotiate one flight of stairs with reciprocal gait pattern without pain                Progression Towards Functional goals:  [x] Patient is progressing as expected towards functional goals listed. [] Progression is slowed due to complexities listed. [] Progression has been slowed due to co-morbidities. [] Plan just implemented, too soon to assess goals progression  [] Other:     ASSESSMENT:   Pt advised to decrease intensity of stretch with heel slides to avoid aggravation of the tissue and increased swelling. Good tolerance for progression of exercises.   Pt continues to demo quad weakness and decreased knee ext in midstance. Springy end feel into extension, but difficulty achieving full extension actively. Treatment/Activity Tolerance:  [x] Patient tolerated treatment well [] Patient limited by fatique  [] Patient limited by pain  [] Patient limited by other medical complications  [] Other:     Prognosis: [x] Good [] Fair  [] Poor    Patient Requires Follow-up: [x] Yes  [] No    PLAN: Purchase neoprene sleeve to wear during long excursions with grandkids. Progress patients ROM , strength and function as tolerated by patient  [x] Continue per plan of care [] Alter current plan (see comments)  [] Plan of care initiated [] Hold pending MD visit [] Discharge    Electronically signed by:  Toya Al PT

## 2019-04-08 ENCOUNTER — HOSPITAL ENCOUNTER (OUTPATIENT)
Dept: PHYSICAL THERAPY | Age: 59
Setting detail: THERAPIES SERIES
Discharge: HOME | End: 2019-04-08
Payer: COMMERCIAL

## 2019-04-08 PROCEDURE — 97110 THERAPEUTIC EXERCISES: CPT

## 2019-04-08 PROCEDURE — 97140 MANUAL THERAPY 1/> REGIONS: CPT

## 2019-04-11 ENCOUNTER — OFFICE VISIT (OUTPATIENT)
Dept: SURGERY | Age: 59
End: 2019-04-11
Payer: COMMERCIAL

## 2019-04-11 VITALS — DIASTOLIC BLOOD PRESSURE: 76 MMHG | BODY MASS INDEX: 26.39 KG/M2 | SYSTOLIC BLOOD PRESSURE: 125 MMHG | WEIGHT: 149 LBS

## 2019-04-11 DIAGNOSIS — C18.2 CANCER OF ASCENDING COLON (HCC): Primary | ICD-10-CM

## 2019-04-11 PROCEDURE — 99214 OFFICE O/P EST MOD 30 MIN: CPT | Performed by: SURGERY

## 2019-04-11 NOTE — PROGRESS NOTES
[facility] GENERAL AND LAPAROSCOPIC SURGERY          PATIENT NAME: Angie Sawyer     TODAY'S DATE: 4/11/2019    SUBJECTIVE:  CC Colon cancer  Pt here for colon cancer follow up. No new concerns, No N/V/D. No bleeding. Stable weight. No CP or SOB, No leg swelling, had recent knee surgery. Does have occasional dysphagia. EGD and colonoscopy done last March 2018     OBJECTIVE:    CONSTITUTIONAL:  awake and alert  LUNGS:  clear to auscultation  HEART: RRR  LYMPH: neck and groin negative  ABDOMEN:  normal bowel sounds, soft, non-distended, non-tender, no mass, incisions healed, no hernia     Data:      Radiology Review:   EXAMINATION:   CT OF THE ABDOMEN AND PELVIS WITH CONTRAST; CT OF THE CHEST WITH CONTRAST   3/6/2019 8:07 am; 3/6/2019 8:41 am       TECHNIQUE:   CT of the abdomen and pelvis was performed with the administration of   intravenous contrast. Multiplanar reformatted images are provided for review. Dose modulation, iterative reconstruction, and/or weight based adjustment of   the mA/kV was utilized to reduce the radiation dose to as low as reasonably   achievable.; CT of the chest was performed with the administration of   intravenous contrast. Multiplanar reformatted images are provided for review. Dose modulation, iterative reconstruction, and/or weight based adjustment of   the mA/kV was utilized to reduce the radiation dose to as low as reasonably   achievable. COMPARISON:   February 2018. October 2018, March 2017       HISTORY:   ORDERING SYSTEM PROVIDED HISTORY: Malignant neoplasm of ascending colon [facility])   TECHNOLOGIST PROVIDED HISTORY:   Additional Contrast?->Oral   Ordering Physician Provided Reason for Exam: colon cancer   Acuity: Unknown   Type of Exam: Unknown; ORDERING SYSTEM PROVIDED HISTORY: Malignant neoplasm   of ascending colon ([address]       FINDINGS:       Chest:       Mediastinum: Thyroid gland appears normal.  No intimal flap in aorta.   No   central pulmonary embolus. Scattered small mediastinal and hilar nodes are   noted. Small hiatal hernia seen. There is nonspecific thickening at the GE   junction       Lungs/pleura: No obstructing endobronchial lesions are seen. No pneumonia. No edema. No pleural effusion. 2 tiny punctate nodules left upper lobe appear unchanged measuring 3 mm in   size or less       Soft Tissues/Bones: Spurring is seen in the spine           Abdomen/Pelvis:       Organs: No perisplenic fluid       There is mild thickening of the adrenal glands bilaterally likely due to   adenoma or hyperplasia       Scattered hypodense nodules are seen throughout the left kidney, measuring 6   mm in size or less, likely cyst.  No hydronephrosis. Scattered hypodense nodules are seen throughout the right kidney measuring 8   mm in size or less, likely cysts       No intrahepatic ductal dilatation. No perihepatic fluid. There are clips   from prior cholecystectomy       Sharply circumscribed hypodense nodule are seen in the left hepatic lobe   measuring 5 mm in size or less, unchanged from prior. No peripancreatic inflammatory change. Tiny hypodense nodule in the   pancreatic body is unchanged, measuring 2 mm in size, similar to prior       GI/Bowel: Moderate to large stool load seen in the colon. A few colonic   diverticula are seen. No bowel obstruction. Postsurgical changes seen on   the right. Pelvis: No free fluid in pelvis. No pelvic adenopathy. Peritoneum/Retroperitoneum: Atherosclerotic change seen in aorta. No   developing adenopathy. Bones/Soft Tissues: Degenerative changes are seen in the spine. Degenerative   changes are seen in the hips.        Tiny sclerotic focus right acetabulum appears similar, likely bone island           Impression   Stable chest CT       Stable abdomen pelvis CT             PATHOLOGY 3/2016  Department of Pathology  FINAL SURGICAL PATHOLOGY REPORT  Patient Name: Lita La

## 2019-04-12 ENCOUNTER — HOSPITAL ENCOUNTER (OUTPATIENT)
Dept: PHYSICAL THERAPY | Age: 59
Setting detail: THERAPIES SERIES
Discharge: HOME | End: 2019-04-12
Payer: COMMERCIAL

## 2019-04-12 PROCEDURE — 97016 VASOPNEUMATIC DEVICE THERAPY: CPT

## 2019-04-12 PROCEDURE — 97140 MANUAL THERAPY 1/> REGIONS: CPT

## 2019-04-12 PROCEDURE — 97110 THERAPEUTIC EXERCISES: CPT

## 2019-04-12 NOTE — FLOWSHEET NOTE
min    Charges:  Timed Code Treatment Minutes: 50   Total Treatment Minutes: 65     [] EVAL  [x] LM(26782) x  2   [] IONTO  [] NMR (48047) x      [x] VASO  [x] Manual (14576) x  1    [] Other:  [] TA x       [] Mech Traction (91840)  [] ES(attended) (53947)      [] ES (un) (94755):     GOALS:   Patient stated goal: return to walking program     Therapist goals for Patient:   Short Term Goals: To be achieved in: 2 weeks  1. Independent in HEP and progression per patient tolerance, in order to prevent re-injury. 2. Patient will have a decrease in pain to facilitate improvement in movement, function, and ADLs as indicated by Functional Deficits.     Long Term Goals: To be achieved in: 6-8 weeks  1. Disability index score of 16% or less for the LEFS to assist with reaching prior level of function. 2. Patient will demonstrate increased AROM to [facility] to allow for proper joint functioning as indicated by patients Functional Deficits. 3. Patient will demonstrate an increase in Strength to good proximal hip strength and control, within 5lb HHD in LE to allow for proper functional mobility as indicated by patients Functional Deficits. 4. Patient will return to walking x 45 min without increased symptoms or restriction. 5. Pt will negotiate one flight of stairs with reciprocal gait pattern without pain                Progression Towards Functional goals:  [x] Patient is progressing as expected towards functional goals listed. [] Progression is slowed due to complexities listed. [] Progression has been slowed due to co-morbidities. [] Plan just implemented, too soon to assess goals progression  [] Other:     ASSESSMENT:   Improved quad contraction today and able to progress with weight machines. Full flexion improving with less pain at end range.     Treatment/Activity Tolerance:  [x] Patient tolerated treatment well [] Patient limited by fatique  [] Patient limited by pain  [] Patient limited by other medical

## 2019-04-15 ENCOUNTER — PATIENT MESSAGE (OUTPATIENT)
Dept: OTHER | Facility: CLINIC | Age: 59
End: 2019-04-15

## 2019-04-15 ENCOUNTER — HOSPITAL ENCOUNTER (OUTPATIENT)
Dept: PHYSICAL THERAPY | Age: 59
Setting detail: THERAPIES SERIES
Discharge: HOME | End: 2019-04-15
Payer: COMMERCIAL

## 2019-04-15 PROCEDURE — 97110 THERAPEUTIC EXERCISES: CPT | Performed by: PHYSICAL THERAPY ASSISTANT

## 2019-04-15 PROCEDURE — 97016 VASOPNEUMATIC DEVICE THERAPY: CPT | Performed by: PHYSICAL THERAPY ASSISTANT

## 2019-04-15 PROCEDURE — 97140 MANUAL THERAPY 1/> REGIONS: CPT | Performed by: PHYSICAL THERAPY ASSISTANT

## 2019-04-15 NOTE — FLOWSHEET NOTE
[facility]    Physical Therapy Daily Treatment Note  Date:  4/15/2019    Patient Name:  Jenn Kapadia    :  1960  MRN: [id number]  Restrictions/Precautions:    Medical/Treatment Diagnosis Information:  · Diagnosis: s/p right knee scope 3/15/19; S83.231D, S83.271D  · Treatment Diagnosis: R knee pain  Insurance/Certification information:  PT Insurance Information: [place] [employer]et, $4000 deductible, no copay, 20 OP visits  Physician Information:  Referring Practitioner: Enrrique Mcqueen DO  Plan of care signed (Y/N):     Date of Patient follow up with Physician:     G-Code (if applicable):      Date G-Code Applied:         Progress Note: [x]  Yes  []  No  Next due by: Visit #10       Latex Allergy:  [x]NO      []YES  Preferred Language for Healthcare:   [x]English       []other:    Visit # Insurance Allowable   6 20     Pain level: 0/10     SUBJECTIVE:  Pt states increase in posterior knee pain after mowing grass over weekend    OBJECTIVE:  4+ weeks s/p  Observation:   Test measurements:  19: PROM 0- 136      RESTRICTIONS/PRECAUTIONS: h/o CA - having some acute central LBP since Sx    Exercises/Interventions:     Therapeutic Ex 30' Resistance Sets/sec Reps Notes   Retro Stepper/BIKE  6 min      30\" 2 For LBP    30\" 2 For LBP          Quad sets  10\" 10       SLR  3 10    Heel slides  SB 5\" 20    bridging  3 10    SL clams maroon 2 10    EOB hamstring stretch  30\" 3    Slant board gastroc/soleus  1 min ea    TKE   BLE leg press 70# 3 10    TB sidestepping orange 3 laps    HS curls  30# 3 10    MH hip abduction 30# 1 20           Manual Intervention 10'       Knee mobs/PROM 5 min      Tib/Fem Mobs 2 min      Patella Mobs 3 min      Ankle mobs       IA STM    quad          NMR re-education 5'       Tandem stance  1 min ea    SL stance  30 3    4\" step up  2 10                                                  Therapeutic Exercise and NMR EXR  [x] (11735) Provided verbal/tactile cueing for activities related to strengthening, flexibility, endurance, ROM for improvements in LE, proximal hip, and core control with self care, mobility, lifting, ambulation.  [] (00394) Provided verbal/tactile cueing for activities related to improving balance, coordination, kinesthetic sense, posture, motor skill, proprioception  to assist with LE, proximal hip, and core control in self care, mobility, lifting, ambulation and eccentric single leg control.      NMR and Therapeutic Activities:    [] (95578 or 14513) Provided verbal/tactile cueing for activities related to improving balance, coordination, kinesthetic sense, posture, motor skill, proprioception and motor activation to allow for proper function of core, proximal hip and LE with self care and ADLs  [] (32405) Gait Re-education- Provided training and instruction to the patient for proper LE, core and proximal hip recruitment and positioning and eccentric body weight control with ambulation re-education including up and down stairs     Home Exercise Program:    [x] (26523) Reviewed/Progressed HEP activities related to strengthening, flexibility, endurance, ROM of core, proximal hip and LE for functional self-care, mobility, lifting and ambulation/stair navigation   [] (82719)Reviewed/Progressed HEP activities related to improving balance, coordination, kinesthetic sense, posture, motor skill, proprioception of core, proximal hip and LE for self care, mobility, lifting, and ambulation/stair navigation      Manual Treatments:  PROM / STM / Oscillations-Mobs:  G-I, II, III, IV (PA's, Inf., Post.)  [x] (13100) Provided manual therapy to mobilize LE, proximal hip and/or LS spine soft tissue/joints for the purpose of modulating pain, promoting relaxation,  increasing ROM, reducing/eliminating soft tissue swelling/inflammation/restriction, improving soft tissue extensibility and allowing for proper ROM for normal function with pain  [] Patient limited by other medical complications  [] Other:     Prognosis: [x] Good [] Fair  [] Poor    Patient Requires Follow-up: [x] Yes  [] No    PLAN: Progress patients ROM , strength and function as tolerated by patient  Progress patients ROM , strength and function as tolerated by patient  [x] Continue per plan of care [] Alter current plan (see comments)  [] Plan of care initiated [] Hold pending MD visit [] Discharge    Electronically signed by: Adrien Craig PTA

## 2019-04-17 ENCOUNTER — TELEPHONE (OUTPATIENT)
Dept: FAMILY MEDICINE CLINIC | Age: 59
End: 2019-04-17

## 2019-04-17 RX ORDER — OSELTAMIVIR PHOSPHATE 75 MG/1
75 CAPSULE ORAL 2 TIMES DAILY
Qty: 10 CAPSULE | Refills: 0 | Status: SHIPPED | OUTPATIENT
Start: 2019-04-17 | End: 2019-04-22

## 2019-04-17 NOTE — TELEPHONE ENCOUNTER
Patient started with a sore throat and headaches, and body aches since this morning. Her grandson has the flu.  Please advise

## 2019-04-19 ENCOUNTER — HOSPITAL ENCOUNTER (OUTPATIENT)
Dept: PHYSICAL THERAPY | Age: 59
Setting detail: THERAPIES SERIES
Discharge: HOME | End: 2019-04-19
Payer: COMMERCIAL

## 2019-04-19 PROCEDURE — 97140 MANUAL THERAPY 1/> REGIONS: CPT

## 2019-04-19 PROCEDURE — 97110 THERAPEUTIC EXERCISES: CPT

## 2019-04-19 NOTE — FLOWSHEET NOTE
[facility]    Physical Therapy Daily Treatment Note  Date:  2019    Patient Name:  Carmen Contreras    :  1960  MRN: [id number]  Restrictions/Precautions:    Medical/Treatment Diagnosis Information:  · Diagnosis: s/p right knee scope 3/15/19; S83.231D, S83.271D  · Treatment Diagnosis: R knee pain  Insurance/Certification information:  PT Insurance Information: [place] [employer]et, $4000 deductible, no copay, 20 OP visits  Physician Information:  Referring Practitioner: Edith Ward DO  Plan of care signed (Y/N):     Date of Patient follow up with Physician:     G-Code (if applicable):      Date G-Code Applied:         Progress Note: [x]  Yes  []  No  Next due by: Visit #10       Latex Allergy:  [x]NO      []YES  Preferred Language for Healthcare:   [x]English       []other:    Visit # Insurance Allowable   7 20     Pain level: 0/10     SUBJECTIVE:  Pt c/o discomfort with negotiating stairs.      OBJECTIVE:  6 weeks s/p  Observation:   Test measurements:  19: PROM 0- 136      RESTRICTIONS/PRECAUTIONS: h/o CA - having some acute central LBP since Sx    Exercises/Interventions:     Therapeutic Ex 40' Resistance Sets/sec Reps Notes   Retro Stepper/BIKE  6 min     Quad sets  10\" 10    SLR  3 10    bridging  3 10    SL clams maroon 2 10    EOB hamstring stretch  30\" 3    Slant board gastroc/soleus  1 min ea    TKE   BLE leg press 70# 3 10    TB sidestepping orange 3 laps    HS curls  30# 3 10    MH hip abduction 30# 1 20    Retro sliders  1 20           Manual Intervention 10'       Knee mobs/PROM 5 min      Tib/Fem Mobs 2 min      Patella Mobs 3 min      Ankle mobs       IA STM    quad          NMR re-education 5'       Tandem stance  1 min ea    SL stance  30 3    4\" step up  2 10                                                  Therapeutic Exercise and NMR EXR  [x] (64137) Provided verbal/tactile cueing for activities related to strengthening, flexibility, endurance, ROM for improvements in LE, proximal hip, and core control with self care, mobility, lifting, ambulation.  [] (13609) Provided verbal/tactile cueing for activities related to improving balance, coordination, kinesthetic sense, posture, motor skill, proprioception  to assist with LE, proximal hip, and core control in self care, mobility, lifting, ambulation and eccentric single leg control. NMR and Therapeutic Activities:    [x] (50757 or 27111) Provided verbal/tactile cueing for activities related to improving balance, coordination, kinesthetic sense, posture, motor skill, proprioception and motor activation to allow for proper function of core, proximal hip and LE with self care and ADLs  [] (40456) Gait Re-education- Provided training and instruction to the patient for proper LE, core and proximal hip recruitment and positioning and eccentric body weight control with ambulation re-education including up and down stairs     Home Exercise Program:    [x] (70696) Reviewed/Progressed HEP activities related to strengthening, flexibility, endurance, ROM of core, proximal hip and LE for functional self-care, mobility, lifting and ambulation/stair navigation   [] (07856)Reviewed/Progressed HEP activities related to improving balance, coordination, kinesthetic sense, posture, motor skill, proprioception of core, proximal hip and LE for self care, mobility, lifting, and ambulation/stair navigation      Manual Treatments:  PROM / STM / Oscillations-Mobs:  G-I, II, III, IV (PA's, Inf., Post.)  [x] (23991) Provided manual therapy to mobilize LE, proximal hip and/or LS spine soft tissue/joints for the purpose of modulating pain, promoting relaxation,  increasing ROM, reducing/eliminating soft tissue swelling/inflammation/restriction, improving soft tissue extensibility and allowing for proper ROM for normal function with self care, mobility, lifting and ambulation.      Modalities:  Game ready for swelling x 15 min    Charges:  Timed Code Treatment Minutes: 55   Total Treatment Minutes: 70     [] EVAL  [x] DO(77923) x  3   [] IONTO  [] NMR (24712) x      [] VASO  [x] Manual (93554) x  1    [] Other:  [] TA x       [] Mech Traction (99494)  [] ES(attended) (05474)      [] ES (un) (36983):     GOALS:   Patient stated goal: return to walking program     Therapist goals for Patient:   Short Term Goals: To be achieved in: 2 weeks  1. Independent in HEP and progression per patient tolerance, in order to prevent re-injury. 2. Patient will have a decrease in pain to facilitate improvement in movement, function, and ADLs as indicated by Functional Deficits.     Long Term Goals: To be achieved in: 6-8 weeks  1. Disability index score of 16% or less for the LEFS to assist with reaching prior level of function. 2. Patient will demonstrate increased AROM to [facility] to allow for proper joint functioning as indicated by patients Functional Deficits. 3. Patient will demonstrate an increase in Strength to good proximal hip strength and control, within 5lb HHD in LE to allow for proper functional mobility as indicated by patients Functional Deficits. 4. Patient will return to walking x 45 min without increased symptoms or restriction. 5. Pt will negotiate one flight of stairs with reciprocal gait pattern without pain            Progression Towards Functional goals:  [x] Patient is progressing as expected towards functional goals listed. [] Progression is slowed due to complexities listed. [] Progression has been slowed due to co-morbidities. [] Plan just implemented, too soon to assess goals progression  [] Other:     ASSESSMENT:   Improved pain with retro sliders with VC for weight bearing in the heel and decreased ROM. Still having some PF symptoms that are causing discomfort with stair ambulation.     Treatment/Activity Tolerance:  [x] Patient tolerated treatment well [] Patient limited by phan  [] Patient limited by pain  [] Patient limited by other medical complications  [] Other:     Prognosis: [x] Good [] Fair  [] Poor    Patient Requires Follow-up: [x] Yes  [] No    PLAN: Progress patients ROM , strength and function as tolerated by patient  Progress patients ROM , strength and function as tolerated by patient  [x] Continue per plan of care [] Alter current plan (see comments)  [] Plan of care initiated [] Hold pending MD visit [] Discharge    Electronically signed by:  Miguel Hedrick PT

## 2019-04-23 ENCOUNTER — APPOINTMENT (OUTPATIENT)
Dept: PHYSICAL THERAPY | Age: 59
End: 2019-04-23
Payer: COMMERCIAL

## 2019-04-25 ENCOUNTER — APPOINTMENT (OUTPATIENT)
Dept: PHYSICAL THERAPY | Age: 59
End: 2019-04-25
Payer: COMMERCIAL

## 2019-04-30 ENCOUNTER — HOSPITAL ENCOUNTER (OUTPATIENT)
Dept: PHYSICAL THERAPY | Age: 59
Setting detail: THERAPIES SERIES
Discharge: HOME | End: 2019-04-30
Payer: COMMERCIAL

## 2019-04-30 PROCEDURE — 97110 THERAPEUTIC EXERCISES: CPT

## 2019-04-30 PROCEDURE — 97016 VASOPNEUMATIC DEVICE THERAPY: CPT

## 2019-04-30 PROCEDURE — 97112 NEUROMUSCULAR REEDUCATION: CPT

## 2019-04-30 NOTE — FLOWSHEET NOTE
[facility]    Physical Therapy Daily Treatment Note  Date:  2019    Patient Name:  Siva Vásquez    :  1960  MRN: [id number]  Restrictions/Precautions:    Medical/Treatment Diagnosis Information:  · Diagnosis: s/p right knee scope 3/15/19; S83.231D, S83.271D  · Treatment Diagnosis: R knee pain  Insurance/Certification information:  PT Insurance Information: [place] [employer], $4000 deductible, no copay, 20 OP visits  Physician Information:  Referring Practitioner: Anita Lam DO  Plan of care signed (Y/N):     Date of Patient follow up with Physician:     G-Code (if applicable):      Date G-Code Applied:     LEFS; 8% disability    Progress Note: [x]  Yes  []  No  Next due by: Visit #10       Latex Allergy:  [x]NO      []YES  Preferred Language for Healthcare:   [x]English       []other:    Visit # Insurance Allowable   8 8 visits approved     Pain level: 0/10     SUBJECTIVE:  Pt has returned to her normal activities including mowing the lawn without significant issues. Still has some discomfort with negotiating stairs, but this improves when she is careful with her movement.     OBJECTIVE:  7+ weeks s/p  Observation:   Test measurements:  19: PROM 0- 136    R knee AROM: ext +2, flex 132   Right  Left   Knee EXT (quad) 29.1 24.0   Knee Flex (HS) prone  16.4 19.7         RESTRICTIONS/PRECAUTIONS: h/o CA - having some acute central LBP since Sx    Exercises/Interventions:     Therapeutic Ex 40' Resistance Sets/sec Reps Notes   Retro Stepper/BIKE  6 min        SLR  3 10    bridging  3 10    SL clams maroon 2 10    EOB hamstring stretch  30\" 3    Slant board gastroc/soleus  1 min ea    TKE   BLE leg press 70# 3 10    TB sidestepping orange 3 laps    HS curls  30# 3 10    MH hip abduction 30# 1 20    Retro sliders  1 20           Manual Intervention        Knee mobs/PROM      Tib/Fem Mobs      Patella Mobs      Ankle mobs       IA STM    quad NMR re-education 5'       Tandem stance  1 min ea    SL stance  30 3    4\" step up  2 10                                                  Therapeutic Exercise and NMR EXR  [x] (72778) Provided verbal/tactile cueing for activities related to strengthening, flexibility, endurance, ROM for improvements in LE, proximal hip, and core control with self care, mobility, lifting, ambulation.  [] (85644) Provided verbal/tactile cueing for activities related to improving balance, coordination, kinesthetic sense, posture, motor skill, proprioception  to assist with LE, proximal hip, and core control in self care, mobility, lifting, ambulation and eccentric single leg control.      NMR and Therapeutic Activities:    [x] (00654 or 09444) Provided verbal/tactile cueing for activities related to improving balance, coordination, kinesthetic sense, posture, motor skill, proprioception and motor activation to allow for proper function of core, proximal hip and LE with self care and ADLs  [] (39166) Gait Re-education- Provided training and instruction to the patient for proper LE, core and proximal hip recruitment and positioning and eccentric body weight control with ambulation re-education including up and down stairs     Home Exercise Program:    [x] (86138) Reviewed/Progressed HEP activities related to strengthening, flexibility, endurance, ROM of core, proximal hip and LE for functional self-care, mobility, lifting and ambulation/stair navigation   [] (40984)Reviewed/Progressed HEP activities related to improving balance, coordination, kinesthetic sense, posture, motor skill, proprioception of core, proximal hip and LE for self care, mobility, lifting, and ambulation/stair navigation      Manual Treatments:  PROM / STM / Oscillations-Mobs:  G-I, II, III, IV (PA's, Inf., Post.)  [x] (17165) Provided manual therapy to mobilize LE, proximal hip and/or LS spine soft tissue/joints for the purpose of modulating pain, promoting relaxation,  increasing ROM, reducing/eliminating soft tissue swelling/inflammation/restriction, improving soft tissue extensibility and allowing for proper ROM for normal function with self care, mobility, lifting and ambulation. Modalities:  Game ready for swelling x 15 min    Charges:  Timed Code Treatment Minutes: 50   Total Treatment Minutes: 65     [] EVAL  [x] YZ(42992) x  2   [] IONTO  [x] NMR (26761) x  1   [x] VASO  [] Manual (66347) x       [] Other:  [] TA x       [] Mech Traction (15004)  [] ES(attended) (81162)      [] ES (un) (84851):     GOALS:   Patient stated goal: return to walking program     Therapist goals for Patient:   Short Term Goals: To be achieved in: 2 weeks  1. Independent in HEP and progression per patient tolerance, in order to prevent re-injury. 2. Patient will have a decrease in pain to facilitate improvement in movement, function, and ADLs as indicated by Functional Deficits.     Long Term Goals: To be achieved in: 6-8 weeks  1. Disability index score of 16% or less for the LEFS to assist with reaching prior level of function. MET  2. Patient will demonstrate increased AROM to [facility] to allow for proper joint functioning as indicated by patients Functional Deficits. MET  3. Patient will demonstrate an increase in Strength to good proximal hip strength and control, within 5lb HHD in LE to allow for proper functional mobility as indicated by patients Functional Deficits. MET  4. Patient will return to walking x 45 min without increased symptoms or restriction. MET  5. Pt will negotiate one flight of stairs with reciprocal gait pattern without pain      MET      Progression Towards Functional goals:  [x] Patient is progressing as expected towards functional goals listed. [] Progression is slowed due to complexities listed. [] Progression has been slowed due to co-morbidities.   [] Plan just implemented, too soon to assess goals progression  [] Other:     ASSESSMENT:  Pt presents

## 2019-05-02 ENCOUNTER — OFFICE VISIT (OUTPATIENT)
Dept: ORTHOPEDIC SURGERY | Age: 59
End: 2019-05-02

## 2019-05-02 VITALS — BODY MASS INDEX: 26.4 KG/M2 | HEIGHT: 63 IN | WEIGHT: 149 LBS

## 2019-05-02 DIAGNOSIS — S83.231D COMPLEX TEAR OF MEDIAL MENISCUS OF RIGHT KNEE AS CURRENT INJURY, SUBSEQUENT ENCOUNTER: Primary | ICD-10-CM

## 2019-05-02 DIAGNOSIS — S83.271D COMPLEX TEAR OF LATERAL MENISCUS OF RIGHT KNEE AS CURRENT INJURY, SUBSEQUENT ENCOUNTER: ICD-10-CM

## 2019-05-02 DIAGNOSIS — M17.11 PRIMARY OSTEOARTHRITIS OF RIGHT KNEE: ICD-10-CM

## 2019-05-02 PROCEDURE — 99024 POSTOP FOLLOW-UP VISIT: CPT | Performed by: ORTHOPAEDIC SURGERY

## 2019-05-02 NOTE — PROGRESS NOTES
HISTORY OF PRESENT ILLNESS:   S/P Knee Arthroscopy  The Patient returns today for their postoperative visit after right knee arthroscopy. Pain control has been satisfactory with oral medications. There have been no fevers or chills. PHYSICAL EXAMINATION: Right Knee   Inspection reveals expected swelling. Portal sites are clean and dry. The skin is warm. Range of motion is limited by pain and swelling. There is no calf pain  Homans sign is negative. Examination of the contralateral knee reveals warm skin, range of motion within normal limits, good quadriceps bulk, tone and strength, no tenderness to palpation, stable cruciate and collateral ligaments, and no joint line tenderness. Examination of the Patients Lumbar spine revealsThe skin is warm and dry. There is no swelling, warmth, or erythema. Range of motion is within normal limits. There is no paraspinal or spinous process tenderness. Ipsilateral and contralateral straight leg raising tests are negative. The distal neurovascular exam is grossly intact and symmetric. ASSESSMENT/PLAN:   The patient is doing well after knee arthroscopy. I have recommended ice,judicious use of NSAIDs, and physical therapy to diminish swelling and restore both motion and strength. I cautioned against overusing the knee, and they will schedule a reevaluation in an as-needed basis  They verbalized good understanding of the plan. Disclaimer: \"This note was dictated with voice recognition software. Though review and correction are routine, we apologize for any errors. \"

## 2019-05-14 RX ORDER — CEPHALEXIN 500 MG/1
500 CAPSULE ORAL 3 TIMES DAILY
Qty: 21 CAPSULE | Refills: 0 | Status: SHIPPED | OUTPATIENT
Start: 2019-05-14 | End: 2019-11-27 | Stop reason: SDUPTHER

## 2019-05-14 NOTE — TELEPHONE ENCOUNTER
Patient has a sore throat and fever, she stated she has a history of strep throat. I offered her an appointment to come to the office today or tomorrow. Patient states she doesn't have time to come in this week.     Please Advise      [facility] [address], [address]

## 2019-05-15 DIAGNOSIS — E78.00 HYPERCHOLESTEREMIA: ICD-10-CM

## 2019-05-15 RX ORDER — ATORVASTATIN CALCIUM 20 MG/1
TABLET, FILM COATED ORAL
Qty: 90 TABLET | Refills: 0 | Status: SHIPPED | OUTPATIENT
Start: 2019-05-15 | End: 2019-05-17

## 2019-05-17 RX ORDER — ATORVASTATIN CALCIUM 20 MG/1
TABLET, FILM COATED ORAL
Qty: 90 TABLET | Refills: 1 | Status: SHIPPED | OUTPATIENT
Start: 2019-05-17 | End: 2020-02-10

## 2019-06-15 NOTE — TELEPHONE ENCOUNTER
Medication:   Requested Prescriptions     Pending Prescriptions Disp Refills    triamterene-hydrochlorothiazide (DYAZIDE) 37.5-25 MG per capsule [Pharmacy Med Name: Ruben Alexei 37.5-25 MG CP] 30 capsule 1     Sig: TAKE ONE CAPSULE BY MOUTH EVERY MORNING      Last Filled:  3/13/2019     Patient Phone Number: [phone] (home)     Last appt: 2/12/2019   Next appt: 7/12/2019    Last OARRS:   RX Monitoring 2/4/2016   Attestation The Prescription Monitoring Report for this patient was reviewed today. Periodic Controlled Substance Monitoring No signs of potential drug abuse or diversion identified.        Preferred Pharmacy:   [facility], [address]  [place] [address]  Phone: [phone] Fax: [phone]    [facility] [address] [place] [address], [address] [place] [address] Drive - P [phone] - F [phone]  [address]  [place] [address]  Phone: [phone] Fax: [phone]

## 2019-06-16 RX ORDER — TRIAMTERENE AND HYDROCHLOROTHIAZIDE 37.5; 25 MG/1; MG/1
CAPSULE ORAL
Qty: 30 CAPSULE | Refills: 1 | Status: SHIPPED | OUTPATIENT
Start: 2019-06-16 | End: 2019-07-12 | Stop reason: SDUPTHER

## 2019-07-11 ENCOUNTER — OFFICE VISIT (OUTPATIENT)
Dept: ORTHOPEDIC SURGERY | Age: 59
End: 2019-07-11
Payer: COMMERCIAL

## 2019-07-11 VITALS — WEIGHT: 149 LBS | BODY MASS INDEX: 26.4 KG/M2 | HEIGHT: 63 IN

## 2019-07-11 DIAGNOSIS — M17.11 PRIMARY OSTEOARTHRITIS OF RIGHT KNEE: ICD-10-CM

## 2019-07-11 DIAGNOSIS — S83.271D COMPLEX TEAR OF LATERAL MENISCUS OF RIGHT KNEE AS CURRENT INJURY, SUBSEQUENT ENCOUNTER: ICD-10-CM

## 2019-07-11 DIAGNOSIS — S83.231D COMPLEX TEAR OF MEDIAL MENISCUS OF RIGHT KNEE AS CURRENT INJURY, SUBSEQUENT ENCOUNTER: Primary | ICD-10-CM

## 2019-07-11 PROCEDURE — 20610 DRAIN/INJ JOINT/BURSA W/O US: CPT | Performed by: ORTHOPAEDIC SURGERY

## 2019-07-11 PROCEDURE — 99214 OFFICE O/P EST MOD 30 MIN: CPT | Performed by: ORTHOPAEDIC SURGERY

## 2019-07-11 NOTE — PROGRESS NOTES
patient verbalized understanding and gave informed consent for the injection. The patient's skin was prepped using sterile alcohol solution. A sterile 22-gauge needle was inserted into the right knee and a mixture of 3ml of 6mg/ml Celestone, 7mL of 0.5% Marcaine  was injected under sterile technique. The needle was withdrawn and the puncture site sealed with a Band-Aid. The patient tolerated the injection well. The patient was instructed to call the office immediately if there is any pain, redness, warmth, fever, or chills. Previous Treatments: X-ray, MRI, arthroscopy, physical therapy,    Possible other diagnoses:      Treatment Plan: Injection today and I would like for her to follow-up with Dr. Kaci Ramos for evaluation and treatment for total knee arthroplasty      Melani Butler. IVAN Longoria. [address] [place] [address] and Sports Medicine  Sports Fellowship Trained  Board Certified  [employer] Team Physician        Disclaimer: \"This note was dictated with voice recognition software. Though review and correction are routine, we apologize for any errors. \"

## 2019-07-12 ENCOUNTER — OFFICE VISIT (OUTPATIENT)
Dept: FAMILY MEDICINE CLINIC | Age: 59
End: 2019-07-12
Payer: COMMERCIAL

## 2019-07-12 VITALS
OXYGEN SATURATION: 98 % | DIASTOLIC BLOOD PRESSURE: 70 MMHG | SYSTOLIC BLOOD PRESSURE: 114 MMHG | BODY MASS INDEX: 26.44 KG/M2 | WEIGHT: 149.25 LBS | HEART RATE: 89 BPM | RESPIRATION RATE: 12 BRPM

## 2019-07-12 DIAGNOSIS — H10.13 ALLERGIC CONJUNCTIVITIS OF BOTH EYES: ICD-10-CM

## 2019-07-12 DIAGNOSIS — C18.2 CANCER OF ASCENDING COLON (HCC): ICD-10-CM

## 2019-07-12 DIAGNOSIS — I10 ESSENTIAL HYPERTENSION: Primary | ICD-10-CM

## 2019-07-12 DIAGNOSIS — M17.11 PRIMARY OSTEOARTHRITIS OF RIGHT KNEE: ICD-10-CM

## 2019-07-12 DIAGNOSIS — E78.00 HYPERCHOLESTEREMIA: ICD-10-CM

## 2019-07-12 PROCEDURE — 99214 OFFICE O/P EST MOD 30 MIN: CPT | Performed by: FAMILY MEDICINE

## 2019-07-12 RX ORDER — ALBUTEROL SULFATE 90 UG/1
2 AEROSOL, METERED RESPIRATORY (INHALATION) EVERY 6 HOURS PRN
Qty: 3 INHALER | Refills: 1 | Status: SHIPPED | OUTPATIENT
Start: 2019-07-12

## 2019-07-12 RX ORDER — TRIAMTERENE AND HYDROCHLOROTHIAZIDE 37.5; 25 MG/1; MG/1
CAPSULE ORAL
Qty: 90 CAPSULE | Refills: 1 | Status: SHIPPED | OUTPATIENT
Start: 2019-07-12 | End: 2020-02-10

## 2019-07-12 ASSESSMENT — PATIENT HEALTH QUESTIONNAIRE - PHQ9
2. FEELING DOWN, DEPRESSED OR HOPELESS: 0
SUM OF ALL RESPONSES TO PHQ9 QUESTIONS 1 & 2: 0
1. LITTLE INTEREST OR PLEASURE IN DOING THINGS: 0
SUM OF ALL RESPONSES TO PHQ QUESTIONS 1-9: 0
SUM OF ALL RESPONSES TO PHQ QUESTIONS 1-9: 0

## 2019-07-12 NOTE — PROGRESS NOTES
Subjective:      Patient ID: Natalie Torres is a 60 y.o. female. CC: Patient presents for re-evaluation of chronic health problems including right knee arthritis, allergic rhinitis, hypercholesterol, hypertension and history of colon cancer. HPI Pt is here for a follow up, med refill, test results and will like to have an allergy med send to her pharmacy for her eyes [place]. Patient started more allergic eye problems. She has had a lot of ear problems over the last 6 months and feels this is improved. She unfortunately is having significant right knee problems. She was told by orthopedic specialist despite the surgery that she has extensive arthritis and really needs a right knee replacement. Patient states she has no issues with walking and during things of the day but at nighttime she has significant problems. She has been taking ibuprofen 600 mg twice daily which seem to give her some improvement. She is not sure at this point time when she would like to have knee surgery. She is also been evaluated by both oncology and surgery in regards to the colon carcinoma from 2016. She has been colon cancer free now for the last 3 years and continues on follow-up.     Review of Systems  Patient Active Problem List   Diagnosis    Ulcerative colitis ([facility] [address]    Essential hypertension    Goiter    Irritable bowel syndrome    Meniere's disease    Hypercholesteremia    Cancer of ascending colon ([facility] [address]    Gastroesophageal reflux disease without esophagitis    Cutaneous sarcoidosis (HCC)    Complex tear of medial meniscus of right knee as current injury    Complex tear of lateral meniscus of right knee as current injury    Primary osteoarthritis of right knee       Outpatient Medications Marked as Taking for the 7/12/19 encounter (Office Visit) with Abbie Killian MD   Medication Sig Dispense Refill    triamterene-hydrochlorothiazide (DYAZIDE) 37.5-25 MG per capsule TAKE ONE CAPSULE BY MOUTH EVERY MORNING occurred.           Katie Baker

## 2019-07-16 ENCOUNTER — TELEPHONE (OUTPATIENT)
Dept: RHEUMATOLOGY | Age: 59
End: 2019-07-16

## 2019-07-17 ENCOUNTER — OFFICE VISIT (OUTPATIENT)
Dept: ORTHOPEDIC SURGERY | Age: 59
End: 2019-07-17
Payer: COMMERCIAL

## 2019-07-17 ENCOUNTER — OFFICE VISIT (OUTPATIENT)
Dept: DERMATOLOGY | Age: 59
End: 2019-07-17
Payer: COMMERCIAL

## 2019-07-17 VITALS — WEIGHT: 149.25 LBS | HEIGHT: 63 IN | BODY MASS INDEX: 26.45 KG/M2

## 2019-07-17 DIAGNOSIS — M17.11 PRIMARY OSTEOARTHRITIS OF RIGHT KNEE: Primary | ICD-10-CM

## 2019-07-17 DIAGNOSIS — D22.9 BENIGN NEVUS: ICD-10-CM

## 2019-07-17 DIAGNOSIS — L82.1 SEBORRHEIC KERATOSES: ICD-10-CM

## 2019-07-17 DIAGNOSIS — Z85.828 HISTORY OF BASAL CELL CANCER: ICD-10-CM

## 2019-07-17 DIAGNOSIS — L82.0 SEBORRHEIC KERATOSES, INFLAMED: Primary | ICD-10-CM

## 2019-07-17 PROCEDURE — 99213 OFFICE O/P EST LOW 20 MIN: CPT | Performed by: ORTHOPAEDIC SURGERY

## 2019-07-17 PROCEDURE — 17110 DESTRUCTION B9 LES UP TO 14: CPT | Performed by: DERMATOLOGY

## 2019-07-17 PROCEDURE — 99213 OFFICE O/P EST LOW 20 MIN: CPT | Performed by: DERMATOLOGY

## 2019-07-17 RX ORDER — AZELASTINE HYDROCHLORIDE 0.5 MG/ML
1 SOLUTION/ DROPS OPHTHALMIC 2 TIMES DAILY
Qty: 1 BOTTLE | Refills: 2 | Status: SHIPPED | OUTPATIENT
Start: 2019-07-17 | End: 2021-01-19

## 2019-07-17 NOTE — PROGRESS NOTES
detection   -Call for any new/changing moles or concerning lesions  -Reviewed sun protective behavior -- sun avoidance during the peak hours of the day, sun-protective clothing (including hat and sunglasses), sunscreen use (water resistant, broad spectrum, SPF at least 30, need for reapplication every 2 to 3 hours), avoidance of tanning beds      4. History of basal cell cancer - No evidence of recurrence. Discussed risk of subsequent skin cancers and increased risk of melanoma. Reviewed importance of monitoring skin for change and sun protection with hats and sunscreen, as well as sun avoidance.

## 2019-07-17 NOTE — TELEPHONE ENCOUNTER
Patient will need to try a generic alternative medications as they will not pay for this type of medication

## 2019-08-08 ENCOUNTER — TELEPHONE (OUTPATIENT)
Dept: FAMILY MEDICINE CLINIC | Age: 59
End: 2019-08-08

## 2019-08-09 RX ORDER — EPINASTINE HCL 0.05 %
1 DROPS OPHTHALMIC (EYE) 2 TIMES DAILY
Qty: 1 BOTTLE | Refills: 2 | Status: SHIPPED | OUTPATIENT
Start: 2019-08-09 | End: 2019-08-16

## 2019-08-09 NOTE — TELEPHONE ENCOUNTER
Patient to fail on Optivar and MELYSSA Cain was denied. Please advise. Pt has failed on Optivar, please advise about a new script.

## 2019-08-23 ENCOUNTER — TELEPHONE (OUTPATIENT)
Dept: ORTHOPEDIC SURGERY | Age: 59
End: 2019-08-23

## 2019-09-11 ENCOUNTER — HOSPITAL ENCOUNTER (OUTPATIENT)
Dept: CT IMAGING | Age: 59
Discharge: HOME | End: 2019-09-11
Payer: COMMERCIAL

## 2019-09-11 ENCOUNTER — HOSPITAL ENCOUNTER (OUTPATIENT)
Age: 59
Discharge: HOME | End: 2019-09-11
Payer: COMMERCIAL

## 2019-09-11 DIAGNOSIS — C18.2 CANCER OF ASCENDING COLON (HCC): ICD-10-CM

## 2019-09-11 LAB
A/G RATIO: 1.5 (ref 1.1–2.2)
ALBUMIN SERPL-MCNC: 4.4 G/DL (ref 3.4–5)
ALP BLD-CCNC: 53 U/L (ref 40–129)
ALT SERPL-CCNC: 23 U/L (ref 10–40)
ANION GAP SERPL CALCULATED.3IONS-SCNC: 10 MMOL/L (ref 3–16)
AST SERPL-CCNC: 29 U/L (ref 15–37)
BILIRUB SERPL-MCNC: 0.4 MG/DL (ref 0–1)
BUN BLDV-MCNC: 26 MG/DL (ref 7–20)
CALCIUM SERPL-MCNC: 9.9 MG/DL (ref 8.3–10.6)
CHLORIDE BLD-SCNC: 104 MMOL/L (ref 99–110)
CHOLESTEROL, TOTAL: 136 MG/DL (ref 0–199)
CO2: 29 MMOL/L (ref 21–32)
CREAT SERPL-MCNC: 1 MG/DL (ref 0.6–1.2)
GFR AFRICAN AMERICAN: >60
GFR NON-AFRICAN AMERICAN: 57
GLOBULIN: 2.9 G/DL
GLUCOSE BLD-MCNC: 101 MG/DL (ref 70–99)
HDLC SERPL-MCNC: 45 MG/DL (ref 40–60)
LDL CHOLESTEROL CALCULATED: 55 MG/DL
POTASSIUM SERPL-SCNC: 3.3 MMOL/L (ref 3.5–5.1)
SODIUM BLD-SCNC: 143 MMOL/L (ref 136–145)
TOTAL PROTEIN: 7.3 G/DL (ref 6.4–8.2)
TRIGL SERPL-MCNC: 178 MG/DL (ref 0–150)
VLDLC SERPL CALC-MCNC: 36 MG/DL

## 2019-09-11 PROCEDURE — 80061 LIPID PANEL: CPT

## 2019-09-11 PROCEDURE — 6360000004 HC RX CONTRAST MEDICATION: Performed by: FAMILY MEDICINE

## 2019-09-11 PROCEDURE — 74177 CT ABD & PELVIS W/CONTRAST: CPT

## 2019-09-11 PROCEDURE — 36415 COLL VENOUS BLD VENIPUNCTURE: CPT

## 2019-09-11 PROCEDURE — 80053 COMPREHEN METABOLIC PANEL: CPT

## 2019-09-11 RX ADMIN — IOHEXOL 50 ML: 240 INJECTION, SOLUTION INTRATHECAL; INTRAVASCULAR; INTRAVENOUS; ORAL at 08:44

## 2019-09-11 RX ADMIN — IOPAMIDOL 75 ML: 755 INJECTION, SOLUTION INTRAVENOUS at 08:44

## 2019-09-28 ENCOUNTER — OFFICE VISIT (OUTPATIENT)
Dept: FAMILY MEDICINE CLINIC | Age: 59
End: 2019-09-28
Payer: COMMERCIAL

## 2019-09-28 VITALS
BODY MASS INDEX: 26.22 KG/M2 | SYSTOLIC BLOOD PRESSURE: 110 MMHG | WEIGHT: 148 LBS | HEART RATE: 87 BPM | OXYGEN SATURATION: 97 % | DIASTOLIC BLOOD PRESSURE: 80 MMHG

## 2019-09-28 DIAGNOSIS — J06.9 VIRAL URI: ICD-10-CM

## 2019-09-28 DIAGNOSIS — M54.32 SCIATICA OF LEFT SIDE: Primary | ICD-10-CM

## 2019-09-28 PROCEDURE — 99213 OFFICE O/P EST LOW 20 MIN: CPT | Performed by: FAMILY MEDICINE

## 2019-09-28 RX ORDER — PREDNISONE 10 MG/1
TABLET ORAL
Qty: 30 TABLET | Refills: 0 | Status: SHIPPED | OUTPATIENT
Start: 2019-09-28 | End: 2020-01-13

## 2019-09-28 ASSESSMENT — ENCOUNTER SYMPTOMS
BACK PAIN: 1
SORE THROAT: 1
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
## 2019-10-16 ENCOUNTER — OFFICE VISIT (OUTPATIENT)
Dept: ORTHOPEDIC SURGERY | Age: 59
End: 2019-10-16
Payer: COMMERCIAL

## 2019-10-16 VITALS — HEIGHT: 63 IN | RESPIRATION RATE: 16 BRPM | BODY MASS INDEX: 26.21 KG/M2 | WEIGHT: 147.93 LBS

## 2019-10-16 DIAGNOSIS — M17.11 PRIMARY OSTEOARTHRITIS OF RIGHT KNEE: Primary | ICD-10-CM

## 2019-10-16 PROCEDURE — 20610 DRAIN/INJ JOINT/BURSA W/O US: CPT | Performed by: ORTHOPAEDIC SURGERY

## 2019-10-16 PROCEDURE — 99213 OFFICE O/P EST LOW 20 MIN: CPT | Performed by: ORTHOPAEDIC SURGERY

## 2019-11-27 ENCOUNTER — TELEPHONE (OUTPATIENT)
Dept: FAMILY MEDICINE CLINIC | Age: 59
End: 2019-11-27

## 2019-11-27 RX ORDER — CEPHALEXIN 500 MG/1
500 CAPSULE ORAL 3 TIMES DAILY
Qty: 21 CAPSULE | Refills: 0 | Status: SHIPPED | OUTPATIENT
Start: 2019-11-27 | End: 2022-01-14 | Stop reason: SDUPTHER

## 2020-01-13 ENCOUNTER — OFFICE VISIT (OUTPATIENT)
Dept: FAMILY MEDICINE CLINIC | Age: 60
End: 2020-01-13
Payer: COMMERCIAL

## 2020-01-13 VITALS
OXYGEN SATURATION: 96 % | BODY MASS INDEX: 27.18 KG/M2 | DIASTOLIC BLOOD PRESSURE: 80 MMHG | WEIGHT: 153.4 LBS | SYSTOLIC BLOOD PRESSURE: 120 MMHG | HEART RATE: 87 BPM

## 2020-01-13 PROCEDURE — 99214 OFFICE O/P EST MOD 30 MIN: CPT | Performed by: FAMILY MEDICINE

## 2020-01-13 RX ORDER — FAMOTIDINE 20 MG/1
20 TABLET, FILM COATED ORAL 2 TIMES DAILY
COMMUNITY
End: 2022-06-08

## 2020-01-13 ASSESSMENT — PATIENT HEALTH QUESTIONNAIRE - PHQ9
SUM OF ALL RESPONSES TO PHQ9 QUESTIONS 1 & 2: 0
SUM OF ALL RESPONSES TO PHQ QUESTIONS 1-9: 0
2. FEELING DOWN, DEPRESSED OR HOPELESS: 0
SUM OF ALL RESPONSES TO PHQ QUESTIONS 1-9: 0
1. LITTLE INTEREST OR PLEASURE IN DOING THINGS: 0

## 2020-01-13 NOTE — PROGRESS NOTES
Subjective:      Patient ID: Alicia Frank is a 60 y.o. female. CC: Patient presents for re-evaluation of chronic health problems including hypertension, hyperglycemia, hyperlipidemia, increasing osteoarthritis of the right knee and GERD. HPI Patient presents today for a follow-up on chronic medications and medical conditions. Patient wants to go over her lab results. Patient feels other than her arthritis she is done well over the last 6 months. She knows her right knee is wearing out and she is having more difficulty. She does a lot of babysitting with her grandchildren and is not sure when she could undergo surgical intervention. She is not using a walker or cane at this point of time. She continues to get cortisone injections several times a year. Otherwise she feels she is doing well. She continues under ENT specially care for ear issues. GERD symptoms are well controlled. Patient is very compliant with medications with no adverse reactions.     Review of Systems      Patient Active Problem List   Diagnosis    Ulcerative colitis ([facility] [address]    Essential hypertension    Goiter    Irritable bowel syndrome    Meniere's disease    Hypercholesteremia    Cancer of ascending colon ([facility] [address]    Gastroesophageal reflux disease without esophagitis    Cutaneous sarcoidosis (HCC)    Complex tear of medial meniscus of right knee as current injury    Complex tear of lateral meniscus of right knee as current injury    Primary osteoarthritis of right knee    Allergic conjunctivitis of both eyes       Outpatient Medications Marked as Taking for the 1/13/20 encounter (Office Visit) with Socorro Harris MD   Medication Sig Dispense Refill    famotidine (PEPCID) 20 MG tablet Take 20 mg by mouth 2 times daily      Olopatadine HCl 0.7 % SOLN 1 qd PRN 1 Bottle 3    azelastine (OPTIVAR) 0.05 % ophthalmic solution Place 1 drop into both eyes 2 times daily 1 Bottle 2    triamterene-hydrochlorothiazide (DYAZIDE) Pulmonary effort is normal.      Breath sounds: Normal breath sounds. Musculoskeletal:         General: No tenderness. Right knee: She exhibits decreased range of motion, swelling and deformity (Enlargement with angulation of the joint and crepitus). She exhibits no LCL laxity, normal patellar mobility and no MCL laxity. No tenderness found. Left knee: Normal.      Right lower leg: No edema. Left lower leg: No edema. Right foot: Normal.      Left foot: Normal.   Neurological:      Mental Status: She is alert. Sensory: Sensation is intact. No sensory deficit. Motor: Motor function is intact. Deep Tendon Reflexes: Reflexes are normal and symmetric. Comments: 12 point monofilament test normal    Psychiatric:         Behavior: Behavior is cooperative. Assessment:      Matt [place] was seen today for 6 month follow-up and hypertension. Diagnoses and all orders for this visit:    Essential hypertension  -     Comprehensive Metabolic Panel; Future    Hypercholesteremia    Primary osteoarthritis of right knee    Gastroesophageal reflux disease without esophagitis    Hyperglycemia  -     Hemoglobin A1C; Future            Plan:      Pt appears stable & reviewed labs with patient. No change in current medications. Continue under care of ENT specialist and orthopedic specialist.    Patient received counseling on the following healthy behaviors: nutrition and exercise     Patient given educational materials     Health maintenance updated    Discussed use, benefit, and side effects of prescribed medications. Barriers to medication compliance addressed. All patient questions answered. Pt voiced understanding. Patient needs RTC in 6 months. Please note that this chart was generated using Dragon dictation software. Although every effort was made to ensure the accuracy of this automated transcription, some errors in transcription may have occurred.

## 2020-01-29 ENCOUNTER — OFFICE VISIT (OUTPATIENT)
Dept: ORTHOPEDIC SURGERY | Age: 60
End: 2020-01-29
Payer: COMMERCIAL

## 2020-01-29 VITALS — HEIGHT: 63 IN | WEIGHT: 153.44 LBS | BODY MASS INDEX: 27.19 KG/M2

## 2020-01-29 PROCEDURE — 20610 DRAIN/INJ JOINT/BURSA W/O US: CPT | Performed by: ORTHOPAEDIC SURGERY

## 2020-01-29 PROCEDURE — 99213 OFFICE O/P EST LOW 20 MIN: CPT | Performed by: ORTHOPAEDIC SURGERY

## 2020-01-29 RX ORDER — METHYLPREDNISOLONE ACETATE 40 MG/ML
80 INJECTION, SUSPENSION INTRA-ARTICULAR; INTRALESIONAL; INTRAMUSCULAR; SOFT TISSUE ONCE
Status: COMPLETED | OUTPATIENT
Start: 2020-01-29 | End: 2020-01-29

## 2020-01-29 RX ADMIN — METHYLPREDNISOLONE ACETATE 80 MG: 40 INJECTION, SUSPENSION INTRA-ARTICULAR; INTRALESIONAL; INTRAMUSCULAR; SOFT TISSUE at 09:21

## 2020-02-10 RX ORDER — TRIAMTERENE AND HYDROCHLOROTHIAZIDE 37.5; 25 MG/1; MG/1
CAPSULE ORAL
Qty: 90 CAPSULE | Refills: 1 | Status: SHIPPED | OUTPATIENT
Start: 2020-02-10 | End: 2020-07-13 | Stop reason: SDUPTHER

## 2020-02-10 RX ORDER — ATORVASTATIN CALCIUM 20 MG/1
TABLET, FILM COATED ORAL
Qty: 90 TABLET | Refills: 1 | Status: SHIPPED | OUTPATIENT
Start: 2020-02-10 | End: 2020-07-13 | Stop reason: SDUPTHER

## 2020-03-02 ENCOUNTER — OFFICE VISIT (OUTPATIENT)
Dept: DERMATOLOGY | Age: 60
End: 2020-03-02
Payer: COMMERCIAL

## 2020-03-02 PROCEDURE — 11103 TANGNTL BX SKIN EA SEP/ADDL: CPT | Performed by: DERMATOLOGY

## 2020-03-02 PROCEDURE — 11102 TANGNTL BX SKIN SINGLE LES: CPT | Performed by: DERMATOLOGY

## 2020-03-02 PROCEDURE — 99214 OFFICE O/P EST MOD 30 MIN: CPT | Performed by: DERMATOLOGY

## 2020-03-02 NOTE — PROGRESS NOTES
Non-medical: Not on file   Tobacco Use    Smoking status: Never Smoker    Smokeless tobacco: Never Used   Substance and Sexual Activity    Alcohol use: Yes     Alcohol/week: 0.0 standard drinks     Comment: ONE DRINK PER WEEK    Drug use: No    Sexual activity: Not on file   Lifestyle    Physical activity:     Days per week: Not on file     Minutes per session: Not on file    Stress: Not on file   Relationships    Social connections:     Talks on phone: Not on file     Gets together: Not on file     Attends Yarsani service: Not on file     Active member of club or organization: Not on file     Attends meetings of clubs or organizations: Not on file     Relationship status: Not on file    Intimate partner violence:     Fear of current or ex partner: Not on file     Emotionally abused: Not on file     Physically abused: Not on file     Forced sexual activity: Not on file   Other Topics Concern    Not on file   Social History Narrative    Not on file       Physical Examination       -General: Well-appearing, NAD  1. Normal affect. Total body skin exam including scalp, face, neck, chest, abdomen, back, bilateral upper extremities, bilateral lower extremities, ocular conjunctiva, external lips, and nails was performed. Examination normal unless stated below. Underwear area not examined. Scattered on the trunk and extremities are multiple well-defined round and oval symmetric smoothly-bordered uniformly brown macules and papules. 4 mm scaly raised pink papule right glabella  7 mm pink papule left medial lower leg  7 mm pink papule right medial lower leg  Hyperkeratotic stuck on tan papule left preauricular cheek-benign seborrheic keratosis. Well-healed scars no sign of recurrence                Assessment and Plan     1. Neoplasm of uncertain behavior of skin-left medial lower leg, right medial lower leg, right glabella--Discussed possible diagnosis. Patient agreeable to biopsy.  Verbal consent obtained after risks (infection, bleeding, scar), benefits and alternatives explained. -Area(s) to be biopsied were marked with a surgical pen. Site(s) were cleansed with alcohol. Local anesthesia achieved with 1% lidocaine with epinephrine/sodium bicarbonate. Shave biopsy performed with a razor blade. Hemostasis was achieved with aluminum chloride. The wound(s) were dressed with petrolatum and covered with a bandage. Specimen(s) sent to pathology. Pt educated re: risk of bleeding, infection, scar and wound care instructions. 2. Benign nevus - Benign acquired melanocytic nevi  -Recommend monthly self skin exams   -Educated regarding the ABCDEs of melanoma detection   -Call for any new/changing moles or concerning lesions  -Reviewed sun protective behavior -- sun avoidance during the peak hours of the day, sun-protective clothing (including hat and sunglasses), sunscreen use (water resistant, broad spectrum, SPF at least 30, need for reapplication every 2 to 3 hours), avoidance of tanning beds      3. Seborrheic keratoses-monitor for change   4. History of basal cell cancer - No evidence of recurrence. Discussed risk of subsequent skin cancers and increased risk of melanoma. Reviewed importance of monitoring skin for change and sun protection with hats and sunscreen, as well as sun avoidance.

## 2020-03-06 LAB — DERMATOLOGY PATHOLOGY REPORT: ABNORMAL

## 2020-03-09 ENCOUNTER — TELEPHONE (OUTPATIENT)
Dept: DERMATOLOGY | Age: 60
End: 2020-03-09

## 2020-04-14 ENCOUNTER — TELEPHONE (OUTPATIENT)
Dept: SURGERY | Age: 60
End: 2020-04-14

## 2020-04-14 NOTE — TELEPHONE ENCOUNTER
As a reminder, you were referred to Dr. Sabas Crook by (name of referring provider) Dr. Kate Emery for the treatment of a (diagnosis) BCC superficial on your (body part) R glabella. The biopsy was performed on (date of biopsy) 3/2/2020. As you have been informed, we have had to delay most surgical cases due to the Covid 19 pandemic. This was and continues to be an effort to keep you safe and prevent any chance of inadvertent exposure to the virus. The medical community is still learning about how the virus can be transmitted but we know there is a high likelihood of asymptomatic infections at this point that could lead to exposure despite our best efforts. We are calling today to update you on where things  regards to scheduling surgical cases. As you might remember at the start of the pandemic, doctors' offices across the state and the country were asked to stop seeing all non-essential care. Based on recommendations from the [employer], [address], skin cancer treatment that was defined as necessary were in cases of invasive melanoma, poorly differentiated squamous cell carcinoma, or rapidly growing or symptomatic skin cancers of other types. Dr. Sabas Crook reviewed your case in March and either determined that it did not fall within these guidelines or she may have contacted you directly to assess your symptoms and determine if you needed to be seen urgently. Now we are reaching out to let you know that we are still not in a position to schedule surgical cases.   The [facility] that Dr. Sabas Crook works for has informed her that she can not perform any surgical cases unless they are deemed urgent and all cases must be submitted to the hospital Committee for review and approval.   So while your case does not fall within the urgent guidelines as outlined by various organizations, if you feel your cases is urgent, Dr. Sabas Crook can submit your case to the [address] [place] [address] for review. In addition, if you would like to speak to Dr. Nilesh Carias directly about the details of your case, I can schedule a phone visit for you. We are very much hoping we can start scheduling surgical cases again at the beginning of May, but we still can not say with certainty when we will be able to schedule your case. Would you like Dr. Nilesh Carias to submit your case to the hospital for review?  no    Would you like to schedule a phone appointment with Dr. Nilesh Carias? no    Do you have any other questions?  no    We will call you again in 2-3 weeks with another update. Please contact us before then if you have any questions or concerns.

## 2020-05-19 ENCOUNTER — TELEPHONE (OUTPATIENT)
Dept: SURGERY | Age: 60
End: 2020-05-19

## 2020-05-19 NOTE — TELEPHONE ENCOUNTER
Contacted pt today to offer Mohs appt for week of 5/26 but she's unable to stay in quarantine for 7 days due to watching her niece and daughters toddlers. 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 I'm checking with Dr. Guero Mares to see what she recommends.

## 2020-05-21 NOTE — TELEPHONE ENCOUNTER
Spoke with patient again and informed her that if she really wants Dr. Ponce Powell to treat her to wait about 2 weeks to see if the turn around time for Covid19 results shortens. She was in agreement of this and will call me 2 weeks from today unless she hears from me sooner. Including Dr. Joe Koch on this message as well.

## 2020-05-21 NOTE — TELEPHONE ENCOUNTER
Pastor Mendoza would do a great job, and is not requiring testing or quarantine. Please let patient know.

## 2020-05-22 NOTE — TELEPHONE ENCOUNTER
We had already given the patient Black name. She expressed interest in having me perform the surgery so we told her quarantine times may be shorter as testing becomes more efficient so we could let her know in 2 weeks if that is the case or she can proceed and schedule with Joy Arana now. We will be telling all of your patients basically that same info or we can have them call you if you would prefer? Thanks!

## 2020-05-26 ENCOUNTER — TELEPHONE (OUTPATIENT)
Dept: DERMATOLOGY | Age: 60
End: 2020-05-26

## 2020-05-27 ENCOUNTER — OFFICE VISIT (OUTPATIENT)
Dept: DERMATOLOGY | Age: 60
End: 2020-05-27
Payer: COMMERCIAL

## 2020-05-27 VITALS — TEMPERATURE: 97.1 F

## 2020-05-27 PROCEDURE — 17261 DSTRJ MAL LES T/A/L .6-1.0CM: CPT | Performed by: DERMATOLOGY

## 2020-05-27 PROCEDURE — 99213 OFFICE O/P EST LOW 20 MIN: CPT | Performed by: DERMATOLOGY

## 2020-05-27 PROCEDURE — 17260 DSTRJ MAL LES T/A/L 0.5 CM/<: CPT | Performed by: DERMATOLOGY

## 2020-05-27 RX ORDER — POTASSIUM CHLORIDE 1500 MG/1
TABLET, FILM COATED, EXTENDED RELEASE ORAL
COMMUNITY
Start: 2020-05-22 | End: 2020-07-13 | Stop reason: SDUPTHER

## 2020-05-27 NOTE — PROGRESS NOTES
education: Not on file    Highest education level: Not on file   Occupational History    Not on file   Social Needs    Financial resource strain: Not on file    Food insecurity     Worry: Not on file     Inability: Not on file    Transportation needs     Medical: Not on file     Non-medical: Not on file   Tobacco Use    Smoking status: Never Smoker    Smokeless tobacco: Never Used   Substance and Sexual Activity    Alcohol use: Yes     Alcohol/week: 0.0 standard drinks     Comment: ONE DRINK PER WEEK    Drug use: No    Sexual activity: Not on file   Lifestyle    Physical activity     Days per week: Not on file     Minutes per session: Not on file    Stress: Not on file   Relationships    Social connections     Talks on phone: Not on file     Gets together: Not on file     Attends Orthodoxy service: Not on file     Active member of club or organization: Not on file     Attends meetings of clubs or organizations: Not on file     Relationship status: Not on file    Intimate partner violence     Fear of current or ex partner: Not on file     Emotionally abused: Not on file     Physically abused: Not on file     Forced sexual activity: Not on file   Other Topics Concern    Not on file   Social History Narrative    Not on file       Physical Examination       -General: Well-appearing, NAD  Limited exam.  Well-healed scar left and right medial lower leg. Sites confirmed with previous picture and photograph  Well-healed scar glabella  Multiple benign nevi left forearm  Left thenar forearm 3 mm erythematous papule closely adjacent but separate from benign well-circumscribed nevus            Assessment and Plan     1. Neoplasm of uncertain behavior of skin-left thenar forearm--Discussed possible diagnosis. Patient agreeable to biopsy. Verbal consent obtained after risks (infection, bleeding, scar), benefits and alternatives explained. -Area(s) to be biopsied were marked with a surgical pen.  Site(s) were

## 2020-05-28 ENCOUNTER — HOSPITAL ENCOUNTER (OUTPATIENT)
Dept: CT IMAGING | Age: 60
Discharge: HOME | End: 2020-05-28
Payer: COMMERCIAL

## 2020-05-28 PROCEDURE — 6360000004 HC RX CONTRAST MEDICATION: Performed by: INTERNAL MEDICINE

## 2020-05-28 PROCEDURE — 74177 CT ABD & PELVIS W/CONTRAST: CPT

## 2020-05-28 RX ADMIN — IOPAMIDOL 75 ML: 755 INJECTION, SOLUTION INTRAVENOUS at 16:49

## 2020-05-28 RX ADMIN — IOHEXOL 50 ML: 240 INJECTION, SOLUTION INTRATHECAL; INTRAVASCULAR; INTRAVENOUS; ORAL at 16:48

## 2020-05-29 LAB — DERMATOLOGY PATHOLOGY REPORT: ABNORMAL

## 2020-06-01 ENCOUNTER — TELEPHONE (OUTPATIENT)
Dept: DERMATOLOGY | Age: 60
End: 2020-06-01

## 2020-06-01 ENCOUNTER — PROCEDURE VISIT (OUTPATIENT)
Dept: SURGERY | Age: 60
End: 2020-06-01
Payer: COMMERCIAL

## 2020-06-01 VITALS — TEMPERATURE: 97.4 F

## 2020-06-01 PROBLEM — C44.319 BASAL CELL CARCINOMA OF GLABELLA: Status: ACTIVE | Noted: 2020-06-01

## 2020-06-01 PROCEDURE — 12051 INTMD RPR FACE/MM 2.5 CM/<: CPT | Performed by: DERMATOLOGY

## 2020-06-01 PROCEDURE — 17311 MOHS 1 STAGE H/N/HF/G: CPT | Performed by: DERMATOLOGY

## 2020-06-01 NOTE — PATIENT INSTRUCTIONS
[facility] Office Hours:    Monday-Thursday  7:30 AM-4:30 PM    Friday  9:00 AM-1:00 PM    POST-OPERATIVE CARE FOR DISSOLVING STICHES             Bandage change after 48 hours    During your procedure today, dissolving sutures, or stiches, were used to close the wound. You do not have to have stiches removed. They will dissolve (melt away) on their own. Please follow these instructions to help you recover from your procedure and help your wound heal.    CARING FOR YOUR SURGICAL SITE  The bandage should remain on and completley dry for 48 hours. Do NOT get the bandage wet. 1. After the first 48 hours, gently remove the remaining part of the bandage. It can be helpful to moisten the bandage edges in the shower. Steri strips may still be on the wound. It is ok, they will fall off slowly with the daily bandage changes. 2. Gently clean AROUND the wound daily with mild soap and water. Please avoid the area from getting wet. The more moisture is on the sutures the quicker they will dissolve. 3. Dry (pat) the area with a clean Q-tip or gauze. Try to clean off any debris or crust from the area. 4. Apply a layer of Vaseline/ Aquaphor (or Bacitracin if your doctor recommends) to the wound area only. 5. Cut a piece of Telfa (or any non-stick dressing) to fit just over the wound and secure it with paper tape. If the wound is small you may use a Band- Aid. Keep area covered for a total of 1 week(s). If the dressing comes off or if you have questions, or concerns about the dressing, please call the office for instructions! POST OPERATIVE INSTRUCTIONS    1. Activity: Do not lift anything heavier than a gallon of milk for 1 week. Also, avoid strenuous activity such as running, power walking or contact sports. 2. Eating and drinking: Do not drink alcohol for 48 hours after your procedure. Alcohol increases the chances of bleeding.   3. Medicines   -If you have discomfort, take Acetaminophen (Tylenol or Extra Strength Tylenol). Follow the instructions and warning on the bottle. -If your doctor has prescribed you an Aspirin daily, please keep taking it. Do not take extra Aspirin or medicines containing Aspirin (such as Jeny-Bureau and Excedrin) for 48 hours  after your procedure. Bleeding: If bleeding occurs, DO NOT remove the bandage. Put firm pressure on the area with gauze for 20 minutes without peeking. If the bleeding continue, apply pressure for another 20 minutes. If the bleeding does not stop after you apply pressure, call us right away. If you can not call, go to the nearest emergency room or urgent care facility. What to expect:  You may have these symptoms. They are normal and should get better with time:  1. Swelling. Swelling usually increases for the first 48 hours after your procedure and then begins to improve. Some soreness and redness around your wound. If we worked close to you eyes  (forehead, nose, temple, or upper cheeks) your eyes may become swollen and/ or black and blue. 2. Bruising, which could last 1 week or more. 3. Pink and bumpy appearance to the scar. This may happen a few weeks after your procedure. After 4 weeks, you may gently massage the area each day with facial moisturizer or petroleum jelly (Vaseline or Aquaphor). This will help to smooth the skin and improve the appearance of the scar. The color of your scar will fade over time, but may be pink for several months after the procedure. The scar may take 6 months to 1 year to reach its final color and appearance. 4. \"Spitting\" suture. Occasionally, an inside suture (stitch) does not completely dissolve. When this happens, (generally 4-8 weeks after surgery), it causes a bump or \"pimple\" to form on the scar. This is easily removed and is not at all serious. It does not mean the skin cancer has returned. Contact us if it happens, but do not be alarmed. Vitamin E oil is NOT necessary.  A good moisturizer is just as

## 2020-06-01 NOTE — PROGRESS NOTES
The patient was counseled at length about the risks of kaveh Covid-19 during their perioperative period and any recovery window from their procedure. The patient was made aware that kaveh Covid-19  may worsen their prognosis for recovering from their procedure  and lend to a higher morbidity and/or mortality risk. All material risks, benefits, and reasonable alternatives including postponing the procedure were discussed. The patient does wish to proceed with the procedure at this time.

## 2020-06-01 NOTE — PROGRESS NOTES
using the technique of Mohs. A map was prepared to correspond to the area of skin from which it was excised. Hemostasis was achieved using electrosurgery. The wound was bandaged. The tissue was prepared for the cryostat and sectioned. 1 section(s) prepared. Each section was coded, cut, and stained for microscopic examination. The entire base and margins of the excised piece of tissue were examined by the surgeon. No tumor was identified at the peripheral margins of stage I of microscopically controlled surgery. DEFECT MANAGEMENT:    REPAIR DESCRIPTION:  Various closure modalities were discussed with the patient, and it was decided that an intermediate layered repair would best preserve normal anatomic and functional relationships. Additional risk of wound dehiscence was discussed. The area was anesthetized with 1% lidocaine with epinephrine 1:100,000 buffered, was given a sterile prep using Betadine and draped in the usual sterile fashion. Recreation and enlargement of the wound was performed by excising cones of tissue via the triangulation technique. The final incision lines were placed with respect for the patient's natural skin tension lines in a linear configuration to avoid functional and aesthetic distortion of adjacent free margins. Following minimal undermining, meticulous hemostasis was obtained with spot monopolar electrocoagulation. Subcutaneous dead space and dermis were closed using 5-0 Vicryl buried subcutaneous interrupted suture and the epidermis was approximated with 5-0 Fast absorbing gut running epidermal sutures. WOUND COVERAGE:  The wound was cleaned with normal saline solution, dried off, Aquaphor ointment was applied, and the wound was covered. A dressing was applied for stabilization and light pressure. The patient was given detailed oral and written instructions on postoperative care. There were no complications.   The patient left the Unit in good medical condition. FOLLOW-UP:  As dissolving sutures were placed, the patient was asked to return if any questions or concerns arose, but otherwise will return to see general dermatology per their instructions.

## 2020-06-02 ENCOUNTER — TELEPHONE (OUTPATIENT)
Dept: SURGERY | Age: 60
End: 2020-06-02

## 2020-06-03 ENCOUNTER — OFFICE VISIT (OUTPATIENT)
Dept: ORTHOPEDIC SURGERY | Age: 60
End: 2020-06-03
Payer: COMMERCIAL

## 2020-06-03 VITALS — BODY MASS INDEX: 27.19 KG/M2 | HEIGHT: 63 IN | RESPIRATION RATE: 17 BRPM | WEIGHT: 153.44 LBS

## 2020-06-03 PROCEDURE — 20610 DRAIN/INJ JOINT/BURSA W/O US: CPT | Performed by: ORTHOPAEDIC SURGERY

## 2020-06-03 PROCEDURE — 99213 OFFICE O/P EST LOW 20 MIN: CPT | Performed by: ORTHOPAEDIC SURGERY

## 2020-06-03 RX ORDER — METHYLPREDNISOLONE ACETATE 40 MG/ML
80 INJECTION, SUSPENSION INTRA-ARTICULAR; INTRALESIONAL; INTRAMUSCULAR; SOFT TISSUE ONCE
Status: COMPLETED | OUTPATIENT
Start: 2020-06-03 | End: 2020-06-03

## 2020-06-03 RX ADMIN — METHYLPREDNISOLONE ACETATE 80 MG: 40 INJECTION, SUSPENSION INTRA-ARTICULAR; INTRALESIONAL; INTRAMUSCULAR; SOFT TISSUE at 08:50

## 2020-06-03 NOTE — PROGRESS NOTES
Ruby Miranda 46 #:  [id number]    LOT #:  [id number]    Comment:
instability. There are no rashes, ulcerations or lesions. Strength and tone are normal.      Assessment : Right knee osteoarthritis    Impression:  Encounter Diagnosis   Name Primary?  Primary osteoarthritis of right knee Yes       Office Procedures:  Orders Placed This Encounter   Procedures    58101 - TX DRAIN/INJECT LARGE JOINT/BURSA       Treatment Plan: We will proceed with cortisone injection into the right knee today. She is agreeable with this plan. There is no limitations with her level of activity I did encourage her to continue with low impact exercise. She does know that she is a candidate for total joint replacement in the future. Under sterile conditions the right knee was injected through superior lateral approach with 2 cc of 40 mg Depo-Medrol mixed with 3 cc of quarter percent Marcaine. She did tolerate the injection well.   Postinjection precautions were given

## 2020-07-02 ENCOUNTER — OFFICE VISIT (OUTPATIENT)
Dept: SURGERY | Age: 60
End: 2020-07-02
Payer: COMMERCIAL

## 2020-07-02 VITALS
BODY MASS INDEX: 26.05 KG/M2 | SYSTOLIC BLOOD PRESSURE: 126 MMHG | WEIGHT: 147 LBS | DIASTOLIC BLOOD PRESSURE: 79 MMHG | TEMPERATURE: 97.1 F

## 2020-07-02 PROCEDURE — 99213 OFFICE O/P EST LOW 20 MIN: CPT | Performed by: SURGERY

## 2020-07-02 NOTE — PROGRESS NOTES
pericardial effusion. Small hiatal hernia seen. There is nonspecific thickening at the GE junction. Lungs/pleura: Respiratory motion artifact limits evaluation of fine pulmonary   parenchymal change. No obstructing endobronchial lesions are seen. No   pneumonia. No edema. No pleural effusion. No focal lung consolidation. Punctate pulmonary nodule left upper lobe is unchanged. Soft Tissues/Bones: Spurring is seen in the spine           Abdomen/Pelvis:       Organs: No splenomegaly. No perisplenic fluid       Adrenal glands appear normal.       No hydronephrosis on the right. No hydronephrosis on the left. Scattered   circumscribed hypodense nodules seen throughout the right and left kidney,   too small to characterize, likely cyst       No intrahepatic ductal dilatation. No perihepatic fluid. There are clips   from prior cholecystectomy       No peripancreatic inflammatory change       GI/Bowel: Moderate stool seen in the colon. No significant small or large   bowel distention noted. A few colonic diverticula are seen. Pelvis: Bladder is incompletely distended, accentuating its wall thickness. No pelvic adenopathy       Peritoneum/Retroperitoneum: No retroperitoneal adenopathy. No aortic   aneurysm. Bones/Soft Tissues: Spurring is seen in the spine. Spurring is seen in the   hips. Impression   Stable chest CT.   Tiny punctate pulmonary nodule left upper lobe is unchanged       Stable CT abdomen and pelvis           ASSESSMENT AND PLAN:  Colon cancer  Doing well No clinical recurrence, and last CYT looked fine  Colonoscopy with Dr. Lolly Mackenzie in one year  See back in one year      Nicanor Kyle

## 2020-07-13 ENCOUNTER — OFFICE VISIT (OUTPATIENT)
Dept: FAMILY MEDICINE CLINIC | Age: 60
End: 2020-07-13
Payer: COMMERCIAL

## 2020-07-13 VITALS
SYSTOLIC BLOOD PRESSURE: 124 MMHG | BODY MASS INDEX: 25.96 KG/M2 | WEIGHT: 146.5 LBS | OXYGEN SATURATION: 98 % | TEMPERATURE: 97.7 F | HEART RATE: 63 BPM | DIASTOLIC BLOOD PRESSURE: 74 MMHG | RESPIRATION RATE: 12 BRPM

## 2020-07-13 PROCEDURE — 99214 OFFICE O/P EST MOD 30 MIN: CPT | Performed by: FAMILY MEDICINE

## 2020-07-13 RX ORDER — ATORVASTATIN CALCIUM 20 MG/1
20 TABLET, FILM COATED ORAL DAILY
Qty: 90 TABLET | Refills: 1 | Status: SHIPPED | OUTPATIENT
Start: 2020-07-13 | End: 2020-08-10

## 2020-07-13 RX ORDER — TRIAMTERENE AND HYDROCHLOROTHIAZIDE 37.5; 25 MG/1; MG/1
1 CAPSULE ORAL DAILY
Qty: 90 CAPSULE | Refills: 1 | Status: SHIPPED | OUTPATIENT
Start: 2020-07-13 | End: 2021-01-19 | Stop reason: SDUPTHER

## 2020-07-13 RX ORDER — POTASSIUM CHLORIDE 1500 MG/1
20 TABLET, FILM COATED, EXTENDED RELEASE ORAL DAILY
Qty: 90 TABLET | Refills: 3 | Status: SHIPPED | OUTPATIENT
Start: 2020-07-13 | End: 2021-01-19 | Stop reason: SDUPTHER

## 2020-07-13 NOTE — PROGRESS NOTES
Subjective:      Patient ID: Willy Mccartney is a 60 y.o. female. CC: Patient presents for re-evaluation of chronic health problems including hyperglycemia, hypokalemia, hyperlipidemia, history of colon cancer, osteoarthritis and GERD. Shiv GONZALEZ Pt is here for a follow up and will like to discuss potassium pills. Pt was just given 2 months supplies and is not sure if she is to continue after that. Her biggest complaint is her arthritis of her right knee. She would like to extend out as long as possible before she has knee surgery. She knows she needs a knee replacement. She still walking although not as much as she was before. 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 Patient was started on potassium replacement as she saw Dr. Leo Blanco for follow-up of her cancer. Recent CT scan does not demonstrate a recurrence of the cancer. CEA levels are still slightly elevated which is been unchanged since her surgery. She denies any chest pain shortness of breath symptoms activity. Patient is very compliant with medications with no adverse reactions.     Review of Systems  Patient Active Problem List   Diagnosis    Ulcerative colitis ([address]    Essential hypertension    Goiter    Irritable bowel syndrome    Meniere's disease    Hypercholesteremia    Cancer of ascending colon ([address]    Gastroesophageal reflux disease without esophagitis    Cutaneous sarcoidosis (HCC)    Complex tear of medial meniscus of right knee as current injury    Complex tear of lateral meniscus of right knee as current injury    Primary osteoarthritis of right knee    Allergic conjunctivitis of both eyes    Basal cell carcinoma of glabella       Outpatient Medications Marked as Taking for the 7/13/20 encounter (Office Visit) with Beatriz Olivares MD   Medication Sig Dispense Refill    potassium chloride (KLOR-CON M) 20 MEQ TBCR extended release tablet       mupirocin (BACTROBAN) 2 % ointment Ply daily under Band-Aid 22 g 0    atorvastatin (LIPITOR) 20 MG tablet TAKE 1 TABLET BY MOUTH ONE TIME A DAY  90 tablet 1    triamterene-hydrochlorothiazide (DYAZIDE) 37.5-25 MG per capsule TAKE ONE CAPSULE BY MOUTH EVERY MORNING 90 capsule 1    famotidine (PEPCID) 20 MG tablet Take 20 mg by mouth 2 times daily      Olopatadine HCl 0.7 % SOLN 1 qd PRN 1 Bottle 3    azelastine (OPTIVAR) 0.05 % ophthalmic solution Place 1 drop into both eyes 2 times daily 1 Bottle 2    albuterol sulfate HFA (PROAIR HFA) 108 (90 Base) MCG/ACT inhaler Inhale 2 puffs into the lungs every 6 hours as needed for Wheezing 3 Inhaler 1    triamcinolone (KENALOG) 0.025 % cream Apply to affected area BID PRN flares 30 g 2    aspirin 325 MG tablet Take 325 mg by mouth daily      ibuprofen (ADVIL;MOTRIN) 200 MG tablet Take 200 mg by mouth every 6 hours as needed for Pain      loratadine (CLARITIN) 10 MG tablet Take 10 mg by mouth daily.  Multiple Vitamins-Minerals (CENTRUM CARDIO) TABS Take  by mouth daily.  psyllium (METAMUCIL) 0.52 G capsule Take by mouth 2 times daily       calcium carbonate (OSCAL) 500 MG TABS tablet Take 1,200 mg by mouth daily          Allergies   Allergen Reactions    Ace Inhibitors      Lip swelling    Lisinopril Hives    Penicillins Hives    Bacitracin Rash    Morphine Nausea And Vomiting     Violently ill       Social History     Tobacco Use    Smoking status: Never Smoker    Smokeless tobacco: Never Used   Substance Use Topics    Alcohol use: Yes     Alcohol/week: 0.0 standard drinks     Comment: ONE DRINK PER WEEK       /74 (Site: Right Upper Arm, Position: Sitting, Cuff Size: Medium Adult)   Pulse 63   Temp 97.7 °F (36.5 °C) (Infrared)   Resp 12   Wt 146 lb 8 oz (66.5 kg)   SpO2 98%   BMI 25.96 kg/m²     Objective:   Physical Exam  Constitutional:       General: She is not in acute distress. Appearance: She is well-developed. Neck:      Vascular: No carotid bruit.    Cardiovascular: Rate and Rhythm: Normal rate and regular rhythm. Pulses:           Dorsalis pedis pulses are 2+ on the right side and 2+ on the left side. Posterior tibial pulses are 2+ on the right side and 2+ on the left side. Heart sounds: Normal heart sounds. No murmur. No friction rub. No gallop. Pulmonary:      Effort: Pulmonary effort is normal.      Breath sounds: Normal breath sounds. Musculoskeletal:         General: No tenderness. Right knee: She exhibits decreased range of motion, swelling and deformity (Enlargement with angulation of the joint and crepitus). She exhibits no LCL laxity, normal patellar mobility and no MCL laxity. No tenderness found. Left knee: Normal.      Right lower leg: No edema. Left lower leg: No edema. Right foot: Normal.      Left foot: Normal.   Neurological:      Mental Status: She is alert. Sensory: Sensation is intact. No sensory deficit. Motor: Motor function is intact. Deep Tendon Reflexes: Reflexes are normal and symmetric. Comments: 12 point monofilament test normal    Psychiatric:         Behavior: Behavior is cooperative. Assessment:      Gera Parker was seen today for follow-up and hypertension. Diagnoses and all orders for this visit:    Hyperglycemia    Hypercholesteremia  -     atorvastatin (LIPITOR) 20 MG tablet; Take 1 tablet by mouth daily    Cancer of ascending colon (HCC)    Gastroesophageal reflux disease without esophagitis    Primary osteoarthritis of right knee    Hypokalemia    Well adult exam  -     Comprehensive Metabolic Panel - [facility]; Future  -     Hemoglobin A1C; Future  -     Lipid Panel; Future  -     FREE T4; Future  -     TSH without Reflex; Future    Other orders  -     potassium chloride (KLOR-CON M) 20 MEQ TBCR extended release tablet; Take 1 tablet by mouth daily  -     triamterene-hydroCHLOROthiazide (DYAZIDE) 37.5-25 MG per capsule;  Take 1 capsule by mouth daily            Plan:      Pt

## 2020-08-10 RX ORDER — ATORVASTATIN CALCIUM 20 MG/1
TABLET, FILM COATED ORAL
Qty: 90 TABLET | Refills: 1 | Status: SHIPPED | OUTPATIENT
Start: 2020-08-10 | End: 2021-01-19 | Stop reason: SDUPTHER

## 2020-08-17 ENCOUNTER — HOSPITAL ENCOUNTER (OUTPATIENT)
Dept: WOMENS IMAGING | Age: 60
Discharge: HOME | End: 2020-08-17
Payer: COMMERCIAL

## 2020-08-17 PROCEDURE — 77063 BREAST TOMOSYNTHESIS BI: CPT

## 2020-09-02 ENCOUNTER — OFFICE VISIT (OUTPATIENT)
Dept: ORTHOPEDIC SURGERY | Age: 60
End: 2020-09-02
Payer: COMMERCIAL

## 2020-09-02 PROCEDURE — 20610 DRAIN/INJ JOINT/BURSA W/O US: CPT | Performed by: ORTHOPAEDIC SURGERY

## 2020-09-02 PROCEDURE — 99213 OFFICE O/P EST LOW 20 MIN: CPT | Performed by: ORTHOPAEDIC SURGERY

## 2020-09-02 RX ORDER — METHYLPREDNISOLONE ACETATE 40 MG/ML
80 INJECTION, SUSPENSION INTRA-ARTICULAR; INTRALESIONAL; INTRAMUSCULAR; SOFT TISSUE ONCE
Status: COMPLETED | OUTPATIENT
Start: 2020-09-02 | End: 2020-09-02

## 2020-09-02 RX ADMIN — METHYLPREDNISOLONE ACETATE 80 MG: 40 INJECTION, SUSPENSION INTRA-ARTICULAR; INTRALESIONAL; INTRAMUSCULAR; SOFT TISSUE at 09:32

## 2020-09-02 NOTE — PROGRESS NOTES
Chief Complaint    Follow-up (right knee)      History of Present Illness:  Julius Barreto is a 63 y.o. female. She is here today for follow-up for her right knee. She does have known history of right knee osteoarthritis. We have treated this with injections in the past.  She is here today because she would like to get another cortisone injection into the knee. Medical History:  Patient's medications, allergies, past medical, surgical, social and family histories were reviewed and updated as appropriate. Review of Systems:  Pertinent items are noted in HPI  Review of systems reviewed from Patient History Form dated on 9/2/20 and available in the patient's chart under the Media tab. Vital Signs: There were no vitals taken for this visit. General Exam:   Constitutional: Patient is adequately groomed with no evidence of malnutrition  DTRs: Deep tendon reflexes are intact  Mental Status: The patient is oriented to time, place and person. The patient's mood and affect are appropriate. Knee Examination:    Inspection: She does have an overall valgus alignment.  Well-healed arthroscopic portals.     Palpation: There is tenderness palpation primarily along the lateral joint line.  No significant palpable effusion.     Range of Motion: Extension is 0 degrees, knee flexion is 120 degrees     Strength: She is able to do straight leg raise     Special Tests: No instability to varus and valgus stress testing.  Negative posterior drawer.  Negative Apley Barbara     Skin: There are no rashes, ulcerations or lesions.     Gait: Antalgic    Additional Comments:       Additional Examinations:         Left Lower Extremity: Examination of the left lower extremity does not show any tenderness, deformity or injury. Range of motion is unremarkable. There is no gross instability. There are no rashes, ulcerations or lesions.   Strength and tone are normal.             Assessment : Right knee osteoarthritis    Impression:  Encounter Diagnosis   Name Primary?  Primary osteoarthritis of right knee Yes       Office Procedures:  No orders of the defined types were placed in this encounter. Treatment Plan: We discussed treatment options today for her right knee. She would like to get a cortisone injection today into the right knee. We will proceed with that today. She has the option of these injections every 3 to 4 months as needed. We have also discussed the option of total knee arthroplasty of which she does want to consider in the future    Under sterile conditions the right knee was injected through superior LAT approach with 2 cc of 40 mg Depo-Medrol mixed with 3 cc of quarter percent Marcaine. She did tolerate the injection well.   Postinjection precautions were given

## 2020-09-09 ENCOUNTER — OFFICE VISIT (OUTPATIENT)
Dept: DERMATOLOGY | Age: 60
End: 2020-09-09
Payer: COMMERCIAL

## 2020-09-09 VITALS — TEMPERATURE: 98.2 F

## 2020-09-09 PROCEDURE — 99214 OFFICE O/P EST MOD 30 MIN: CPT | Performed by: DERMATOLOGY

## 2020-09-09 PROCEDURE — 17000 DESTRUCT PREMALG LESION: CPT | Performed by: DERMATOLOGY

## 2020-09-09 NOTE — PROGRESS NOTES
[facility]) Dermatology  Alex Oliva M.D.  [phone]       Hans De La Garza  1960    63 y.o. female     Date of Visit: 9/9/2020    Chief Complaint:   Chief Complaint   Patient presents with    Skin Lesion        I was asked to see this patient by Dr. Venegas ref. provider found. History of Present Illness:  1. Total-body skin exam    Healed well after Mohs-notes postoperative bleeding-sounds like postoperative hematoma that spontaneously drained. Has healed well since then. New dry papule vertex of scalp-dry but not itching, bleeding. Asymptomatic. Raised scaly papule left temple-dry but asymptomatic. Not itching, bleeding    Multiple nevi. Stable in size, shape, color. Has not noticed any new or changing pigmented lesions    Watches 6 grandchildren every day during the summer     Skin history:  History of basal cell carcinoma left chest and right forearm      12/16 Cutaneous sarcoid with no evidence of systemic involvement. Developed at site of IV. Tissue cultures negative. 5/17 superficial basal cell carcinoma central chest status post curettage  3/20 superficial basal cell carcinoma left medial lower leg  3/20 superficial basal cell carcinoma right medial lower leg  3/20 superficial basal cell carcinoma glabella status post Mohs 6/1/2020 (COVID)  5/20 left thenar forearm superficial basal cell carcinoma status post curettage      Seborrheic dermatitis-triamcinolone 0.025% cream or Elidel 1% cream     Family history positive for nonmelanoma skin cancer in both parents       Review of Systems:  Constitutional: Reports general sense of well-being   Skin-no new rashes    Past Medical History, Surgical History, Family History, Medications and Allergies reviewed.     Social History:   Social History     Socioeconomic History    Marital status:      Spouse name: Not on file    Number of children: Not on file    Years of education: Not on file    Highest education level: Not on file Occupational History    Not on file   Social Needs    Financial resource strain: Not on file    Food insecurity     Worry: Not on file     Inability: Not on file    Transportation needs     Medical: Not on file     Non-medical: Not on file   Tobacco Use    Smoking status: Never Smoker    Smokeless tobacco: Never Used   Substance and Sexual Activity    Alcohol use: Yes     Alcohol/week: 0.0 standard drinks     Comment: ONE DRINK PER WEEK    Drug use: No    Sexual activity: Not on file   Lifestyle    Physical activity     Days per week: Not on file     Minutes per session: Not on file    Stress: Not on file   Relationships    Social connections     Talks on phone: Not on file     Gets together: Not on file     Attends Confucianism service: Not on file     Active member of club or organization: Not on file     Attends meetings of clubs or organizations: Not on file     Relationship status: Not on file    Intimate partner violence     Fear of current or ex partner: Not on file     Emotionally abused: Not on file     Physically abused: Not on file     Forced sexual activity: Not on file   Other Topics Concern    Not on file   Social History Narrative    Not on file       Physical Examination       -General: Well-appearing, NAD  1. Normal affect. Total body skin exam including scalp, face, neck, chest, abdomen, back, bilateral upper extremities, bilateral lower extremities, ocular conjunctiva, external lips, and nails was performed. Examination normal unless stated below. Underwear area not examined. Scattered on the trunk and extremities are multiple well-defined round and oval symmetric smoothly-bordered uniformly brown macules and papules.   Gritty erythematous papule mid anterior vertex of scalp-actinic keratosis  Left temple and lower back multiple hyperkeratotic stuck on papules-benign seborrheic keratoses  Well-healed scars at sites of prior basal cell carcinomas no sign of recurrence    Vertex of scalp-shiny white scar-not hairbearing. Assessment and Plan     1. Keratosis, actinic -1-at anterior vertex of scalp lesion(s) treated w/ liquid nitrogen. Edu re: risk of blister formation, discomfort, scar, hypopigmentation. Discussed wound care. 2. Benign nevus - Benign acquired melanocytic nevi  -Recommend monthly self skin exams   -Educated regarding the ABCDEs of melanoma detection   -Call for any new/changing moles or concerning lesions  -Reviewed sun protective behavior -- sun avoidance during the peak hours of the day, sun-protective clothing (including hat and sunglasses), sunscreen use (water resistant, broad spectrum, SPF at least 30, need for reapplication every 2 to 3 hours), avoidance of tanning beds        3. Seborrheic keratoses-monitor for change   4. History of basal cell cancer - No evidence of recurrence. Discussed risk of subsequent skin cancers and increased risk of melanoma. Reviewed importance of monitoring skin for change and sun protection with hats and sunscreen, as well as sun avoidance. Discussed scar and vertex of scalp with patient-multiple surgeries on scalp, patient does not remember surgery in that area. Does not remember clear history of injury. Features not consistent with basal cell carcinoma, but given history of multiple basal cells, monitor for change.

## 2020-11-03 RX ORDER — OLOPATADINE HYDROCHLORIDE 7 MG/ML
SOLUTION OPHTHALMIC
Qty: 1 BOTTLE | Refills: 2 | Status: SHIPPED
Start: 2020-11-03 | End: 2021-02-09

## 2020-12-02 ENCOUNTER — OFFICE VISIT (OUTPATIENT)
Dept: ORTHOPEDIC SURGERY | Age: 60
End: 2020-12-02
Payer: COMMERCIAL

## 2020-12-02 VITALS — HEIGHT: 62 IN | TEMPERATURE: 97.8 F | BODY MASS INDEX: 26.87 KG/M2 | WEIGHT: 146 LBS

## 2020-12-02 PROCEDURE — 20610 DRAIN/INJ JOINT/BURSA W/O US: CPT | Performed by: ORTHOPAEDIC SURGERY

## 2020-12-02 PROCEDURE — 99213 OFFICE O/P EST LOW 20 MIN: CPT | Performed by: ORTHOPAEDIC SURGERY

## 2020-12-02 RX ORDER — METHYLPREDNISOLONE ACETATE 40 MG/ML
80 INJECTION, SUSPENSION INTRA-ARTICULAR; INTRALESIONAL; INTRAMUSCULAR; SOFT TISSUE ONCE
Status: COMPLETED | OUTPATIENT
Start: 2020-12-02 | End: 2020-12-02

## 2020-12-02 RX ADMIN — METHYLPREDNISOLONE ACETATE 80 MG: 40 INJECTION, SUSPENSION INTRA-ARTICULAR; INTRALESIONAL; INTRAMUSCULAR; SOFT TISSUE at 08:33

## 2020-12-02 NOTE — PROGRESS NOTES
Chief Complaint    Knee Problem (right knee )      History of Present Illness:  Rufino Potter is a 63 y.o. female. She is here today for follow-up for her right knee. She does have a known history of right knee osteoarthritis. She is here today to get another cortisone injection. We have treated this in the past with cortisone injections. She does feel like this does help a lot. Denies any new injury to the knee           Medical History:  Patient's medications, allergies, past medical, surgical, social and family histories were reviewed and updated as appropriate. Review of Systems:  Pertinent items are noted in HPI  Review of systems reviewed from Patient History Form dated on 12/2/20 and available in the patient's chart under the Media tab. Vital Signs:  Temp 97.8 °F (36.6 °C)   Ht 5' 2\" (1.575 m)   Wt 146 lb (66.2 kg)   BMI 26.70 kg/m²     General Exam:   Constitutional: Patient is adequately groomed with no evidence of malnutrition  DTRs: Deep tendon reflexes are intact  Mental Status: The patient is oriented to time, place and person. The patient's mood and affect are appropriate. Knee Examination:    Inspection: She does have an overall valgus alignment.  Well-healed arthroscopic portals.     Palpation: There is tenderness palpation primarily along the lateral joint line.  No significant palpable effusion.     Range of Motion: Extension is 0 degrees, knee flexion is 120 degrees     Strength: She is able to do straight leg raise     Special Tests: No instability to varus and valgus stress testing.  Negative posterior drawer.  Negative Apley Barbara     Skin: There are no rashes, ulcerations or lesions.     Gait: Antalgic    Additional Comments:       Additional Examinations:         Left Lower Extremity: Examination of the left lower extremity does not show any tenderness, deformity or injury. Range of motion is unremarkable. There is no gross instability.   There are no rashes, ulcerations or lesions. Strength and tone are normal.             Assessment : Right knee osteoarthritis    Impression:  Encounter Diagnosis   Name Primary?  Primary osteoarthritis of right knee Yes       Office Procedures:  Orders Placed This Encounter   Procedures    21524 - [place] DRAIN/INJECT LARGE JOINT/BURSA       Treatment Plan: I discussed the diagnosis and treatment options with her today. She would like to proceed with cortisone injection today into the right knee. She has the option of these injections every 3 to 4 months as needed    Under sterile conditions the right knee was injected through superior LAT approach with 2 cc of 40 mg Depo-Medrol mixed with 3 cc of quarter percent Marcaine. She did tolerate the injection well.   Postinjection precautions were given

## 2021-01-19 ENCOUNTER — OFFICE VISIT (OUTPATIENT)
Dept: FAMILY MEDICINE CLINIC | Age: 61
End: 2021-01-19
Payer: COMMERCIAL

## 2021-01-19 VITALS
HEIGHT: 63 IN | TEMPERATURE: 97.1 F | SYSTOLIC BLOOD PRESSURE: 110 MMHG | BODY MASS INDEX: 27.25 KG/M2 | OXYGEN SATURATION: 98 % | WEIGHT: 153.8 LBS | DIASTOLIC BLOOD PRESSURE: 80 MMHG | HEART RATE: 83 BPM

## 2021-01-19 DIAGNOSIS — C18.2 CANCER OF ASCENDING COLON (HCC): ICD-10-CM

## 2021-01-19 DIAGNOSIS — Z00.00 WELL ADULT EXAM: Primary | ICD-10-CM

## 2021-01-19 DIAGNOSIS — M17.11 PRIMARY OSTEOARTHRITIS OF RIGHT KNEE: ICD-10-CM

## 2021-01-19 DIAGNOSIS — I10 ESSENTIAL HYPERTENSION: ICD-10-CM

## 2021-01-19 DIAGNOSIS — E78.00 HYPERCHOLESTEREMIA: ICD-10-CM

## 2021-01-19 DIAGNOSIS — K51.00 ULCERATIVE PANCOLITIS WITHOUT COMPLICATION (HCC): ICD-10-CM

## 2021-01-19 DIAGNOSIS — R73.9 HYPERGLYCEMIA: ICD-10-CM

## 2021-01-19 PROCEDURE — 99396 PREV VISIT EST AGE 40-64: CPT | Performed by: FAMILY MEDICINE

## 2021-01-19 RX ORDER — TRIAMTERENE AND HYDROCHLOROTHIAZIDE 37.5; 25 MG/1; MG/1
1 CAPSULE ORAL DAILY
Qty: 90 CAPSULE | Refills: 3 | Status: SHIPPED | OUTPATIENT
Start: 2021-01-19 | End: 2022-02-01

## 2021-01-19 RX ORDER — POTASSIUM CHLORIDE 1500 MG/1
20 TABLET, FILM COATED, EXTENDED RELEASE ORAL DAILY
Qty: 90 TABLET | Refills: 3 | Status: SHIPPED | OUTPATIENT
Start: 2021-01-19 | End: 2021-08-10 | Stop reason: SDUPTHER

## 2021-01-19 RX ORDER — ATORVASTATIN CALCIUM 20 MG/1
TABLET, FILM COATED ORAL
Qty: 90 TABLET | Refills: 3 | Status: SHIPPED | OUTPATIENT
Start: 2021-01-19 | End: 2022-01-27

## 2021-01-19 RX ORDER — ALBUTEROL SULFATE 90 UG/1
2 AEROSOL, METERED RESPIRATORY (INHALATION) EVERY 6 HOURS PRN
Qty: 3 INHALER | Refills: 1 | Status: CANCELLED | OUTPATIENT
Start: 2021-01-19

## 2021-01-19 ASSESSMENT — PATIENT HEALTH QUESTIONNAIRE - PHQ9
2. FEELING DOWN, DEPRESSED OR HOPELESS: 0
SUM OF ALL RESPONSES TO PHQ9 QUESTIONS 1 & 2: 0
1. LITTLE INTEREST OR PLEASURE IN DOING THINGS: 0
SUM OF ALL RESPONSES TO PHQ QUESTIONS 1-9: 0

## 2021-01-19 NOTE — PROGRESS NOTES
Medication Sig Dispense Refill    Olopatadine HCl (PAZEO) 0.7 % SOLN INSTILL ONE DROP TO THE AFFECTED EYE(S) DAILY AS NEEDED 1 Bottle 2    atorvastatin (LIPITOR) 20 MG tablet TAKE 1 TABLET BY MOUTH ONE TIME A DAY  90 tablet 1    potassium chloride (KLOR-CON M) 20 MEQ TBCR extended release tablet Take 1 tablet by mouth daily 90 tablet 3    triamterene-hydroCHLOROthiazide (DYAZIDE) 37.5-25 MG per capsule Take 1 capsule by mouth daily 90 capsule 1    mupirocin (BACTROBAN) 2 % ointment Ply daily under Band-Aid 22 g 0    famotidine (PEPCID) 20 MG tablet Take 20 mg by mouth 2 times daily      albuterol sulfate HFA (PROAIR HFA) 108 (90 Base) MCG/ACT inhaler Inhale 2 puffs into the lungs every 6 hours as needed for Wheezing 3 Inhaler 1    triamcinolone (KENALOG) 0.025 % cream Apply to affected area BID PRN flares 30 g 2    aspirin 325 MG tablet Take 325 mg by mouth daily      ibuprofen (ADVIL;MOTRIN) 200 MG tablet Take 200 mg by mouth every 6 hours as needed for Pain      loratadine (CLARITIN) 10 MG tablet Take 10 mg by mouth daily.  Multiple Vitamins-Minerals (CENTRUM CARDIO) TABS Take  by mouth daily.       psyllium (METAMUCIL) 0.52 G capsule Take by mouth 2 times daily       calcium carbonate (OSCAL) 500 MG TABS tablet Take 1,200 mg by mouth daily          Past Medical History:   Diagnosis Date    Allergic     Anemia     Arthritis     Asthma     Cancer ([facility] [address]     colon    Colon cancer (HCC)     GERD (gastroesophageal reflux disease)     Hearing loss     deaf LEFT ear    Hyperlipidemia     Hypertension     IBS (irritable bowel syndrome)     Migraine     PONV (postoperative nausea and vomiting)      Past Surgical History:   Procedure Laterality Date     SECTION      x2    CHOLECYSTECTOMY      HYSTERECTOMY      total, with ovaries    KNEE ARTHROSCOPY Right 3/15/2019 RIGHT KNEE ARTHROSCOPE, PARTIAL MEDIAL AND LATERAL MENISCECTOMY, SYNOVECTOMY, CHONDROPLASTY performed by Kedar Mckinney DO at [address]      multiple    MOHS SURGERY  06/01/2020    Right glabella    OTHER SURGICAL HISTORY      basal cell carcinoma, RT arm    RIGHT COLECTOMY  3/16/16    laparoscopic     SEPTOPLASTY       Family History   Problem Relation Age of Onset    High Blood Pressure Mother     Heart Disease Mother     Coronary Art Dis Mother     Diabetes Mother     Hypertension Mother     High Cholesterol Mother     Diabetes Father     Migraines Sister     Cancer Maternal Grandmother      Social History     Socioeconomic History    Marital status:      Spouse name: Not on file    Number of children: Not on file    Years of education: Not on file    Highest education level: Not on file   Occupational History    Not on file   Social Needs    Financial resource strain: Not on file    Food insecurity     Worry: Not on file     Inability: Not on file    Transportation needs     Medical: Not on file     Non-medical: Not on file   Tobacco Use    Smoking status: Never Smoker    Smokeless tobacco: Never Used   Substance and Sexual Activity    Alcohol use:  Yes     Alcohol/week: 0.0 standard drinks     Comment: ONE DRINK PER WEEK    Drug use: No    Sexual activity: Not on file   Lifestyle    Physical activity     Days per week: Not on file     Minutes per session: Not on file    Stress: Not on file   Relationships    Social connections     Talks on phone: Not on file     Gets together: Not on file     Attends Rastafarian service: Not on file     Active member of club or organization: Not on file     Attends meetings of clubs or organizations: Not on file     Relationship status: Not on file    Intimate partner violence     Fear of current or ex partner: Not on file     Emotionally abused: Not on file     Physically abused: Not on file Neurological: She is alert and oriented to person, place, and time. She has normal reflexes. No cranial nerve deficit. Coordination normal.   Skin: Skin is warm and dry. There is no rash or erythema. No suspicious lesions noted. Psychiatric: She has a normal mood and affect. Her speech is normal and behavior is normal. Judgment, cognition and memory are normal.     Preventive Care:  Health Maintenance   Topic Date Due    Hepatitis C screen  1960    HIV screen  07/22/1974    Cervical cancer screen  02/04/2019    Colon cancer screen colonoscopy  03/09/2021    Lipid screen  01/14/2022    Potassium monitoring  01/14/2022    Creatinine monitoring  01/14/2022    Breast cancer screen  08/17/2022    DTaP/Tdap/Td vaccine (3 - Td) 11/05/2024    Flu vaccine  Completed    Shingles Vaccine  Completed    Hepatitis A vaccine  Aged Out    Hepatitis B vaccine  Aged Out    Hib vaccine  Aged Out    Meningococcal (ACWY) vaccine  Aged Out    Pneumococcal 0-64 years Vaccine  Aged Out           Preventive plan of care for Enid Quick        1/19/2021           Preventive Measures Status       Recommendations for screening   Colon Cancer Screen   Last colonoscopy: 2018 Screening not needed due to existing diagnosis   Breast Cancer Screen  Last mammogram: 2020  Repeat yearly   Cervical Cancer Screen   Last PAP smear: no This test is not clinically indicated   Osteoporosis Screen   Last DXA scan: no This test is not clinically indicated   Diabetes Screen  Glucose   Date Value   01/14/2021 93 mg/dL   06/15/2017 84 MG/DL    Screening not needed due to existing diagnosis   Cholesterol Screen  Lab Results   Component Value Date    CHOL 137 01/14/2021    TRIG 173 (H) 01/14/2021    HDL 50 01/14/2021    [address] 01/14/2021    Screening not needed due to existing diagnosis   Aspirin for Cardiovascular Prevention   Yes Continue daily aspirin   Weight: Body mass index is 27.24 kg/m². 5' 3\" (1.6 m)153 lb 12.8 oz (69.8 kg)    Your BMI is 25 or greater, which indicates that you are overweight   Living Will: No   Additional information provided    Recommended Immunizations    Immunization History   Administered Date(s) Administered    DT (pediatric) 06/20/2005    Influenza A ([id number]) Vaccine PF IM 10/23/2009    Influenza Vaccine, unspecified formulation 10/17/2016    Influenza Virus Vaccine 09/25/2012, 10/05/2015, 10/13/2017, 09/06/2018    Influenza Whole 09/25/2012    Influenza, Intradermal, Preservative free 10/02/2013, 10/16/2014, 10/05/2015    Influenza, Quadv, IM, PF (6 mo and older Fluzone, Flulaval, Fluarix, and 3 yrs and older Afluria) 08/22/2020    Influenza, [place], Recombinant, IM PF (Flublok 18 yrs and older) 10/15/2019    Tdap (Boostrix, Adacel) 11/05/2014    Zoster Recombinant (Shingrix) 10/15/2019, 01/14/2020        Influenza vaccine:  recommended every fall         Other Recommendations ·   Try to get at least 30 minutes of exercise 3-4 days per week                 Assessment/Plan:    Demar Chapman was seen today for annual exam.    Diagnoses and all orders for this visit:    Well adult exam    Hypercholesteremia  -     atorvastatin (LIPITOR) 20 MG tablet; TAKE 1 TABLET BY MOUTH ONE TIME A DAY    Primary osteoarthritis of right knee    Ulcerative pancolitis without complication ([address]    Cancer of ascending colon ([address]    Essential hypertension    Hyperglycemia  -     Basic Metabolic Panel; Future  -     Hemoglobin A1C; Future    Other orders  -     triamterene-hydroCHLOROthiazide (DYAZIDE) 37.5-25 MG per capsule; Take 1 capsule by mouth daily  -     potassium chloride (KLOR-CON M) 20 MEQ TBCR extended release tablet; Take 1 tablet by mouth daily        Pt appears stable & reviewed labs with patient. Maintain current medications    Obviously encourage patient to continue with oncology care and to have a colonoscopy done in March. Continue with orthopedic care and discussed surgical intervention when she is ready. Patient received counseling on the following healthy behaviors: nutrition and exercise     Patient given educational materials     Health maintenance updated    Discussed use, benefit, and side effects of prescribed medications. Barriers to medication compliance addressed. All patient questions answered. Pt voiced understanding. Patient needs RTC in 6 months. Medical decision making of moderate complexity. Please note that this chart was generated using Dragon dictation software. Although every effort was made to ensure the accuracy of this automated transcription, some errors in transcription may have occurred. Patient was evaluated and examined. I performed the physical examination and key/critical portions of the history were approved and edited.  I also confirm that the note above accurately reflects all work, treatment, procedures, and medical decision making performed by me, Paul Jones M.D.

## 2021-02-01 ENCOUNTER — TELEPHONE (OUTPATIENT)
Dept: FAMILY MEDICINE CLINIC | Age: 61
End: 2021-02-01

## 2021-02-01 NOTE — TELEPHONE ENCOUNTER
Olopatadine HCl (PAZEO) 0.7 % SOLN 1 Bottle 2 11/3/2020     Sig: INSTILL ONE DROP TO THE AFFECTED EYE(S) DAILY AS NEEDED      Patient wants to know if she can get a PA for her eye drops. Its a new year and they are requesting it. Pharmacy states they sent something over requesting the PA.     [facility] in chart

## 2021-02-08 ENCOUNTER — TELEPHONE (OUTPATIENT)
Dept: FAMILY MEDICINE CLINIC | Age: 61
End: 2021-02-08

## 2021-02-08 NOTE — TELEPHONE ENCOUNTER
Patient is calling back for an update on her PA for the eye drops. She has not heard back from anyone yet, neither has the pharmacy.       Please advise

## 2021-02-09 RX ORDER — AZELASTINE HYDROCHLORIDE 0.5 MG/ML
1 SOLUTION/ DROPS OPHTHALMIC 2 TIMES DAILY
Qty: 1 BOTTLE | Refills: 5 | Status: SHIPPED | OUTPATIENT
Start: 2021-02-09 | End: 2021-07-20

## 2021-03-10 ENCOUNTER — OFFICE VISIT (OUTPATIENT)
Dept: DERMATOLOGY | Age: 61
End: 2021-03-10
Payer: COMMERCIAL

## 2021-03-10 VITALS — TEMPERATURE: 97.6 F

## 2021-03-10 DIAGNOSIS — L57.0 KERATOSIS, ACTINIC: Primary | ICD-10-CM

## 2021-03-10 DIAGNOSIS — D22.9 BENIGN NEVUS: ICD-10-CM

## 2021-03-10 DIAGNOSIS — Z85.828 HISTORY OF BASAL CELL CANCER: ICD-10-CM

## 2021-03-10 DIAGNOSIS — L24.9 IRRITANT DERMATITIS: ICD-10-CM

## 2021-03-10 DIAGNOSIS — L82.1 SEBORRHEIC KERATOSES: ICD-10-CM

## 2021-03-10 PROCEDURE — 99213 OFFICE O/P EST LOW 20 MIN: CPT | Performed by: DERMATOLOGY

## 2021-03-10 PROCEDURE — 17003 DESTRUCT PREMALG LES 2-14: CPT | Performed by: DERMATOLOGY

## 2021-03-10 PROCEDURE — 17000 DESTRUCT PREMALG LESION: CPT | Performed by: DERMATOLOGY

## 2021-03-10 NOTE — PROGRESS NOTES
[facility]) Dermatology  Sarah Pearson M.D.  [phone]       Marissa Perez  1960    63 y.o. female     Date of Visit: 3/10/2021    Chief Complaint:   Chief Complaint   Patient presents with    Skin Lesion        I was asked to see this patient by Dr. Venegas ref. provider found. History of Present Illness:  1. Total-body skin exam    Multiple nevi-stable in size, shape, color. Has not noticed any new or changing pigmented lesions. Does monitor her skin for change    Seborrheic keratoses torso-have increased in size and number but are asymptomatic-not itching, bleeding    Irritant dermatitis bilateral hands-worse over the winter and worse when she is caring for her grandchildren and parents. Washes her hands constantly. Is developing fissuring. Watches 6 grandchildren every day during the summer     Skin history:  History of basal cell carcinoma left chest and right forearm      12/16 Cutaneous sarcoid with no evidence of systemic involvement. Developed at site of IV. Tissue cultures negative. 5/17 superficial basal cell carcinoma central chest status post curettage  3/20 superficial basal cell carcinoma left medial lower leg  3/20 superficial basal cell carcinoma right medial lower leg  3/20 superficial basal cell carcinoma glabella status post Mohs 6/1/2020 (COVID)  5/20 left thenar forearm superficial basal cell carcinoma status post curettage      Seborrheic dermatitis-triamcinolone 0.025% cream or Elidel 1% cream     Family history positive for nonmelanoma skin cancer in both parents       Review of Systems:  Constitutional: Reports general sense of well-being       Past Medical History, Surgical History, Family History, Medications and Allergies reviewed.     Social History:   Social History     Socioeconomic History    Marital status:      Spouse name: Not on file    Number of children: Not on file    Years of education: Not on file    Highest education level: Not on file Occupational History    Not on file   Social Needs    Financial resource strain: Not on file    Food insecurity     Worry: Not on file     Inability: Not on file    Transportation needs     Medical: Not on file     Non-medical: Not on file   Tobacco Use    Smoking status: Never Smoker    Smokeless tobacco: Never Used   Substance and Sexual Activity    Alcohol use: Yes     Alcohol/week: 0.0 standard drinks     Comment: ONE DRINK PER WEEK    Drug use: No    Sexual activity: Not on file   Lifestyle    Physical activity     Days per week: Not on file     Minutes per session: Not on file    Stress: Not on file   Relationships    Social connections     Talks on phone: Not on file     Gets together: Not on file     Attends Anglican service: Not on file     Active member of club or organization: Not on file     Attends meetings of clubs or organizations: Not on file     Relationship status: Not on file    Intimate partner violence     Fear of current or ex partner: Not on file     Emotionally abused: Not on file     Physically abused: Not on file     Forced sexual activity: Not on file   Other Topics Concern    Not on file   Social History Narrative    Not on file       Physical Examination       -General: Well-appearing, NAD  1. Normal affect. Total body skin exam including scalp, face, neck, chest, abdomen, back, bilateral upper extremities, bilateral lower extremities, ocular conjunctiva, external lips, and nails was performed. Examination normal unless stated below. Underwear area not examined. Scattered on the trunk and extremities are multiple well-defined round and oval symmetric smoothly-bordered uniformly brown macules and papules. Multiple hyperkeratotic stuck on papules torso  Erythema with scale dorsal hands. Fissuring distal fingertips  Multiple well-healed scars no sign of recurrence  Gritty erythematous papules x4-left cheek, right forehead    Assessment and Plan     1.  Keratosis, actinic -4- face lesion(s) treated w/ liquid nitrogen. Edu re: risk of blister formation, discomfort, scar, hypopigmentation. Discussed wound care. 2. Seborrheic keratoses- Discussed underlying nature of seborrheic keratosis and low risk of malignancy. Treatment reserved for lesions that are itching, bleeding, growing or otherwise becoming bothersome. Discussed monitoring for change with reevaluation for changing lesions. 3. Benign nevus - Benign acquired melanocytic nevi  -Recommend monthly self skin exams   -Educated regarding the ABCDEs of melanoma detection   -Call for any new/changing moles or concerning lesions  -Reviewed sun protective behavior -- sun avoidance during the peak hours of the day, sun-protective clothing (including hat and sunglasses), sunscreen use (water resistant, broad spectrum, SPF at least 30, need for reapplication every 2 to 3 hours), avoidance of tanning beds      4. Irritant dermatitis-Aquaphor, discussed care for fissures. Discussed Aquaphor at night under occlusion   5. History of basal cell cancer - No evidence of recurrence. Discussed risk of subsequent skin cancers and increased risk of melanoma. Reviewed importance of monitoring skin for change and sun protection with hats and sunscreen, as well as sun avoidance.

## 2021-03-11 ENCOUNTER — HOSPITAL ENCOUNTER (OUTPATIENT)
Dept: ULTRASOUND IMAGING | Age: 61
Discharge: HOME | End: 2021-03-11
Payer: COMMERCIAL

## 2021-03-11 ENCOUNTER — HOSPITAL ENCOUNTER (OUTPATIENT)
Dept: WOMENS IMAGING | Age: 61
Discharge: HOME | End: 2021-03-11
Payer: COMMERCIAL

## 2021-03-11 DIAGNOSIS — R52 PAIN: ICD-10-CM

## 2021-03-11 DIAGNOSIS — N64.4 BREAST PAIN: ICD-10-CM

## 2021-03-11 PROCEDURE — G0279 TOMOSYNTHESIS, MAMMO: HCPCS

## 2021-03-11 PROCEDURE — 76642 ULTRASOUND BREAST LIMITED: CPT

## 2021-03-12 ENCOUNTER — IMMUNIZATION (OUTPATIENT)
Dept: PRIMARY CARE CLINIC | Age: 61
End: 2021-03-12
Payer: COMMERCIAL

## 2021-03-12 PROCEDURE — 0011A COVID-19, MODERNA VACCINE 100MCG/0.5ML DOSE: CPT | Performed by: FAMILY MEDICINE

## 2021-03-12 PROCEDURE — 91301 COVID-19, MODERNA VACCINE 100MCG/0.5ML DOSE: CPT | Performed by: FAMILY MEDICINE

## 2021-04-09 ENCOUNTER — IMMUNIZATION (OUTPATIENT)
Dept: PRIMARY CARE CLINIC | Age: 61
End: 2021-04-09
Payer: COMMERCIAL

## 2021-04-09 PROCEDURE — 0012A COVID-19, MODERNA VACCINE 100MCG/0.5ML DOSE: CPT | Performed by: FAMILY MEDICINE

## 2021-04-09 PROCEDURE — 91301 COVID-19, MODERNA VACCINE 100MCG/0.5ML DOSE: CPT | Performed by: FAMILY MEDICINE

## 2021-05-13 ENCOUNTER — OFFICE VISIT (OUTPATIENT)
Dept: DERMATOLOGY | Age: 61
End: 2021-05-13
Payer: COMMERCIAL

## 2021-05-13 VITALS — TEMPERATURE: 97.3 F

## 2021-05-13 DIAGNOSIS — L71.9 ROSACEA: Primary | ICD-10-CM

## 2021-05-13 PROCEDURE — 99214 OFFICE O/P EST MOD 30 MIN: CPT | Performed by: DERMATOLOGY

## 2021-05-13 RX ORDER — METRONIDAZOLE 7.5 MG/G
GEL TOPICAL
Qty: 45 G | Refills: 1 | Status: SHIPPED | OUTPATIENT
Start: 2021-05-13

## 2021-05-13 NOTE — PROGRESS NOTES
[facility]) Dermatology  Peggy Barrera M.D.  [phone]       Kameron Coffee  1960    63 y.o. female     Date of Visit: 5/13/2021    Chief Complaint:   Chief Complaint   Patient presents with    Skin Lesion     spot on nose with reoccuring roughness and scaliness        I was asked to see this patient by Dr. Venegas ref. provider found. History of Present Illness:  1. Erythema and inflammatory papules left ala, nasal tip-developed over the last 6 weeks. Patient tried Neosporin with no improvement. Remains dry and irritated. Noticed this worsening after she has been wearing a mask. No previous similar rash. Not itching, occasionally painful. Watches 6 grandchildren every day during the summer     Skin history:  History of basal cell carcinoma left chest and right forearm      12/16 Cutaneous sarcoid with no evidence of systemic involvement. Developed at site of IV. Tissue cultures negative. 5/17 superficial basal cell carcinoma central chest status post curettage  3/20 superficial basal cell carcinoma left medial lower leg  3/20 superficial basal cell carcinoma right medial lower leg  3/20 superficial basal cell carcinoma glabella status post Mohs 6/1/2020 (COVID)  5/20 left thenar forearm superficial basal cell carcinoma status post curettage      Seborrheic dermatitis-triamcinolone 0.025% cream or Elidel 1% cream     Family history positive for nonmelanoma skin cancer in both parents    Review of Systems:  Constitutional: Reports general sense of well-being       Past Medical History, Surgical History, Family History, Medications and Allergies reviewed.     Social History:   Social History     Socioeconomic History    Marital status:      Spouse name: Not on file    Number of children: Not on file    Years of education: Not on file    Highest education level: Not on file   Occupational History    Not on file   Social Needs    Financial resource strain: Not on file    Food insecurity Worry: Not on file     Inability: Not on file    Transportation needs     Medical: Not on file     Non-medical: Not on file   Tobacco Use    Smoking status: Never Smoker    Smokeless tobacco: Never Used   Substance and Sexual Activity    Alcohol use: Yes     Alcohol/week: 0.0 standard drinks     Comment: ONE DRINK PER WEEK    Drug use: No    Sexual activity: Not on file   Lifestyle    Physical activity     Days per week: Not on file     Minutes per session: Not on file    Stress: Not on file   Relationships    Social connections     Talks on phone: Not on file     Gets together: Not on file     Attends Scientology service: Not on file     Active member of club or organization: Not on file     Attends meetings of clubs or organizations: Not on file     Relationship status: Not on file    Intimate partner violence     Fear of current or ex partner: Not on file     Emotionally abused: Not on file     Physically abused: Not on file     Forced sexual activity: Not on file   Other Topics Concern    Not on file   Social History Narrative    Not on file       Physical Examination       -General: Well-appearing, NAD  1. Well-developed well-nourished\  Erythema left nasal tip, left ala with multiple active inflammatory papules-1 resolving pustule      Assessment and Plan     1. Rosacea-start MetroGel 0.75% twice daily-reviewed expected time course to improvement, proper use and side effects. Discussed episodic versus ongoing treatment for suppression based on recurrence. Reevaluate at next total-body skin exam or sooner if needed.

## 2021-06-16 ENCOUNTER — HOSPITAL ENCOUNTER (OUTPATIENT)
Dept: CT IMAGING | Age: 61
Discharge: HOME | End: 2021-06-16
Payer: COMMERCIAL

## 2021-06-16 DIAGNOSIS — C18.2 CANCER OF ASCENDING COLON (HCC): ICD-10-CM

## 2021-06-16 LAB
ANION GAP SERPL CALCULATED.3IONS-SCNC: 9 MMOL/L (ref 3–16)
BUN BLDV-MCNC: 11 MG/DL (ref 7–20)
CALCIUM SERPL-MCNC: 9.6 MG/DL (ref 8.3–10.6)
CHLORIDE BLD-SCNC: 105 MMOL/L (ref 99–110)
CO2: 29 MMOL/L (ref 21–32)
CREAT SERPL-MCNC: 0.7 MG/DL (ref 0.6–1.2)
GFR AFRICAN AMERICAN: >60
GFR NON-AFRICAN AMERICAN: >60
GLUCOSE BLD-MCNC: 109 MG/DL (ref 70–99)
POTASSIUM SERPL-SCNC: 3.5 MMOL/L (ref 3.5–5.1)
SODIUM BLD-SCNC: 143 MMOL/L (ref 136–145)

## 2021-06-16 PROCEDURE — 80048 BASIC METABOLIC PNL TOTAL CA: CPT

## 2021-06-16 PROCEDURE — 6360000004 HC RX CONTRAST MEDICATION: Performed by: INTERNAL MEDICINE

## 2021-06-16 PROCEDURE — 74177 CT ABD & PELVIS W/CONTRAST: CPT

## 2021-06-16 PROCEDURE — 36415 COLL VENOUS BLD VENIPUNCTURE: CPT

## 2021-06-16 RX ADMIN — IOPAMIDOL 75 ML: 755 INJECTION, SOLUTION INTRAVENOUS at 07:31

## 2021-06-16 RX ADMIN — IOHEXOL 50 ML: 240 INJECTION, SOLUTION INTRATHECAL; INTRAVASCULAR; INTRAVENOUS; ORAL at 07:31

## 2021-07-20 ENCOUNTER — OFFICE VISIT (OUTPATIENT)
Dept: FAMILY MEDICINE CLINIC | Age: 61
End: 2021-07-20
Payer: COMMERCIAL

## 2021-07-20 VITALS
TEMPERATURE: 97.4 F | SYSTOLIC BLOOD PRESSURE: 104 MMHG | HEART RATE: 72 BPM | BODY MASS INDEX: 26.93 KG/M2 | DIASTOLIC BLOOD PRESSURE: 72 MMHG | WEIGHT: 152 LBS

## 2021-07-20 DIAGNOSIS — K51.00 ULCERATIVE PANCOLITIS WITHOUT COMPLICATION (HCC): ICD-10-CM

## 2021-07-20 DIAGNOSIS — M17.11 PRIMARY OSTEOARTHRITIS OF RIGHT KNEE: ICD-10-CM

## 2021-07-20 DIAGNOSIS — Z00.00 WELL ADULT EXAM: ICD-10-CM

## 2021-07-20 DIAGNOSIS — I10 ESSENTIAL HYPERTENSION: Primary | ICD-10-CM

## 2021-07-20 PROCEDURE — 99214 OFFICE O/P EST MOD 30 MIN: CPT | Performed by: FAMILY MEDICINE

## 2021-07-20 RX ORDER — OLOPATADINE HYDROCHLORIDE 2 MG/ML
1 SOLUTION/ DROPS OPHTHALMIC DAILY
COMMUNITY

## 2021-07-20 ASSESSMENT — PATIENT HEALTH QUESTIONNAIRE - PHQ9
SUM OF ALL RESPONSES TO PHQ QUESTIONS 1-9: 0
SUM OF ALL RESPONSES TO PHQ QUESTIONS 1-9: 0
1. LITTLE INTEREST OR PLEASURE IN DOING THINGS: 0
SUM OF ALL RESPONSES TO PHQ QUESTIONS 1-9: 0
SUM OF ALL RESPONSES TO PHQ9 QUESTIONS 1 & 2: 0
2. FEELING DOWN, DEPRESSED OR HOPELESS: 0

## 2021-07-20 NOTE — PROGRESS NOTES
Subjective:      Patient ID: Drake Evangelista is a 63 y.o. female. CC: Patient presents for re-evaluation of chronic health problems including hypertension, osteoarthritis right knee and ulcerative colitis. [facility] Patient presents today for a follow-up on chronic medications and medical conditions. Patient has been evaluated by ENT specialist several times since last office appointment for recurrent right ear problems. He feels she feels she is doing well at this time. She also had a colonoscopy which demonstrated an ulcer lesion at the ileocecal area. No additional treatment was performed. She also just had a recent CT scan which demonstrated some abnormality in the transverse colon but she does had a recent colonoscopy within the last 6 months and had 1 small polyp removed in that area. She is not wanting to proceed with any head and knee surgical intervention at this time. She does have cortisone injections from the orthopedic specialist for the right knee. Review of Systems     Patient Active Problem List   Diagnosis    Ulcerative colitis ([facility]    Essential hypertension    Goiter    Irritable bowel syndrome    Meniere's disease    Hypercholesteremia    Cancer of ascending colon ([facility]    Gastroesophageal reflux disease without esophagitis    Cutaneous sarcoidosis (HCC)    Complex tear of medial meniscus of right knee as current injury    Complex tear of lateral meniscus of right knee as current injury    Primary osteoarthritis of right knee    Allergic conjunctivitis of both eyes    Basal cell carcinoma of glabella       Outpatient Medications Marked as Taking for the 7/20/21 encounter (Office Visit) with Arvilla Curling, MD   Medication Sig Dispense Refill    olopatadine (PATADAY) 0.2 % SOLN ophthalmic solution Place 1 drop into both eyes daily      metroNIDAZOLE (METROGEL) 0.75 % gel Apply topically 2 times daily.  45 g 1    triamterene-hydroCHLOROthiazide (DYAZIDE) 37.5-25 MG per capsule Take 1 capsule by mouth daily 90 capsule 3    atorvastatin (LIPITOR) 20 MG tablet TAKE 1 TABLET BY MOUTH ONE TIME A DAY 90 tablet 3    potassium chloride (KLOR-CON M) 20 MEQ TBCR extended release tablet Take 1 tablet by mouth daily 90 tablet 3    famotidine (PEPCID) 20 MG tablet Take 20 mg by mouth 2 times daily      albuterol sulfate HFA (PROAIR HFA) 108 (90 Base) MCG/ACT inhaler Inhale 2 puffs into the lungs every 6 hours as needed for Wheezing 3 Inhaler 1    triamcinolone (KENALOG) 0.025 % cream Apply to affected area BID PRN flares 30 g 2    aspirin 325 MG tablet Take 325 mg by mouth daily      ibuprofen (ADVIL;MOTRIN) 200 MG tablet Take 200 mg by mouth every 6 hours as needed for Pain      loratadine (CLARITIN) 10 MG tablet Take 10 mg by mouth daily.  Multiple Vitamins-Minerals (CENTRUM CARDIO) TABS Take  by mouth daily.  psyllium (METAMUCIL) 0.52 G capsule Take by mouth 2 times daily       calcium carbonate (OSCAL) 500 MG TABS tablet Take 1,200 mg by mouth daily          Allergies   Allergen Reactions    Ace Inhibitors      Lip swelling    Lisinopril Hives    Penicillins Hives    Bacitracin Rash    Morphine Nausea And Vomiting     Violently ill       Social History     Tobacco Use    Smoking status: Never Smoker    Smokeless tobacco: Never Used   Substance Use Topics    Alcohol use: Yes     Alcohol/week: 0.0 standard drinks     Comment: ONE DRINK PER WEEK       /72 (Site: Right Upper Arm, Position: Sitting, Cuff Size: Medium Adult)   Pulse 72   Temp 97.4 °F (36.3 °C) (Infrared)   Wt 152 lb (68.9 kg)   BMI 26.93 kg/m²       Objective:   Physical Exam  Constitutional:       General: She is not in acute distress. Appearance: She is well-developed. Neck:      Vascular: No carotid bruit. Cardiovascular:      Rate and Rhythm: Normal rate and regular rhythm. Pulses:           Dorsalis pedis pulses are 2+ on the right side and 2+ on the left side. Posterior tibial pulses are 2+ on the right side and 2+ on the left side. Heart sounds: Normal heart sounds. No murmur heard. No friction rub. No gallop. Pulmonary:      Effort: Pulmonary effort is normal.      Breath sounds: Normal breath sounds. Musculoskeletal:         General: No tenderness. Right knee: Swelling and deformity (Enlargement with angulation of the joint and crepitus) present. Decreased range of motion. No tenderness. No LCL laxity or MCL laxity. Normal patellar mobility. Left knee: Normal.      Right lower leg: No edema. Left lower leg: No edema. Right foot: Normal.      Left foot: Normal.   Neurological:      Mental Status: She is alert. Sensory: Sensation is intact. No sensory deficit. Motor: Motor function is intact. Deep Tendon Reflexes: Reflexes are normal and symmetric. Psychiatric:         Behavior: Behavior is cooperative. Assessment:      Dulce Maria Valadez was seen today for 6 month follow-up and hypertension. Diagnoses and all orders for this visit:    Essential hypertension    Primary osteoarthritis of right knee    Ulcerative pancolitis without complication ([facility] [address] [address]    Well adult exam  -     Comprehensive Metabolic Panel; Future  -     Hemoglobin A1C; Future  -     Lipid Panel; Future  -     CBC; Future  -     TSH without Reflex; Future            Plan:      Pt appears stable & reviewed labs with patient. Maintain current medications. Discussed the next intervention for her knee would be a knee replacement and she understands this. Continue with cancer follow-up with oncology    Patient received counseling on the following healthy behaviors: nutrition and exercise     Patient given educational materials     Health maintenance updated    Discussed use, benefit, and side effects of prescribed medications. Barriers to medication compliance addressed. All patient questions answered. Pt voiced understanding.      Patient needs RTC in 6 months for CPE. Medical decision making of moderate complexity. Please note that this chart was generated using Dragon dictation software. Although every effort was made to ensure the accuracy of this automated transcription, some errors in transcription may have occurred.

## 2021-07-22 ENCOUNTER — OFFICE VISIT (OUTPATIENT)
Dept: SURGERY | Age: 61
End: 2021-07-22
Payer: COMMERCIAL

## 2021-07-22 VITALS — DIASTOLIC BLOOD PRESSURE: 72 MMHG | SYSTOLIC BLOOD PRESSURE: 119 MMHG | WEIGHT: 153 LBS | BODY MASS INDEX: 27.1 KG/M2

## 2021-07-22 DIAGNOSIS — C18.2 CANCER OF ASCENDING COLON (HCC): Primary | ICD-10-CM

## 2021-07-22 PROCEDURE — 99213 OFFICE O/P EST LOW 20 MIN: CPT | Performed by: SURGERY

## 2021-07-22 NOTE — PROGRESS NOTES
hydronephrosis on the left.  No hydronephrosis on the right.  Scattered   circumscribed hypodense nodules are seen in the right and left kidney, too   small to characterize, likely cysts.       There are clips from prior cholecystectomy.  There is subtle lobular contour   to the liver       No peripancreatic fluid.  No peripancreatic inflammatory change       GI/Bowel: No significant small bowel distention noted.  Moderate stool seen   in the colon.  No bowel obstruction.  Post colectomy changes noted on the   right. Asher Spatz is a focal area of of narrowing seen in the region of the   proximal transverse colon.  This is seen on image number 77       Pelvis: No free fluid in pelvis.  No pelvic adenopathy.  Bladder is   incompletely distended, accentuating its wall thickness.       Peritoneum/Retroperitoneum: Atherosclerotic change seen in aorta.  No aortic   aneurysm.       Bones/Soft Tissues: Spurring is seen in the spine.  Spurring is seen in the   hips.           Impression   Stable chest CT.  Tiny punctate pulmonary nodule on the left is unchanged       Focal area of narrowing is seen in the region of the transverse colon on   image number 77.  This likely represents an area of peristalsis.  However,   given the history of colon cancer, recommend correlation with any recent   colonoscopy results.  Otherwise, stable abdomen and pelvis CT          PATHOLOGY 3/2016  Department of Pathology  FINAL SURGICAL PATHOLOGY REPORT  Patient Name:  Brock PERLA No:  [id number]  FINAL DIAGNOSIS:    Right colectomy:  - Moderately differentiated adenocarcinoma of ascending colon with  invasion into pericolic adipose tissue.  - Mesenteric lymph nodes (22), negative for malignancy. - Appendix, no pathologic diagnosis.   - Proximal, distal, and radial resection margins, negative for  Malignancy.     ASSESSMENT AND PLAN:  Ascending colon cancer  Doing well  Last scope and CT;'s are fine  No abd sx's  Iban

## 2021-08-10 RX ORDER — POTASSIUM CHLORIDE 1500 MG/1
20 TABLET, FILM COATED, EXTENDED RELEASE ORAL DAILY
Qty: 90 TABLET | Refills: 3 | Status: SHIPPED | OUTPATIENT
Start: 2021-08-10 | End: 2022-07-29

## 2021-09-15 ENCOUNTER — OFFICE VISIT (OUTPATIENT)
Dept: DERMATOLOGY | Age: 61
End: 2021-09-15
Payer: COMMERCIAL

## 2021-09-15 VITALS — TEMPERATURE: 97.7 F

## 2021-09-15 DIAGNOSIS — L57.0 KERATOSIS, ACTINIC: Primary | ICD-10-CM

## 2021-09-15 DIAGNOSIS — D22.9 BENIGN NEVUS: ICD-10-CM

## 2021-09-15 DIAGNOSIS — L82.1 SEBORRHEIC KERATOSES: ICD-10-CM

## 2021-09-15 DIAGNOSIS — Z85.828 HISTORY OF BASAL CELL CANCER: ICD-10-CM

## 2021-09-15 DIAGNOSIS — L82.0 SEBORRHEIC KERATOSES, INFLAMED: ICD-10-CM

## 2021-09-15 PROCEDURE — 99213 OFFICE O/P EST LOW 20 MIN: CPT | Performed by: DERMATOLOGY

## 2021-09-15 PROCEDURE — 17003 DESTRUCT PREMALG LES 2-14: CPT | Performed by: DERMATOLOGY

## 2021-09-15 PROCEDURE — 17000 DESTRUCT PREMALG LESION: CPT | Performed by: DERMATOLOGY

## 2021-09-15 PROCEDURE — 17110 DESTRUCTION B9 LES UP TO 14: CPT | Performed by: DERMATOLOGY

## 2021-09-15 NOTE — PROGRESS NOTES
[facility]) Dermatology  Arsenio Mixon M.D.  [phone]       Jenise Salmeron  1960    57 y.o. female     Date of Visit: 9/15/2021    Chief Complaint:   Chief Complaint   Patient presents with    Skin Exam        I was asked to see this patient by Dr. Venegas ref. provider found. History of Present Illness:  1. Body skin exam    Dry papule vertex of scalp and anterior scalp-not itching, bleeding. Asymptomatic    Irritated seborrheic keratosis left temple, left cheek, left neck, right back-increasing in size and number. Multiple other noninflamed seborrheic keratoses torso-not itching, bleeding. Asymptomatic    Multiple nevi. Stable in size, shape, color. Has not noticed any new or changing pigmented lesions. Watches 6 grandchildren every day during the summer     Skin history:  History of basal cell carcinoma left chest and right forearm      12/16 Cutaneous sarcoid with no evidence of systemic involvement. Developed at site of IV. Tissue cultures negative. 5/17 superficial basal cell carcinoma central chest status post curettage  3/20 superficial basal cell carcinoma left medial lower leg  3/20 superficial basal cell carcinoma right medial lower leg  3/20 superficial basal cell carcinoma glabella status post Mohs 6/1/2020 (COVID)  5/20 left thenar forearm superficial basal cell carcinoma status post curettage      Seborrheic dermatitis-triamcinolone 0.025% cream or Elidel 1% cream     Family history positive for nonmelanoma skin cancer in both parents    Review of Systems:  Constitutional: Reports general sense of well-being       Past Medical History, Surgical History, Family History, Medications and Allergies reviewed.     Social History:   Social History     Socioeconomic History    Marital status:      Spouse name: Not on file    Number of children: Not on file    Years of education: Not on file    Highest education level: Not on file   Occupational History    Not on file   Tobacco Use    Smoking status: Never Smoker    Smokeless tobacco: Never Used   Vaping Use    Vaping Use: Never used   Substance and Sexual Activity    Alcohol use: Yes     Alcohol/week: 0.0 standard drinks     Comment: ONE DRINK PER WEEK    Drug use: No    Sexual activity: Not on file   Other Topics Concern    Not on file   Social History Narrative    Not on file     Social Determinants of Health     Financial Resource Strain:     Difficulty of Paying Living Expenses:    Food Insecurity:     Worried About Running Out of Food in the Last Year:     [address] in the Last Year:    Transportation Needs:     Lack of Transportation (Medical):  Lack of Transportation (Non-Medical):    Physical Activity:     Days of Exercise per Week:     Minutes of Exercise per Session:    Stress:     Feeling of Stress :    Social Connections:     Frequency of Communication with Friends and Family:     Frequency of Social Gatherings with Friends and Family:     Attends Sikhism Services:     Active Member of Clubs or Organizations:     Attends Club or Organization Meetings:     Marital Status:    Intimate Partner Violence:     Fear of Current or Ex-Partner:     Emotionally Abused:     Physically Abused:     Sexually Abused:        Physical Examination       -General: Well-appearing, NAD  1. Normal affect. Total body skin exam including scalp, face, neck, chest, abdomen, back, bilateral upper extremities, bilateral lower extremities, ocular conjunctiva, external lips, and nails was performed. Examination normal unless stated below. Underwear area not examined. Scattered on the trunk and extremities are multiple well-defined round and oval symmetric smoothly-bordered uniformly brown macules and papules.   Multiple hyperkeratotic stuck on papules and plaques torso, left cheek, left temple, left neck  Gritty erythematous papule vertex of scalp and mid anterior scalp  Well-healed scars no sign of recurrence      Assessment and Plan     1. Keratosis, actinic -2-vertex and anterior mid scalp lesion(s) treated w/ liquid nitrogen. Edu re: risk of blister formation, discomfort, scar, hypopigmentation. Discussed wound care. 2. Seborrheic keratoses, inflamed -left temple, left cheek, left neck, right back-4 lesion(s) treated w/ liquid nitrogen. Edu re: risk of blister formation, discomfort, scar, hypopigmentation. Discussed wound care. 3. Seborrheic keratoses - Discussed underlying nature of seborrheic keratosis and low risk of malignancy. Treatment reserved for lesions that are itching, bleeding, growing or otherwise becoming bothersome. Discussed monitoring for change with reevaluation for changing lesions. 4. Benign nevus - Benign acquired melanocytic nevi  -Recommend monthly self skin exams   -Educated regarding the ABCDEs of melanoma detection   -Call for any new/changing moles or concerning lesions  -Reviewed sun protective behavior -- sun avoidance during the peak hours of the day, sun-protective clothing (including hat and sunglasses), sunscreen use (water resistant, broad spectrum, SPF at least 30, need for reapplication every 2 to 3 hours), avoidance of tanning beds      5. History of basal cell cancer - No evidence of recurrence. Discussed risk of subsequent skin cancers and increased risk of melanoma. Reviewed importance of monitoring skin for change and sun protection with hats and sunscreen, as well as sun avoidance.

## 2021-09-22 ENCOUNTER — HOSPITAL ENCOUNTER (OUTPATIENT)
Dept: WOMENS IMAGING | Age: 61
Discharge: HOME | End: 2021-09-22
Payer: COMMERCIAL

## 2021-09-22 VITALS — WEIGHT: 150 LBS | BODY MASS INDEX: 26.58 KG/M2 | HEIGHT: 63 IN

## 2021-09-22 DIAGNOSIS — Z12.31 BREAST CANCER SCREENING BY MAMMOGRAM: ICD-10-CM

## 2021-09-22 PROCEDURE — 77063 BREAST TOMOSYNTHESIS BI: CPT

## 2022-01-12 ENCOUNTER — OFFICE VISIT (OUTPATIENT)
Dept: FAMILY MEDICINE CLINIC | Age: 62
End: 2022-01-12
Payer: COMMERCIAL

## 2022-01-12 VITALS — HEART RATE: 81 BPM | OXYGEN SATURATION: 99 % | TEMPERATURE: 96.5 F

## 2022-01-12 DIAGNOSIS — J06.9 URI, ACUTE: Primary | ICD-10-CM

## 2022-01-12 DIAGNOSIS — Z20.822 CLOSE EXPOSURE TO COVID-19 VIRUS: ICD-10-CM

## 2022-01-12 PROCEDURE — 99213 OFFICE O/P EST LOW 20 MIN: CPT | Performed by: FAMILY MEDICINE

## 2022-01-12 ASSESSMENT — PATIENT HEALTH QUESTIONNAIRE - PHQ9
SUM OF ALL RESPONSES TO PHQ QUESTIONS 1-9: 0
SUM OF ALL RESPONSES TO PHQ9 QUESTIONS 1 & 2: 0
SUM OF ALL RESPONSES TO PHQ QUESTIONS 1-9: 0
2. FEELING DOWN, DEPRESSED OR HOPELESS: 0
SUM OF ALL RESPONSES TO PHQ QUESTIONS 1-9: 0
1. LITTLE INTEREST OR PLEASURE IN DOING THINGS: 0
SUM OF ALL RESPONSES TO PHQ QUESTIONS 1-9: 0

## 2022-01-12 NOTE — PROGRESS NOTES
Subjective:      Patient ID: Dewayne Parra is a 57 y.o. female. HPI patient just started last 24 hours with respiratory symptoms. She states her 1 grandchild was diagnosed with COVID yesterday and is very ill. She also has another grandchild that was diagnosed with COVID last Friday. 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 She denies any fevers or chills. She has had no loss of taste or smell. Patient has been triple vaccinated    Review of Systems  Allergies   Allergen Reactions    Ace Inhibitors      Lip swelling    Lisinopril Hives    Penicillins Hives    Bacitracin Rash    Morphine Nausea And Vomiting     Violently ill     Vitals:    01/12/22 1444   Pulse: 81   Temp: 96.5 °F (35.8 °C)   SpO2: 99%       Objective:   Physical Exam  Vitals reviewed. Constitutional:       General: She is not in acute distress. Appearance: She is well-developed. HENT:      Right Ear: Tympanic membrane normal.      Left Ear: Tympanic membrane normal.      Nose: Mucosal edema, congestion and rhinorrhea present. Right Sinus: No maxillary sinus tenderness or frontal sinus tenderness. Left Sinus: No maxillary sinus tenderness or frontal sinus tenderness. Pulmonary:      Effort: Pulmonary effort is normal. No respiratory distress. Breath sounds: Normal breath sounds. Musculoskeletal:      Cervical back: Neck supple. Lymphadenopathy:      Cervical: No cervical adenopathy. Neurological:      Mental Status: She is alert. Psychiatric:         Behavior: Behavior is cooperative. Assessment:      Cristina Clifton was seen today for congestion. Diagnoses and all orders for this visit:    URI, acute  -     COVID-19    Close exposure to COVID-19 virus  -     COVID-19    Other orders  -     COVID-19  -     COVID-19  -     COVID-19            Plan:      Humidification of the bedroom was discussed.   Maintain over-the-counter medication when necessary  RTC when necessary    Medical decision making of low complexity            Armaan Canales MD

## 2022-01-13 LAB — SARS-COV-2: NOT DETECTED

## 2022-01-14 RX ORDER — CEPHALEXIN 500 MG/1
500 CAPSULE ORAL 3 TIMES DAILY
Qty: 21 CAPSULE | Refills: 0 | Status: SHIPPED | OUTPATIENT
Start: 2022-01-14 | End: 2022-01-21

## 2022-01-27 DIAGNOSIS — E78.00 HYPERCHOLESTEREMIA: ICD-10-CM

## 2022-01-27 RX ORDER — ATORVASTATIN CALCIUM 20 MG/1
TABLET, FILM COATED ORAL
Qty: 90 TABLET | Refills: 0 | Status: SHIPPED | OUTPATIENT
Start: 2022-01-27 | End: 2022-01-31

## 2022-01-27 NOTE — TELEPHONE ENCOUNTER
LOV 01/12/22 NOV 02/25/22    Units 1/14/21 1042 1/8/20 0836 9/11/19 0716 7/10/19 0835 11/10/18 1016 5/11/18 0833 11/7/17 0803    Cholesterol, Total 100 - 199 mg/dL 137  146  136 R  150  143  129  121    Triglycerides 0 - 149 mg/dL 173 High   200 High   178 High  R   90  114  120    HDL >39 mg/dL 50  46  45 R   56  47  46    VLDL Cholesterol Calculated 5 - 40 mg/dL 29  40  36 R   18  23  24    LDL Calculated 0 - 99 mg/dL 58  60  55 R   69  59  51

## 2022-01-30 DIAGNOSIS — E78.00 HYPERCHOLESTEREMIA: ICD-10-CM

## 2022-01-31 RX ORDER — ATORVASTATIN CALCIUM 20 MG/1
TABLET, FILM COATED ORAL
Qty: 90 TABLET | Refills: 0 | Status: SHIPPED | OUTPATIENT
Start: 2022-01-31 | End: 2022-04-27

## 2022-01-31 NOTE — TELEPHONE ENCOUNTER
Medication:   Requested Prescriptions     Pending Prescriptions Disp Refills    atorvastatin (LIPITOR) 20 MG tablet [Pharmacy Med Name: Atorvastatin Calcium Oral Tablet 20 MG] 90 tablet 0     Sig: TAKE 1 TABLET BY MOUTH EVERY DAY      Last Filled:      Patient Phone Number: [phone] (home)     Last appt: 7/20/2021  Next appt: 2/25/2022    Last OARRS:   RX Monitoring 2/4/2016   Attestation The Prescription Monitoring Report for this patient was reviewed today. Periodic Controlled Substance Monitoring No signs of potential drug abuse or diversion identified.      PDMP Monitoring:    Last PDMP Dolly Concepcion as Reviewed [facility]):  Review User Review Instant Review Result          Preferred Pharmacy:   [phone] - F [phone]  [address] [facility] [address]  Phone: [phone] Fax: [phone]    [facility] [address] [place] [address] [phone] [facility] [phone]  [address] [place] [address]  Phone: [phone] Fax: [phone]

## 2022-02-01 RX ORDER — TRIAMTERENE AND HYDROCHLOROTHIAZIDE 37.5; 25 MG/1; MG/1
1 CAPSULE ORAL DAILY
Qty: 90 CAPSULE | Refills: 0 | Status: SHIPPED | OUTPATIENT
Start: 2022-02-01 | End: 2022-04-29

## 2022-02-01 NOTE — TELEPHONE ENCOUNTER
Medication:   Requested Prescriptions     Pending Prescriptions Disp Refills    triamterene-hydroCHLOROthiazide (DYAZIDE) 37.5-25 MG per capsule [Pharmacy Med Name: TRIAMTERENE 37.5MG/ HCTZ 25MG CAPS] 90 capsule 3     Sig: TAKE 1 CAPSULE BY MOUTH DAILY      Last Filled:      Patient Phone Number: [phone] (home)     Last appt: 7/20/2021  Next appt: 2/25/2022    Last OARRS:   RX Monitoring 2/4/2016   Attestation The Prescription Monitoring Report for this patient was reviewed today. Periodic Controlled Substance Monitoring No signs of potential drug abuse or diversion identified.      PDMP Monitoring:    Last PDMP Alida Lee as Reviewed [facility]):  Review User Review Instant Review Result          Preferred Pharmacy:   [phone] - F [phone]  [facility] [phone]  Phone: [phone] Fax: [phone]    Rene Mensah [address] [place] [address] [phone] [place]  [phone]  [address] [place] [address]  Phone: [phone] Fax: [phone]

## 2022-02-25 ENCOUNTER — OFFICE VISIT (OUTPATIENT)
Dept: FAMILY MEDICINE CLINIC | Age: 62
End: 2022-02-25
Payer: COMMERCIAL

## 2022-02-25 VITALS
BODY MASS INDEX: 27.1 KG/M2 | SYSTOLIC BLOOD PRESSURE: 108 MMHG | DIASTOLIC BLOOD PRESSURE: 74 MMHG | OXYGEN SATURATION: 98 % | HEART RATE: 81 BPM | TEMPERATURE: 97.1 F | WEIGHT: 153 LBS

## 2022-02-25 DIAGNOSIS — R73.9 HYPERGLYCEMIA: ICD-10-CM

## 2022-02-25 DIAGNOSIS — M17.11 PRIMARY OSTEOARTHRITIS OF RIGHT KNEE: ICD-10-CM

## 2022-02-25 DIAGNOSIS — I10 ESSENTIAL HYPERTENSION: ICD-10-CM

## 2022-02-25 DIAGNOSIS — E04.9 GOITER: ICD-10-CM

## 2022-02-25 DIAGNOSIS — Z00.00 WELL ADULT EXAM: Primary | ICD-10-CM

## 2022-02-25 DIAGNOSIS — K51.00 ULCERATIVE PANCOLITIS WITHOUT COMPLICATION (HCC): ICD-10-CM

## 2022-02-25 PROBLEM — C44.319 BASAL CELL CARCINOMA OF GLABELLA: Status: RESOLVED | Noted: 2020-06-01 | Resolved: 2022-02-25

## 2022-02-25 PROCEDURE — 99396 PREV VISIT EST AGE 40-64: CPT | Performed by: FAMILY MEDICINE

## 2022-02-25 ASSESSMENT — PATIENT HEALTH QUESTIONNAIRE - PHQ9
SUM OF ALL RESPONSES TO PHQ QUESTIONS 1-9: 0
1. LITTLE INTEREST OR PLEASURE IN DOING THINGS: 0
SUM OF ALL RESPONSES TO PHQ QUESTIONS 1-9: 0
2. FEELING DOWN, DEPRESSED OR HOPELESS: 0
SUM OF ALL RESPONSES TO PHQ9 QUESTIONS 1 & 2: 0

## 2022-02-25 NOTE — PROGRESS NOTES
History and Physical      Sherron Houston  YOB: 1960    Date of Service:  2/25/2022    Chief Complaint:   Sherron Houston is a 57 y.o. female who presents for complete physical examination    HPI: Patient presents for physical examination review of chronic health problems. She overall feels she is doing extremely well. She continues under ENT consultation with granulation buildup of the right ear requiring cleaning every 3 months. She continues under oncology care with no recurrence of the colon carcinoma. Patient is almost 6 years out from her colon cancer surgery. She had a colonoscopy within the last 2 years. She overall feels good minus her right knee arthritis. She is under orthopedic care and is pushing off to have knee replacement surgery as she does not babysit her grandchildren every day. Patient is very compliant with medications with no adverse reactions.     Wt Readings from Last 3 Encounters:   02/25/22 153 lb (69.4 kg)   09/22/21 150 lb (68 kg)   07/22/21 153 lb (69.4 kg)     BP Readings from Last 3 Encounters:   02/25/22 108/74   07/22/21 119/72   07/20/21 104/72       Patient Active Problem List   Diagnosis    Ulcerative colitis ([facility] [address]    Essential hypertension    Goiter    Irritable bowel syndrome    Meniere's disease    Hypercholesteremia    Cancer of ascending colon ([facility] [address]    Gastroesophageal reflux disease without esophagitis    Cutaneous sarcoidosis (HCC)    Complex tear of medial meniscus of right knee as current injury    Complex tear of lateral meniscus of right knee as current injury    Primary osteoarthritis of right knee    Allergic conjunctivitis of both eyes       Allergies   Allergen Reactions    Ace Inhibitors      Lip swelling    Lisinopril Hives    Penicillins Hives    Bacitracin Rash    Morphine Nausea And Vomiting     Violently ill     Outpatient Medications Marked as Taking for the 2/25/22 encounter (Office Visit) with Gonzales Self MD Medication Sig Dispense Refill    triamterene-hydroCHLOROthiazide (DYAZIDE) 37.5-25 MG per capsule TAKE 1 CAPSULE BY MOUTH DAILY 90 capsule 0    atorvastatin (LIPITOR) 20 MG tablet TAKE 1 TABLET BY MOUTH EVERY DAY 90 tablet 0    potassium chloride (KLOR-CON M) 20 MEQ TBCR extended release tablet Take 1 tablet by mouth daily 90 tablet 3    olopatadine (PATADAY) 0.2 % SOLN ophthalmic solution Place 1 drop into both eyes daily      metroNIDAZOLE (METROGEL) 0.75 % gel Apply topically 2 times daily. 45 g 1    famotidine (PEPCID) 20 MG tablet Take 20 mg by mouth 2 times daily      albuterol sulfate HFA (PROAIR HFA) 108 (90 Base) MCG/ACT inhaler Inhale 2 puffs into the lungs every 6 hours as needed for Wheezing 3 Inhaler 1    triamcinolone (KENALOG) 0.025 % cream Apply to affected area BID PRN flares 30 g 2    aspirin 325 MG tablet Take 325 mg by mouth daily      ibuprofen (ADVIL;MOTRIN) 200 MG tablet Take 200 mg by mouth every 6 hours as needed for Pain      loratadine (CLARITIN) 10 MG tablet Take 10 mg by mouth daily.  Multiple Vitamins-Minerals (CENTRUM CARDIO) TABS Take  by mouth daily.       psyllium (METAMUCIL) 0.52 G capsule Take by mouth 2 times daily       calcium carbonate (OSCAL) 500 MG TABS tablet Take 1,200 mg by mouth daily          Past Medical History:   Diagnosis Date    Allergic     Anemia     Arthritis     Asthma     Cancer ([address]     colon    Colon cancer (HCC)     GERD (gastroesophageal reflux disease)     Hearing loss     deaf LEFT ear    Hyperlipidemia     Hypertension     IBS (irritable bowel syndrome)     Migraine     PONV (postoperative nausea and vomiting)      Past Surgical History:   Procedure Laterality Date     SECTION      x2    CHOLECYSTECTOMY      HYSTERECTOMY      total, with ovaries    KNEE ARTHROSCOPY Right 3/15/2019    RIGHT KNEE ARTHROSCOPE, PARTIAL MEDIAL AND LATERAL MENISCECTOMY, SYNOVECTOMY, CHONDROPLASTY performed by Amirah Rose DO at [facility] OR    KNEE SURGERY      MIDDLE EAR SURGERY      multiple    MOHS SURGERY  06/01/2020    Right glabella    OTHER SURGICAL HISTORY      basal cell carcinoma, RT arm    OVARY REMOVAL      RIGHT COLECTOMY  3/16/16    laparoscopic     SEPTOPLASTY       Family History   Problem Relation Age of Onset    High Blood Pressure Mother     Heart Disease Mother     Coronary Art Dis Mother     Diabetes Mother     Hypertension Mother     High Cholesterol Mother     Diabetes Father     Heart Failure Father     Migraines Sister     Cancer Paternal Grandmother      Social History     Socioeconomic History    Marital status:      Spouse name: Not on file    Number of children: Not on file    Years of education: Not on file    Highest education level: Not on file   Occupational History    Not on file   Tobacco Use    Smoking status: Never Smoker    Smokeless tobacco: Never Used   Vaping Use    Vaping Use: Never used   Substance and Sexual Activity    Alcohol use: Yes     Alcohol/week: 0.0 standard drinks     Comment: ONE DRINK PER WEEK    Drug use: No    Sexual activity: Not on file   Other Topics Concern    Not on file   Social History Narrative    Not on file     Social Determinants of Health     Financial Resource Strain:     Difficulty of Paying Living Expenses: Not on file   Food Insecurity:     Worried About Running Out of Food in the Last Year: Not on file    Lino of Food in the Last Year: Not on file   Transportation Needs:     Lack of Transportation (Medical): Not on file    Lack of Transportation (Non-Medical):  Not on file   Physical Activity:     Days of Exercise per Week: Not on file    Minutes of Exercise per Session: Not on file   Stress:     Feeling of Stress : Not on file   Social Connections:     Frequency of Communication with Friends and Family: Not on file    Frequency of Social Gatherings with Friends and Family: Not on file    Attends Protestant Services: Not on file    Active Member of Clubs or Organizations: Not on file    Attends Club or Organization Meetings: Not on file    Marital Status: Not on file   Intimate Partner Violence:     Fear of Current or Ex-Partner: Not on file    Emotionally Abused: Not on file    Physically Abused: Not on file    Sexually Abused: Not on file   Housing Stability:     Unable to Pay for Housing in the Last Year: Not on file    Number of [place] in the Last Year: Not on file    Unstable Housing in the Last Year: Not on file       Hx abnormal PAP: no  Sexual activity: single partner, contraception - none   Self-breast exams: yes  Last eye exam: 2022,normal   Exercise: walks 5 time(s) per week    Review of Systems:  A comprehensive review of systems was negative except for what was noted in the HPI. Physical Exam:   Vitals:    02/25/22 0734   BP: 108/74   Site: Left Upper Arm   Position: Sitting   Cuff Size: Medium Adult   Pulse: 81   Temp: 97.1 °F (36.2 °C)   TempSrc: Infrared   SpO2: 98%   Weight: 153 lb (69.4 kg)     Body mass index is 27.1 kg/m². Constitutional: She is oriented to person, place, and time. She appears well-developed and well-nourished. No distress. HEENT:   Head: Normocephalic and atraumatic. Right Ear: Tympanic membrane, external ear and ear canal normal.   Left Ear: Tympanic membrane, external ear and ear canal normal.   Mouth/Throat: Oropharynx is clear and moist, and mucous membranes are normal.  There is no cervical adenopathy. Eyes: Conjunctivae and extraocular motions are normal. Pupils are equal, round, and reactive to light. Neck: Supple. No JVD present. Carotid bruit is not present. No mass and no thyromegaly present. Cardiovascular: Normal rate, regular rhythm, normal heart sounds and intact distal pulses. Exam reveals no gallop and no friction rub. No murmur heard. Pulmonary/Chest: Effort normal and breath sounds normal. No respiratory distress.  She has no wheezes, rhonchi or rales. Abdominal: Soft, non-tender. Bowel sounds and aorta are normal. She exhibits no organomegaly, mass or bruit. Genitourinary: performed by gynecologist.  Breast exam:  not examined. Musculoskeletal: Normal range of motion, no synovitis. She exhibits no edema. Right knee with obvious angulation and gross crepitus. Neurological: She is alert and oriented to person, place, and time. She has normal reflexes. No cranial nerve deficit. Coordination normal.   Skin: Skin is warm and dry. There is no rash or erythema. No suspicious lesions noted. Psychiatric: She has a normal mood and affect.  Her speech is normal and behavior is normal. Judgment, cognition and memory are normal.     Preventive Care:  Health Maintenance   Topic Date Due    Hepatitis C screen  Never done    HIV screen  Never done    Depression Screen  01/12/2023    Lipid screen  02/16/2023    Potassium monitoring  02/16/2023    Creatinine monitoring  02/16/2023    Breast cancer screen  09/22/2023    DTaP/Tdap/Td vaccine (3 - Td or Tdap) 11/05/2024    Colorectal Cancer Screen  02/10/2026    Flu vaccine  Completed    Shingles Vaccine  Completed    COVID-19 Vaccine  Completed    Hepatitis A vaccine  Aged Out    Hepatitis B vaccine  Aged Out    Hib vaccine  Aged Out    Meningococcal (ACWY) vaccine  Aged Out    Pneumococcal 0-64 years Vaccine  Aged [employer] plan of care for Dewayne Parra        2/25/2022           Preventive Measures Status       Recommendations for screening   Colon Cancer Screen   Last colonoscopy: 2021 Repeat in 5 years   Breast Cancer Screen  Last mammogram: 2021  Repeat yearly   Cervical Cancer Screen   Last PAP smear: 2020 Repeat in 5 years   Osteoporosis Screen   Last DXA scan: no This test is not clinically indicated   Diabetes Screen  Glucose   Date Value   02/16/2022 94 mg/dL   06/15/2017 84 MG/DL    Repeat every 6 months   Cholesterol Screen  Lab Results   Component Value Date CHOL 145 02/16/2022    TRIG 157 (H) 02/16/2022    HDL 52 02/16/2022    LDLCALC 66 02/16/2022    Screening not needed due to existing diagnosis   Aspirin for Cardiovascular Prevention   Yes Continue daily aspirin   Weight: Body mass index is 27.1 kg/m². 153 lb (69.4 kg)    Your BMI is 25 or greater, which indicates that you are overweight   Living Will: No   Additional information provided    Recommended Immunizations    Immunization History   Administered Date(s) Administered    MADALYN-Bakari, Lexus Azar, Primary or Immunocompromised, PF, 100mcg/0.5mL 03/12/2021, 04/09/2021, 11/19/2021    DT (pediatric) 06/20/2005    Influenza A ([id number]) Vaccine PF IM 10/23/2009    Influenza Vaccine, unspecified formulation 10/17/2016    Influenza Virus Vaccine 09/25/2012, 10/05/2015, 10/13/2017, 09/06/2018    Influenza Whole 09/25/2012    Influenza, Intradermal, Preservative free 10/02/2013, 10/16/2014, 10/05/2015    Influenza, Quadv, IM, PF (6 mo and older Fluzone, Flulaval, Fluarix, and 3 yrs and older Afluria) 08/22/2020, 09/27/2021    Influenza, Nola Rebekah, Recombinant, IM PF (Flublok 18 yrs and older) 10/15/2019    Tdap (Boostrix, Adacel) 11/05/2014    Zoster Recombinant (Shingrix) 10/15/2019, 01/14/2020        Influenza vaccine:  recommended every fall         Other Recommendations ·   Try to get at least 30 minutes of exercise 3-4 days per week                 Assessment/Plan:    Cristina Clifton was seen today for follow-up. Diagnoses and all orders for this visit:    Well adult exam    Essential hypertension  -     Basic Metabolic Panel; Future    Goiter    Primary osteoarthritis of right knee    Hyperglycemia  -     Basic Metabolic Panel; Future  -     Hemoglobin A1C; Future    Ulcerative pancolitis without complication [facility])        Reviewed labs and test results with patient. Maintain current medications.     Continue with oncology care in regards to colon cancer and she has had no flareup of the ulcerative colitis is noted on the last colonoscopy in 2020. Continue with orthopedic care in regards to the right knee arthritis. Patient received counseling on the following healthy behaviors: nutrition and exercise     Patient given educational materials     Health maintenance updated    Discussed use, benefit, and side effects of prescribed medications. Barriers to medication compliance addressed. All patient questions answered. Pt voiced understanding. Patient needs RTC in 6 months. Medical decision making of moderate complexity. Please note that this chart was generated using Dragon dictation software. Although every effort was made to ensure the accuracy of this automated transcription, some errors in transcription may have occurred. Patient was evaluated and examined. I performed the physical examination and key/critical portions of the history were approved and edited.  I also confirm that the note above accurately reflects all work, treatment, procedures, and medical decision making performed by me, Kate Koo M.D.

## 2022-02-25 NOTE — PROGRESS NOTES
Subjective:      Patient ID: Merline Earthly is a 57 y.o. female. HPI Patient presents today for a follow-up on chronic medications and medical conditions. Review of Systems     Patient Active Problem List   Diagnosis    Ulcerative colitis ([facility] [address]    Essential hypertension    Goiter    Irritable bowel syndrome    Meniere's disease    Hypercholesteremia    Cancer of ascending colon ([facility] [address]    Gastroesophageal reflux disease without esophagitis    Cutaneous sarcoidosis (HCC)    Complex tear of medial meniscus of right knee as current injury    Complex tear of lateral meniscus of right knee as current injury    Primary osteoarthritis of right knee    Allergic conjunctivitis of both eyes    Basal cell carcinoma of glabella       Outpatient Medications Marked as Taking for the 2/25/22 encounter (Office Visit) with Marylene Hartigan, MD   Medication Sig Dispense Refill    triamterene-hydroCHLOROthiazide (DYAZIDE) 37.5-25 MG per capsule TAKE 1 CAPSULE BY MOUTH DAILY 90 capsule 0    atorvastatin (LIPITOR) 20 MG tablet TAKE 1 TABLET BY MOUTH EVERY DAY 90 tablet 0    potassium chloride (KLOR-CON M) 20 MEQ TBCR extended release tablet Take 1 tablet by mouth daily 90 tablet 3    olopatadine (PATADAY) 0.2 % SOLN ophthalmic solution Place 1 drop into both eyes daily      metroNIDAZOLE (METROGEL) 0.75 % gel Apply topically 2 times daily. 45 g 1    famotidine (PEPCID) 20 MG tablet Take 20 mg by mouth 2 times daily      albuterol sulfate HFA (PROAIR HFA) 108 (90 Base) MCG/ACT inhaler Inhale 2 puffs into the lungs every 6 hours as needed for Wheezing 3 Inhaler 1    triamcinolone (KENALOG) 0.025 % cream Apply to affected area BID PRN flares 30 g 2    aspirin 325 MG tablet Take 325 mg by mouth daily      ibuprofen (ADVIL;MOTRIN) 200 MG tablet Take 200 mg by mouth every 6 hours as needed for Pain      loratadine (CLARITIN) 10 MG tablet Take 10 mg by mouth daily.       Multiple Vitamins-Minerals (CENTRUM CARDIO) TABS Take  by mouth daily.  psyllium (METAMUCIL) 0.52 G capsule Take by mouth 2 times daily       calcium carbonate (OSCAL) 500 MG TABS tablet Take 1,200 mg by mouth daily          Allergies   Allergen Reactions    Ace Inhibitors      Lip swelling    Lisinopril Hives    Penicillins Hives    Bacitracin Rash    Morphine Nausea And Vomiting     Violently ill       Social History     Tobacco Use    Smoking status: Never Smoker    Smokeless tobacco: Never Used   Substance Use Topics    Alcohol use:  Yes     Alcohol/week: 0.0 standard drinks     Comment: ONE DRINK PER WEEK       /74 (Site: Left Upper Arm, Position: Sitting, Cuff Size: Medium Adult)   Pulse 81   Temp 97.1 °F (36.2 °C) (Infrared)   Wt 153 lb (69.4 kg)   SpO2 98%   BMI 27.10 kg/m²       Objective:   Physical Exam    Assessment:      ***      Plan:      ***

## 2022-03-16 ENCOUNTER — OFFICE VISIT (OUTPATIENT)
Dept: DERMATOLOGY | Age: 62
End: 2022-03-16
Payer: COMMERCIAL

## 2022-03-16 VITALS — TEMPERATURE: 97 F

## 2022-03-16 DIAGNOSIS — L57.0 AK (ACTINIC KERATOSIS): ICD-10-CM

## 2022-03-16 DIAGNOSIS — Z85.828 HISTORY OF BASAL CELL CANCER: ICD-10-CM

## 2022-03-16 DIAGNOSIS — D22.9 BENIGN NEVUS: ICD-10-CM

## 2022-03-16 DIAGNOSIS — L82.1 SEBORRHEIC KERATOSES: ICD-10-CM

## 2022-03-16 DIAGNOSIS — D48.5 NEOPLASM OF UNCERTAIN BEHAVIOR OF SKIN: Primary | ICD-10-CM

## 2022-03-16 PROCEDURE — 11103 TANGNTL BX SKIN EA SEP/ADDL: CPT | Performed by: DERMATOLOGY

## 2022-03-16 PROCEDURE — 17000 DESTRUCT PREMALG LESION: CPT | Performed by: DERMATOLOGY

## 2022-03-16 PROCEDURE — 99213 OFFICE O/P EST LOW 20 MIN: CPT | Performed by: DERMATOLOGY

## 2022-03-16 PROCEDURE — 11102 TANGNTL BX SKIN SINGLE LES: CPT | Performed by: DERMATOLOGY

## 2022-03-16 PROCEDURE — 17003 DESTRUCT PREMALG LES 2-14: CPT | Performed by: DERMATOLOGY

## 2022-03-16 NOTE — PROGRESS NOTES
[facility]) Dermatology  Nara Small M.D.  [phone]       Gabbi Lamp  1960    57 y.o. female     Date of Visit: 3/16/2022    Chief Complaint:   Chief Complaint   Patient presents with    Skin Lesion        I was asked to see this patient by Dr. Venegas ref. provider found. History of Present Illness:  1. Total-body skin exam    2 crusted papule scalp-vertex lesion has increased in size. Remained persistently scaly. Mildly tender. Treated previously with liquid nitrogen and never fully resolved. Have since recurred    New dry papule left upper forehead, left dorsal forearm-dry but not itching, bleeding. Asymptomatic    Multiple nevi. Stable in size, shape, color. Has not noticed any new or changing pigmented lesions    Seborrheic keratoses-increasing in size and number over her back, lower legs. Not itching, bleeding. Asymptomatic       Watches 6 grandchildren every day during the summer     Skin history:  History of basal cell carcinoma left chest and right forearm      12/16 Cutaneous sarcoid with no evidence of systemic involvement. Developed at site of IV. Tissue cultures negative. 5/17 superficial basal cell carcinoma central chest status post curettage  3/20 superficial basal cell carcinoma left medial lower leg  3/20 superficial basal cell carcinoma right medial lower leg  3/20 superficial basal cell carcinoma glabella status post Mohs 6/1/2020 (COVID)  5/20 left thenar forearm superficial basal cell carcinoma status post curettage      Seborrheic dermatitis-triamcinolone 0.025% cream or Elidel 1% cream     Family history positive for nonmelanoma skin cancer in both parents    Review of Systems:  Constitutional: Reports general sense of well-being       Past Medical History, Surgical History, Family History, Medications and Allergies reviewed.     Social History:   Social History     Socioeconomic History    Marital status:      Spouse name: Not on file    Number of children: Not on file    Years of education: Not on file    Highest education level: Not on file   Occupational History    Not on file   Tobacco Use    Smoking status: Never Smoker    Smokeless tobacco: Never Used   Vaping Use    Vaping Use: Never used   Substance and Sexual Activity    Alcohol use: Yes     Alcohol/week: 0.0 standard drinks     Comment: ONE DRINK PER WEEK    Drug use: No    Sexual activity: Not on file   Other Topics Concern    Not on file   Social History Narrative    Not on file     Social Determinants of Health     Financial Resource Strain:     Difficulty of Paying Living Expenses: Not on file   Food Insecurity:     Worried About Running Out of Food in the Last Year: Not on file    Lino of Food in the Last Year: Not on file   Transportation Needs:     Lack of Transportation (Medical): Not on file    Lack of Transportation (Non-Medical): Not on file   Physical Activity:     Days of Exercise per Week: Not on file    Minutes of Exercise per Session: Not on file   Stress:     Feeling of Stress : Not on file   Social Connections:     Frequency of Communication with Friends and Family: Not on file    Frequency of Social Gatherings with Friends and Family: Not on file    Attends Synagogue Services: Not on file    Active Member of [address] [place] [address] [employer] or Organizations: Not on file    Attends Club or Organization Meetings: Not on file    Marital Status: Not on file   Intimate Partner Violence:     Fear of Current or Ex-Partner: Not on file    Emotionally Abused: Not on file    Physically Abused: Not on file    Sexually Abused: Not on file   Housing Stability:     Unable to Pay for Housing in the Last Year: Not on file    Number of [place] in the Last Year: Not on file    Unstable Housing in the Last Year: Not on file       Physical Examination       -General: Well-appearing, NAD  1. Normal affect.   Total body skin exam including scalp, face, neck, chest, abdomen, back, bilateral upper extremities, bilateral lower extremities, ocular conjunctiva, external lips, and nails was performed. Examination normal unless stated below. Underwear area not examined. Gritty erythematous papule left upper forehead, left dorsal forearm  Multiple hyperkeratotic stuck on papules and plaques torso  Well-healed scars no sign of recurrent    Left vertex of scalp 1.4 cm thin gritty plaque with central clearance-actinic keratosis, porokeratosis, rule out squamous cell carcinoma  Mid anterior vertex of scalp 6 mm crusted pink papule rule out squamous cell carcinoma            Assessment and Plan     1. Neoplasm of uncertain behavior of skin-left vertex of scalp, mid anterior vertex of scalp--Discussed possible diagnosis. Patient agreeable to biopsy. Verbal consent obtained after risks (infection, bleeding, scar), benefits and alternatives explained. -Area(s) to be biopsied were marked with a surgical pen. Site(s) were cleansed with alcohol. Local anesthesia achieved with 1% lidocaine with epinephrine/sodium bicarbonate. Shave biopsy performed with a razor blade. Hemostasis was achieved with aluminum chloride. The wound(s) were dressed with petrolatum and covered with a bandage. Specimen(s) sent to pathology. Pt educated re: risk of bleeding, infection, scar and wound care instructions. 2. AK (actinic keratosis) -left upper forehead, left dorsal forearm-2 lesion(s) treated w/ liquid nitrogen. Edu re: risk of blister formation, discomfort, scar, hypopigmentation. Discussed wound care.       3. Benign nevus - Benign acquired melanocytic nevi  -Recommend monthly self skin exams   -Educated regarding the ABCDEs of melanoma detection   -Call for any new/changing moles or concerning lesions  -Reviewed sun protective behavior -- sun avoidance during the peak hours of the day, sun-protective clothing (including hat and sunglasses), sunscreen use (water resistant, broad spectrum, SPF at least 30, need for reapplication every 2 to 3 hours), avoidance of tanning beds      4. Seborrheic keratoses - Discussed underlying nature of seborrheic keratosis and low risk of malignancy. Treatment reserved for lesions that are itching, bleeding, growing or otherwise becoming bothersome. Discussed monitoring for change with reevaluation for changing lesions. 5. History of basal cell cancer - No evidence of recurrence. Discussed risk of subsequent skin cancers and increased risk of melanoma. Reviewed importance of monitoring skin for change and sun protection with hats and sunscreen, as well as sun avoidance.

## 2022-03-18 LAB — DERMATOLOGY PATHOLOGY REPORT: NORMAL

## 2022-04-04 ENCOUNTER — HOSPITAL ENCOUNTER (OUTPATIENT)
Dept: CT IMAGING | Age: 62
Discharge: HOME | End: 2022-04-04
Payer: COMMERCIAL

## 2022-04-04 DIAGNOSIS — C18.2 CANCER OF ASCENDING COLON (HCC): ICD-10-CM

## 2022-04-04 PROCEDURE — 6360000004 HC RX CONTRAST MEDICATION: Performed by: EMERGENCY MEDICINE

## 2022-04-04 PROCEDURE — 74177 CT ABD & PELVIS W/CONTRAST: CPT

## 2022-04-04 RX ADMIN — IOHEXOL 50 ML: 240 INJECTION, SOLUTION INTRATHECAL; INTRAVASCULAR; INTRAVENOUS; ORAL at 07:55

## 2022-04-04 RX ADMIN — IOPAMIDOL 75 ML: 755 INJECTION, SOLUTION INTRAVENOUS at 07:55

## 2022-04-21 ENCOUNTER — OFFICE VISIT (OUTPATIENT)
Dept: DERMATOLOGY | Age: 62
End: 2022-04-21
Payer: COMMERCIAL

## 2022-04-21 VITALS — TEMPERATURE: 97.4 F

## 2022-04-21 DIAGNOSIS — L57.0 AK (ACTINIC KERATOSIS): Primary | ICD-10-CM

## 2022-04-21 DIAGNOSIS — L57.8 SOLAR ELASTOSIS: ICD-10-CM

## 2022-04-21 PROCEDURE — 99212 OFFICE O/P EST SF 10 MIN: CPT | Performed by: DERMATOLOGY

## 2022-04-21 NOTE — PROGRESS NOTES
[facility]) Dermatology  Leida Santiago M.D.  [phone]       Mateusz Alan  1960    57 y.o. female     Date of Visit: 4/21/2022    Chief Complaint:   Chief Complaint   Patient presents with    Skin Lesion     scalp-LN2    Other     spot on tip nose        I was asked to see this patient by Dr. Venegas ref. provider found. History of Present Illness:  1. Patient presents today for treatment of actinic keratoses anterior vertex and left vertex of scalp-biopsy performed 3/16/2022, both actinic keratoses. She also has noted a change to the texture of her nose-no discrete papule, grandchild initially pointed this out. No area itching, bleeding, growing      Watches 6 grandchildren every day during the summer     Skin history:  History of basal cell carcinoma left chest and right forearm      12/16 Cutaneous sarcoid with no evidence of systemic involvement. Developed at site of IV. Tissue cultures negative. 5/17 superficial basal cell carcinoma central chest status post curettage  3/20 superficial basal cell carcinoma left medial lower leg  3/20 superficial basal cell carcinoma right medial lower leg  3/20 superficial basal cell carcinoma glabella status post Mohs 6/1/2020 (COVID)  5/20 left thenar forearm superficial basal cell carcinoma status post curettage      Seborrheic dermatitis-triamcinolone 0.025% cream or Elidel 1% cream     Family history positive for nonmelanoma skin cancer in both parents    Review of Systems:  Constitutional: Reports general sense of well-being       Past Medical History, Surgical History, Family History, Medications and Allergies reviewed.     Social History:   Social History     Socioeconomic History    Marital status:      Spouse name: Not on file    Number of children: Not on file    Years of education: Not on file    Highest education level: Not on file   Occupational History    Not on file   Tobacco Use    Smoking status: Never Smoker    Smokeless tobacco: Never Used   Vaping Use    Vaping Use: Never used   Substance and Sexual Activity    Alcohol use: Yes     Alcohol/week: 0.0 standard drinks     Comment: ONE DRINK PER WEEK    Drug use: No    Sexual activity: Not on file   Other Topics Concern    Not on file   Social History Narrative    Not on file     Social Determinants of Health     Financial Resource Strain:     Difficulty of Paying Living Expenses: Not on file   Food Insecurity:     Worried About Running Out of Food in the Last Year: Not on file    Lino of Food in the Last Year: Not on file   Transportation Needs:     Lack of Transportation (Medical): Not on file    Lack of Transportation (Non-Medical): Not on file   Physical Activity:     Days of Exercise per Week: Not on file    Minutes of Exercise per Session: Not on file   Stress:     Feeling of Stress : Not on file   Social Connections:     Frequency of Communication with Friends and Family: Not on file    Frequency of Social Gatherings with Friends and Family: Not on file    Attends Advent Services: Not on file    Active Member of [address] [place] [address] or Organizations: Not on file    Attends Club or Organization Meetings: Not on file    Marital Status: Not on file   Intimate Partner Violence:     Fear of Current or Ex-Partner: Not on file    Emotionally Abused: Not on file    Physically Abused: Not on file    Sexually Abused: Not on file   Housing Stability:     Unable to Pay for Housing in the Last Year: Not on file    Number of [place] in the Last Year: Not on file    Unstable Housing in the Last Year: Not on file       Physical Examination       -General: Well-appearing, NAD  1. Gritty erythematous papule and plaque anterior vertex and left vertex of scalp  Solar elastosis nose, no evidence of basal cell carcinoma      Assessment and Plan     1. AK (actinic keratosis) - 2-scalp lesion(s) treated w/ liquid nitrogen.  Edu re: risk of blister formation, discomfort, scar, hypopigmentation. Discussed wound care. Reviewed risk of alopecia-also discussed Efudex if she does not clear with liquid nitrogen, risk of alopecia. Likely has follicular extension, making lesions more persistent and likely to recur. Recheck 1 to 2 months sooner if needed     2. Solar elastosis-discussed chronic sun damage and monitoring carefully for change.

## 2022-04-27 DIAGNOSIS — E78.00 HYPERCHOLESTEREMIA: ICD-10-CM

## 2022-04-27 RX ORDER — ATORVASTATIN CALCIUM 20 MG/1
TABLET, FILM COATED ORAL
Qty: 90 TABLET | Refills: 0 | Status: SHIPPED | OUTPATIENT
Start: 2022-04-27 | End: 2022-07-28

## 2022-04-27 NOTE — TELEPHONE ENCOUNTER
Requested Prescriptions     Pending Prescriptions Disp Refills    atorvastatin (LIPITOR) 20 MG tablet [Pharmacy Med Name: Atorvastatin Calcium Oral Tablet 20 MG] 90 tablet 0     Sig: TAKE 1 TABLET BY MOUTH EVERY DAY     Last OV - 2/25/22  Next OV - 8/26/22  Last labs - 2/16/22

## 2022-04-29 RX ORDER — TRIAMTERENE AND HYDROCHLOROTHIAZIDE 37.5; 25 MG/1; MG/1
1 CAPSULE ORAL DAILY
Qty: 90 CAPSULE | Refills: 1 | Status: SHIPPED | OUTPATIENT
Start: 2022-04-29 | End: 2022-08-26 | Stop reason: SDUPTHER

## 2022-04-29 NOTE — TELEPHONE ENCOUNTER
Medication:   Requested Prescriptions     Pending Prescriptions Disp Refills    triamterene-hydroCHLOROthiazide (DYAZIDE) 37.5-25 MG per capsule [Pharmacy Med Name: TRIAMTERENE 37.5MG/ HCTZ 25MG CAPS] 90 capsule 0     Sig: TAKE 1 CAPSULE BY MOUTH DAILY      Last Filled:     Patient Phone Number: [phone] (home)     Last appt: 2/25/2022   Next appt: 8/26/2022    Last OARRS:   RX Monitoring 2/4/2016   Attestation The Prescription Monitoring Report for this patient was reviewed today. Periodic Controlled Substance Monitoring No signs of potential drug abuse or diversion identified.      PDMP Monitoring:    Last PDMP [facility] SYSTEM as Reviewed [facility]):  Review User Review Instant Review Result          Preferred Pharmacy:   Xavi Araujo [address] [place] [address] Moville Jarianamarcus [phone] - F [phone]  [address] [place] [address] Main [address]  Phone: [phone] Fax: [phone]    [place]yle [address] [phone] [facility] [phone]  [address] [place] [address]  Phone: [phone] Fax: [phone]

## 2022-06-08 ENCOUNTER — OFFICE VISIT (OUTPATIENT)
Dept: DERMATOLOGY | Age: 62
End: 2022-06-08
Payer: COMMERCIAL

## 2022-06-08 DIAGNOSIS — L57.0 AK (ACTINIC KERATOSIS): Primary | ICD-10-CM

## 2022-06-08 PROCEDURE — 17000 DESTRUCT PREMALG LESION: CPT | Performed by: DERMATOLOGY

## 2022-06-08 PROCEDURE — 17003 DESTRUCT PREMALG LES 2-14: CPT | Performed by: DERMATOLOGY

## 2022-07-07 ENCOUNTER — OFFICE VISIT (OUTPATIENT)
Dept: DERMATOLOGY | Age: 62
End: 2022-07-07
Payer: COMMERCIAL

## 2022-07-07 DIAGNOSIS — L57.0 AK (ACTINIC KERATOSIS): Primary | ICD-10-CM

## 2022-07-07 PROCEDURE — 17003 DESTRUCT PREMALG LES 2-14: CPT | Performed by: DERMATOLOGY

## 2022-07-07 PROCEDURE — 17000 DESTRUCT PREMALG LESION: CPT | Performed by: DERMATOLOGY

## 2022-07-07 NOTE — PROGRESS NOTES
[facility]) Dermatology  Elisha Fletcher M.D.  [phone]       Kayden Singh  1960    57 y.o. female     Date of Visit: 7/7/2022    Chief Complaint:   Chief Complaint   Patient presents with    Skin Lesion        I was asked to see this patient by Dr. Venegas ref. provider found. History of Present Illness:  1. Patient presents today for follow-up of actinic keratoses scalp. Biopsy performed 3/22 anterior vertex showed a hypertrophic actinic keratosis and left vertex showed an actinic keratosis with adjacent seborrheic keratosis. She has had treatment with liquid nitrogen twice since biopsy performed-4/22 and 6/22. Watches 6 grandchildren every day during the summer     Skin history:  History of basal cell carcinoma left chest and right forearm      12/16 Cutaneous sarcoid with no evidence of systemic involvement. Developed at site of IV. Tissue cultures negative. 5/17 superficial basal cell carcinoma central chest status post curettage  3/20 superficial basal cell carcinoma left medial lower leg  3/20 superficial basal cell carcinoma right medial lower leg  3/20 superficial basal cell carcinoma glabella status post Mohs 6/1/2020 (COVID)  5/20 left thenar forearm superficial basal cell carcinoma status post curettage      Seborrheic dermatitis-triamcinolone 0.025% cream or Elidel 1% cream     Family history positive for nonmelanoma skin cancer in both parents    Review of Systems:  Constitutional: Reports general sense of well-being       Past Medical History, Surgical History, Family History, Medications and Allergies reviewed.     Social History:   Social History     Socioeconomic History    Marital status:      Spouse name: Not on file    Number of children: Not on file    Years of education: Not on file    Highest education level: Not on file   Occupational History    Not on file   Tobacco Use    Smoking status: Never Smoker    Smokeless tobacco: Never Used   Vaping Use    Vaping Use: Never used   Substance and Sexual Activity    Alcohol use: Yes     Alcohol/week: 0.0 standard drinks     Comment: ONE DRINK PER WEEK    Drug use: No    Sexual activity: Not on file   Other Topics Concern    Not on file   Social History Narrative    Not on file     Social Determinants of Health     Financial Resource Strain:     Difficulty of Paying Living Expenses: Not on file   Food Insecurity:     Worried About Running Out of Food in the Last Year: Not on file    Lino of Food in the Last Year: Not on file   Transportation Needs:     Lack of Transportation (Medical): Not on file    Lack of Transportation (Non-Medical): Not on file   Physical Activity:     Days of Exercise per Week: Not on file    Minutes of Exercise per Session: Not on file   Stress:     Feeling of Stress : Not on file   Social Connections:     Frequency of Communication with Friends and Family: Not on file    Frequency of Social Gatherings with Friends and Family: Not on file    Attends Gnosticist Services: Not on file    Active Member of [address] [place] [address] or Organizations: Not on file    Attends Club or Organization Meetings: Not on file    Marital Status: Not on file   Intimate Partner Violence:     Fear of Current or Ex-Partner: Not on file    Emotionally Abused: Not on file    Physically Abused: Not on file    Sexually Abused: Not on file   Housing Stability:     Unable to Pay for Housing in the Last Year: Not on file    Number of [place] in the Last Year: Not on file    Unstable Housing in the Last Year: Not on file       Physical Examination       -General: Well-appearing, NAD  Scattered gritty erythematous papules mid anterior scalp and vertex of scalp-less hypertrophic than they have been in the past      Assessment and Plan     1.  AK (actinic keratosis) - After the risks, complications, and alternatives were explained to the patient, including risk of blister formation, discomfort, scar, hypopigmentation, patient elected to proceed with cryotherapy. 4 lesion(s) on the scalp were treated w/ liquid nitrogen using a Cryogun. 1 freeze thaw cycle was performed with a freeze time of 2-5 seconds. There were no immediate complications. Wound care instructions were discussed with the patient.      Return visit total-body skin exam and ongoing treatment as needed

## 2022-07-21 ENCOUNTER — OFFICE VISIT (OUTPATIENT)
Dept: SURGERY | Age: 62
End: 2022-07-21
Payer: COMMERCIAL

## 2022-07-21 VITALS — WEIGHT: 150.6 LBS | BODY MASS INDEX: 26.68 KG/M2 | SYSTOLIC BLOOD PRESSURE: 125 MMHG | DIASTOLIC BLOOD PRESSURE: 70 MMHG

## 2022-07-21 DIAGNOSIS — C18.2 CANCER OF ASCENDING COLON (HCC): Primary | ICD-10-CM

## 2022-07-21 PROCEDURE — 99213 OFFICE O/P EST LOW 20 MIN: CPT | Performed by: SURGERY

## 2022-07-21 NOTE — PROGRESS NOTES
[facility] GENERAL AND LAPAROSCOPIC SURGERY          PATIENT NAME: Shannan Crook     TODAY'S DATE: 7/21/2022     Colon cancer follow up     SUBJECTIVE:    Pt here for colon cancer follow up. No concerns, no GI issues, no N.V, no mass or hernia concern. No SOB or CP. Stable weight. OBJECTIVE:  VITALS:  Wt 150 lb 9.6 oz (68.3 kg)   BMI 26.68 kg/m²     CONSTITUTIONAL:  awake and alert  NECK: no mass or adenopathy  LUNGS:  clear to auscultation  ABDOMEN:  normal bowel sounds, soft, non-distended, non-tender, no mass or hernia     Data:      Radiology Review:  Last CT normal, no recurrence 4/22/22    PATHOLOGY 3/2016  Department of Pathology  FINAL SURGICAL PATHOLOGY REPORT  Patient Name:  Teddy Castro               Accession No:  [id number]  FINAL DIAGNOSIS:    Right colectomy:  - Moderately differentiated adenocarcinoma of ascending colon with  invasion into pericolic adipose tissue.  - Mesenteric lymph nodes (22), negative for malignancy. - Appendix, no pathologic diagnosis. - Proximal, distal, and radial resection margins, negative for  Malignancy.      ASSESSMENT AND PLAN:  S/P Lap right colectomy  6 years out, doing well, no concerns of recurrence  Continue routine follow up    25 minutes spent      Janet Macdonald MD

## 2022-07-28 DIAGNOSIS — E78.00 HYPERCHOLESTEREMIA: ICD-10-CM

## 2022-07-28 RX ORDER — ATORVASTATIN CALCIUM 20 MG/1
TABLET, FILM COATED ORAL
Qty: 90 TABLET | Refills: 0 | Status: SHIPPED | OUTPATIENT
Start: 2022-07-28

## 2022-07-28 NOTE — TELEPHONE ENCOUNTER
Medication:   Requested Prescriptions     Pending Prescriptions Disp Refills    atorvastatin (LIPITOR) 20 MG tablet [Pharmacy Med Name: Atorvastatin Calcium Oral Tablet 20 MG] 90 tablet 0     Sig: TAKE 1 TABLET BY MOUTH EVERY DAY      Provider out of office    Patient Phone Number: [phone] (home)     Last appt: 2/25/2022   Next appt: 8/26/2022    Last OARRS:   RX Monitoring 2/4/2016   Attestation The Prescription Monitoring Report for this patient was reviewed today. Periodic Controlled Substance Monitoring No signs of potential drug abuse or diversion identified.      PDMP Monitoring:    Last PDMP Whitney Howe as Reviewed [facility]):  Review User Review Instant Review Result          Preferred Pharmacy:   Van Bruno [address] [place] [address] Melania Quintana [phone] - F [phone]  [address] [facility] [address]  Phone: [phone] Fax: [phone]    Jeannette Headings [address] [phone] [facility] [phone]  [address] [place] [address]  Phone: [phone] Fax: [phone]

## 2022-07-29 RX ORDER — POTASSIUM CHLORIDE 1500 MG/1
20 TABLET, FILM COATED, EXTENDED RELEASE ORAL DAILY
Qty: 90 TABLET | Refills: 3 | Status: SHIPPED | OUTPATIENT
Start: 2022-07-29

## 2022-07-29 NOTE — TELEPHONE ENCOUNTER
Medication:   Requested Prescriptions     Pending Prescriptions Disp Refills    potassium chloride (KLOR-CON M) 20 MEQ TBCR extended release tablet [Pharmacy Med Name: POTASSIUM CHLORIDE 20MEQ ER TABLETS] 90 tablet 3     Sig: TAKE 1 TABLET BY MOUTH DAILY      Last Filled:      Patient Phone Number: [phone] (home)     Last appt: 2/25/2022   Next appt: 8/26/2022    Last OARRS:   RX Monitoring 2/4/2016   Attestation The Prescription Monitoring Report for this patient was reviewed today. Periodic Controlled Substance Monitoring No signs of potential drug abuse or diversion identified.      PDMP Monitoring:    Last PDMP Ana Barlow as Reviewed [facility]):  Review User Review Instant Review Result          Preferred Pharmacy:   Eric Whalen [address] [place] [address] [phone]  Eliezer Winn [address] [facility] [address]  Phone: [phone] Fax: [phone]

## 2022-08-08 ENCOUNTER — HOSPITAL ENCOUNTER (OUTPATIENT)
Dept: NUCLEAR MEDICINE | Age: 62
Discharge: HOME | End: 2022-08-08
Payer: COMMERCIAL

## 2022-08-08 ENCOUNTER — HOSPITAL ENCOUNTER (OUTPATIENT)
Age: 62
Discharge: HOME | End: 2022-08-08
Payer: COMMERCIAL

## 2022-08-08 DIAGNOSIS — R11.0 NAUSEA: ICD-10-CM

## 2022-08-08 LAB
ALBUMIN SERPL-MCNC: 4.3 G/DL (ref 3.4–5)
ALP BLD-CCNC: 70 U/L (ref 40–129)
ALT SERPL-CCNC: 21 U/L (ref 10–40)
AST SERPL-CCNC: 31 U/L (ref 15–37)
BILIRUB SERPL-MCNC: 0.6 MG/DL (ref 0–1)
BILIRUBIN DIRECT: <0.2 MG/DL (ref 0–0.3)
BILIRUBIN, INDIRECT: NORMAL MG/DL (ref 0–1)
TOTAL PROTEIN: 7.1 G/DL (ref 6.4–8.2)
TSH SERPL DL<=0.05 MIU/L-ACNC: 2.05 UIU/ML (ref 0.27–4.2)

## 2022-08-08 PROCEDURE — 36415 COLL VENOUS BLD VENIPUNCTURE: CPT

## 2022-08-08 PROCEDURE — A9541 TC99M SULFUR COLLOID: HCPCS | Performed by: INTERNAL MEDICINE

## 2022-08-08 PROCEDURE — 78264 GASTRIC EMPTYING IMG STUDY: CPT

## 2022-08-08 PROCEDURE — 80076 HEPATIC FUNCTION PANEL: CPT

## 2022-08-08 PROCEDURE — 3430000000 HC RX DIAGNOSTIC RADIOPHARMACEUTICAL: Performed by: INTERNAL MEDICINE

## 2022-08-08 PROCEDURE — 84443 ASSAY THYROID STIM HORMONE: CPT

## 2022-08-08 RX ADMIN — Medication 0.69 MILLICURIE: at 08:54

## 2022-08-24 LAB
BUN / CREAT RATIO: 16 (ref 12–28)
BUN BLDV-MCNC: 14 MG/DL (ref 8–27)
CALCIUM SERPL-MCNC: 10.2 MG/DL (ref 8.7–10.3)
CHLORIDE BLD-SCNC: 99 MMOL/L (ref 96–106)
CO2: 26 MMOL/L (ref 20–29)
CREAT SERPL-MCNC: 0.89 MG/DL (ref 0.57–1)
EGFR (CKD-EPI): 73 ML/MIN/1.73
GLUCOSE BLD-MCNC: 99 MG/DL (ref 65–99)
HBA1C MFR BLD: 5.7 % (ref 4.8–5.6)
POTASSIUM SERPL-SCNC: 4 MMOL/L (ref 3.5–5.2)
SODIUM BLD-SCNC: 140 MMOL/L (ref 134–144)

## 2022-08-26 ENCOUNTER — OFFICE VISIT (OUTPATIENT)
Dept: FAMILY MEDICINE CLINIC | Age: 62
End: 2022-08-26
Payer: COMMERCIAL

## 2022-08-26 VITALS
DIASTOLIC BLOOD PRESSURE: 72 MMHG | HEART RATE: 73 BPM | WEIGHT: 148.13 LBS | OXYGEN SATURATION: 97 % | RESPIRATION RATE: 12 BRPM | TEMPERATURE: 97.2 F | BODY MASS INDEX: 26.24 KG/M2 | SYSTOLIC BLOOD PRESSURE: 114 MMHG

## 2022-08-26 DIAGNOSIS — M17.11 PRIMARY OSTEOARTHRITIS OF RIGHT KNEE: ICD-10-CM

## 2022-08-26 DIAGNOSIS — K21.9 GASTROESOPHAGEAL REFLUX DISEASE WITHOUT ESOPHAGITIS: ICD-10-CM

## 2022-08-26 DIAGNOSIS — I10 ESSENTIAL HYPERTENSION: Primary | ICD-10-CM

## 2022-08-26 DIAGNOSIS — Z91.018 FOOD ALLERGY: ICD-10-CM

## 2022-08-26 PROCEDURE — 99214 OFFICE O/P EST MOD 30 MIN: CPT | Performed by: FAMILY MEDICINE

## 2022-08-26 RX ORDER — TRIAMTERENE AND HYDROCHLOROTHIAZIDE 37.5; 25 MG/1; MG/1
1 CAPSULE ORAL DAILY
Qty: 90 CAPSULE | Refills: 1 | Status: SHIPPED | OUTPATIENT
Start: 2022-08-26

## 2022-08-26 NOTE — PROGRESS NOTES
Subjective:      Patient ID: Melani Rowe is a 60 y.o. female. CC: Patient presents for re-evaluation of chronic health problems including hypertension, osteoarthritis, GERD and food intolerances. HPI Pt is here for a follow up, test refill, and stated she has been having some issues with eating. Pt has been feeling sick to her stomach on and off. Pt has been seen by gastro for this as well. She is undergone EGD with biopsy and swallowing evaluation both are normal.  She describes that often she has difficulty sometimes eating the first bite of food as it causes significant reaction. She has nausea and feeling of uneasiness. She has known lactose intolerance to some extent. She denies any issues with her bowels. She is still planning to have right knee surgery but wants to delay this till next year. No recurrence of the colon cancer.     Review of Systems  Patient Active Problem List   Diagnosis    Ulcerative colitis ([facility] [address]    Essential hypertension    Goiter    Irritable bowel syndrome    Meniere's disease    Hypercholesteremia    Cancer of ascending colon ([facility] [address]    Gastroesophageal reflux disease without esophagitis    Cutaneous sarcoidosis (HCC)    Complex tear of medial meniscus of right knee as current injury    Complex tear of lateral meniscus of right knee as current injury    Primary osteoarthritis of right knee    Allergic conjunctivitis of both eyes       Outpatient Medications Marked as Taking for the 8/26/22 encounter (Office Visit) with Holli Garrett MD   Medication Sig Dispense Refill    potassium chloride (KLOR-CON M) 20 MEQ TBCR extended release tablet TAKE 1 TABLET BY MOUTH DAILY 90 tablet 3    atorvastatin (LIPITOR) 20 MG tablet TAKE 1 TABLET BY MOUTH EVERY DAY 90 tablet 0    triamterene-hydroCHLOROthiazide (DYAZIDE) 37.5-25 MG per capsule TAKE 1 CAPSULE BY MOUTH DAILY 90 capsule 1    olopatadine (PATADAY) 0.2 % SOLN ophthalmic solution Place 1 drop into both eyes daily metroNIDAZOLE (METROGEL) 0.75 % gel Apply topically 2 times daily. 45 g 1    albuterol sulfate HFA (PROAIR HFA) 108 (90 Base) MCG/ACT inhaler Inhale 2 puffs into the lungs every 6 hours as needed for Wheezing 3 Inhaler 1    triamcinolone (KENALOG) 0.025 % cream Apply to affected area BID PRN flares 30 g 2    aspirin 325 MG tablet Take 325 mg by mouth daily      ibuprofen (ADVIL;MOTRIN) 200 MG tablet Take 200 mg by mouth every 6 hours as needed for Pain      loratadine (CLARITIN) 10 MG tablet Take 10 mg by mouth daily. Multiple Vitamins-Minerals (CENTRUM CARDIO) TABS Take  by mouth daily. psyllium 0.52 g capsule Take by mouth 2 times daily       calcium carbonate (OSCAL) 500 MG TABS tablet Take 1,200 mg by mouth daily          Allergies   Allergen Reactions    Ace Inhibitors      Lip swelling    Lisinopril Hives    Penicillins Hives    Bacitracin Rash    Morphine Nausea And Vomiting     Violently ill       Social History     Tobacco Use    Smoking status: Never    Smokeless tobacco: Never   Substance Use Topics    Alcohol use: Yes     Alcohol/week: 0.0 standard drinks     Comment: ONE DRINK PER WEEK       /72 (Site: Right Upper Arm, Position: Sitting, Cuff Size: Medium Adult)   Pulse 73   Temp 97.2 °F (36.2 °C) (Infrared)   Resp 12   Wt 148 lb 2 oz (67.2 kg)   SpO2 97%   BMI 26.24 kg/m²      Objective:   Physical Exam  Constitutional:       General: She is not in acute distress. Appearance: Normal appearance. She is well-developed. Neck:      Vascular: No carotid bruit. Cardiovascular:      Rate and Rhythm: Normal rate and regular rhythm. Pulses:           Dorsalis pedis pulses are 2+ on the right side and 2+ on the left side. Posterior tibial pulses are 2+ on the right side and 2+ on the left side. Heart sounds: Normal heart sounds. No murmur heard. No friction rub. No gallop.    Pulmonary:      Effort: Pulmonary effort is normal.      Breath sounds: Normal breath sounds. Abdominal:      General: Bowel sounds are normal. There is no distension. Palpations: Abdomen is soft. Abdomen is not rigid. There is no hepatomegaly, splenomegaly or mass. Tenderness: There is no abdominal tenderness. There is no right CVA tenderness, left CVA tenderness, guarding or rebound. Hernia: No hernia is present. Musculoskeletal:         General: No tenderness. Right knee: Swelling and deformity (Enlargement with angulation of the joint and crepitus) present. Decreased range of motion. No tenderness. No LCL laxity or MCL laxity. Normal patellar mobility. Left knee: Normal.      Right lower leg: No edema. Left lower leg: No edema. Right foot: Normal.      Left foot: Normal.   Skin:     General: Skin is warm. Findings: No rash. Neurological:      Mental Status: She is alert. Mental status is at baseline. Sensory: Sensation is intact. No sensory deficit. Motor: Motor function is intact. Deep Tendon Reflexes: Reflexes are normal and symmetric. Psychiatric:         Behavior: Behavior is cooperative. Assessment:      Miki Osgood was seen today for follow-up and hypertension. Diagnoses and all orders for this visit:    Essential hypertension  -     Comprehensive Metabolic Panel; Future  -     Lipid Panel; Future    Primary osteoarthritis of right knee    Gastroesophageal reflux disease without esophagitis    Food allergy    Other orders  -     triamterene-hydroCHLOROthiazide (DYAZIDE) 37.5-25 MG per capsule; Take 1 capsule by mouth daily          Plan:      Reviewed labs and test results with patient. Labs were available per fax. Maintain current medications. Clinically she obviously has some food allergy. She is going to keep a food diary especially when the symptoms occur. We discussed using Lactaid pills prior to any dairy products. Hopefully this can shed some light on which food groups are causing her issues.     Patient received counseling on the following healthy behaviors: nutrition and exercise     Patient given educational materials     Health maintenance updated    Discussed use, benefit, and side effects of prescribed medications. Barriers to medication compliance addressed. All patient questions answered. Pt voiced understanding. Patient needs RTC in 6 months. Medical decision making of moderate complexity. Please note that this chart was generated using Dragon dictation software. Although every effort was made to ensure the accuracy of this automated transcription, some errors in transcription may have occurred.

## 2022-08-27 PROBLEM — Z91.018 FOOD ALLERGY: Status: ACTIVE | Noted: 2022-08-27

## 2022-09-14 ENCOUNTER — OFFICE VISIT (OUTPATIENT)
Dept: DERMATOLOGY | Age: 62
End: 2022-09-14
Payer: COMMERCIAL

## 2022-09-14 DIAGNOSIS — L71.9 ROSACEA: ICD-10-CM

## 2022-09-14 DIAGNOSIS — Z85.828 HISTORY OF BASAL CELL CANCER: ICD-10-CM

## 2022-09-14 DIAGNOSIS — L57.0 ACTINIC KERATOSIS TREATED WITH TOPICAL FLUOROURACIL (5FU): ICD-10-CM

## 2022-09-14 DIAGNOSIS — D22.9 BENIGN NEVUS: ICD-10-CM

## 2022-09-14 DIAGNOSIS — L57.0 AK (ACTINIC KERATOSIS): Primary | ICD-10-CM

## 2022-09-14 PROCEDURE — 99214 OFFICE O/P EST MOD 30 MIN: CPT | Performed by: DERMATOLOGY

## 2022-09-14 PROCEDURE — 17000 DESTRUCT PREMALG LESION: CPT | Performed by: DERMATOLOGY

## 2022-09-14 PROCEDURE — 17003 DESTRUCT PREMALG LES 2-14: CPT | Performed by: DERMATOLOGY

## 2022-09-14 RX ORDER — FLUOROURACIL 50 MG/G
CREAM TOPICAL
Qty: 40 G | Refills: 0 | Status: SHIPPED | OUTPATIENT
Start: 2022-09-14

## 2022-09-14 NOTE — PROGRESS NOTES
[facility]) Dermatology  Alana Jasmine M.D.  [phone]       Lars Goncalves  1960    60 y.o. female     Date of Visit: 9/14/2022    Chief Complaint:   Chief Complaint   Patient presents with    Skin Lesion        I was asked to see this patient by Dr. Venegas ref. provider found. History of Present Illness:  1. Total-body skin exam    Actinic keratoses anterior vertex and left vertex both previously biopsied 3/22, subsequently treated sequentially with liquid nitrogen-had improved, patient states that they have recurred, now crusted. Not rapidly growing or becoming painful. Multiple nevi. Stable in size, shape, color. Has not noticed any new or changing pigmented lesions. Rosacea-does develop occasional flares on her nose. Uses MetroGel 0.75% twice daily as needed flares. Currently flaring. Does usually subside with MetroGel      Watches 6 grandchildren every day during the summer     Skin history:  History of basal cell carcinoma left chest and right forearm      12/16 Cutaneous sarcoid with no evidence of systemic involvement. Developed at site of IV. Tissue cultures negative. 5/17 superficial basal cell carcinoma central chest status post curettage  3/20 superficial basal cell carcinoma left medial lower leg  3/20 superficial basal cell carcinoma right medial lower leg  3/20 superficial basal cell carcinoma glabella status post Mohs 6/1/2020 (COVID)  5/20 left thenar forearm superficial basal cell carcinoma status post curettage      Seborrheic dermatitis-triamcinolone 0.025% cream or Elidel 1% cream    Rosacea-MetroGel 0.75%     Family history positive for nonmelanoma skin cancer in both parents    Review of Systems:  Constitutional: Reports general sense of well-being       Past Medical History, Surgical History, Family History, Medications and Allergies reviewed.     Social History:   Social History     Socioeconomic History    Marital status:      Spouse name: Not on file    Number complications. Wound care instructions were discussed with the patient. 2. Actinic keratosis treated with topical fluorouracil (5FU)-fluorouracil 5% cream twice daily for 2 weeks after crust has resolved from liquid nitrogen anterior vertex and left vertex-reviewed side effects, contraindications, proper application. Discussed strict sun avoidance. Discussed potential for alopecia-already has alopecia at site of vertex   3. Rosacea -MetroGel 0.75% twice daily as needed   4. Benign nevus - Benign acquired melanocytic nevi  -Recommend monthly self skin exams   -Educated regarding the ABCDEs of melanoma detection   -Call for any new/changing moles or concerning lesions  -Reviewed sun protective behavior -- sun avoidance during the peak hours of the day, sun-protective clothing (including hat and sunglasses), sunscreen use (water resistant, broad spectrum, SPF at least 30, need for reapplication every 2 to 3 hours), avoidance of tanning beds      5. History of basal cell cancer - No evidence of recurrence. Discussed risk of subsequent skin cancers and increased risk of melanoma. Reviewed importance of monitoring skin for change and sun protection with hats and sunscreen, as well as sun avoidance. Recheck scalp 4 to 6 weeks.   Consider rebiopsy as needed if not resolving

## 2022-09-23 ENCOUNTER — HOSPITAL ENCOUNTER (OUTPATIENT)
Dept: WOMENS IMAGING | Age: 62
Discharge: HOME | End: 2022-09-23
Payer: COMMERCIAL

## 2022-09-23 VITALS — BODY MASS INDEX: 26.22 KG/M2 | HEIGHT: 63 IN | WEIGHT: 148 LBS

## 2022-09-23 DIAGNOSIS — Z12.31 BREAST CANCER SCREENING BY MAMMOGRAM: ICD-10-CM

## 2022-09-23 PROCEDURE — 77063 BREAST TOMOSYNTHESIS BI: CPT

## 2022-10-24 ENCOUNTER — HOSPITAL ENCOUNTER (OUTPATIENT)
Dept: CT IMAGING | Age: 62
Discharge: HOME | End: 2022-10-24
Payer: COMMERCIAL

## 2022-10-24 DIAGNOSIS — C18.2 MALIGNANT NEOPLASM OF ASCENDING COLON (HCC): ICD-10-CM

## 2022-10-24 DIAGNOSIS — R97.0 ELEVATED CEA: ICD-10-CM

## 2022-10-24 PROCEDURE — 74177 CT ABD & PELVIS W/CONTRAST: CPT

## 2022-10-24 PROCEDURE — 6360000004 HC RX CONTRAST MEDICATION: Performed by: STUDENT IN AN ORGANIZED HEALTH CARE EDUCATION/TRAINING PROGRAM

## 2022-10-24 RX ADMIN — DIATRIZOATE MEGLUMINE AND DIATRIZOATE SODIUM 20 ML: 660; 100 LIQUID ORAL; RECTAL at 07:44

## 2022-10-24 RX ADMIN — IOPAMIDOL 75 ML: 755 INJECTION, SOLUTION INTRAVENOUS at 07:44

## 2022-10-31 ENCOUNTER — OFFICE VISIT (OUTPATIENT)
Dept: DERMATOLOGY | Age: 62
End: 2022-10-31
Payer: COMMERCIAL

## 2022-10-31 DIAGNOSIS — D48.5 NEOPLASM OF UNCERTAIN BEHAVIOR OF SKIN: Primary | ICD-10-CM

## 2022-10-31 PROCEDURE — 11104 PUNCH BX SKIN SINGLE LESION: CPT | Performed by: DERMATOLOGY

## 2022-10-31 PROCEDURE — 11105 PUNCH BX SKIN EA SEP/ADDL: CPT | Performed by: DERMATOLOGY

## 2022-10-31 NOTE — PROGRESS NOTES
[facility]) Dermatology  Peyton Reeves M.D.  [phone]       Amrita Able  1960    60 y.o. female     Date of Visit: 10/31/2022    Chief Complaint:   Chief Complaint   Patient presents with    Follow-up        I was asked to see this patient by Dr. Rubi montelongo. provider found. History of Present Illness:  1. Patient presents today for follow-up of vertex of scalp    Previous biopsy 3/22 showed actinic keratoses-both lesions treated with liquid nitrogen 4 times, with Efudex after the fourth treatment and follow-up today. Improvement to anterior vertex-lesion is smaller but still hypertrophic. Minimal improvement to left vertex. Watches 6 grandchildren every day during the summer     Skin history:  History of basal cell carcinoma left chest and right forearm      12/16 Cutaneous sarcoid with no evidence of systemic involvement. Developed at site of IV. Tissue cultures negative. 5/17 superficial basal cell carcinoma central chest status post curettage  3/20 superficial basal cell carcinoma left medial lower leg  3/20 superficial basal cell carcinoma right medial lower leg  3/20 superficial basal cell carcinoma glabella status post Mohs 6/1/2020 (COVID)  5/20 left thenar forearm superficial basal cell carcinoma status post curettage      Seborrheic dermatitis-triamcinolone 0.025% cream or Elidel 1% cream     Rosacea-MetroGel 0.75%     Family history positive for nonmelanoma skin cancer in both parents       Review of Systems:  Constitutional: Reports general sense of well-being       Past Medical History, Surgical History, Family History, Medications and Allergies reviewed.     Social History:   Social History     Socioeconomic History    Marital status:      Spouse name: Not on file    Number of children: Not on file    Years of education: Not on file    Highest education level: Not on file   Occupational History    Not on file   Tobacco Use    Smoking status: Never    Smokeless tobacco: Never Vaping Use    Vaping Use: Never used   Substance and Sexual Activity    Alcohol use: Yes     Alcohol/week: 0.0 standard drinks     Comment: ONE DRINK PER WEEK    Drug use: No    Sexual activity: Not on file   Other Topics Concern    Not on file   Social History Narrative    Not on file     Social Determinants of Health     Financial Resource Strain: Not on file   Food Insecurity: Not on file   Transportation Needs: Not on file   Physical Activity: Not on file   Stress: Not on file   Social Connections: Not on file   Intimate Partner Violence: Not on file   Housing Stability: Not on file       Physical Examination       -General: Well-appearing, NAD  1. Well-developed well-nourished          Anterior vertex 5 mm crusted pink papule-hypertrophic actinic keratosis rule out squamous cell carcinoma  Left vertex 2.8 cm plaque with central alopecia and erythema/crust at follicular prominence surrounding alopecia-actinic keratosis, rule out squamous cell carcinoma, also consider inflammatory conditions such as lichen plano pilaris          Assessment and Plan     1. Neoplasm of uncertain behavior of skin - -Discussed possible diagnosis. Patient agreeable to biopsy. Verbal consent obtained after risks (infection, bleeding, scar), benefits and alternatives explained. -Area(s) to be biopsied were marked with a surgical pen. Site(s) were cleansed with alcohol. Local anesthesia achieved with 1% lidocaine with epinephrine/sodium bicarbonate. 4mm punch biopsy was performed of each lesion followed by placement of simple interrupted 4-0 nylon sutures. Specimen(s) sent to pathology. The wound(s) were dressed with petrolatum and covered with a bandage.  Pt was educated re: risk of bleeding, infection, scar, wound care instructions and suture removal.      Follow-up in 2 weeks with suture removal

## 2022-11-03 LAB — DERMATOLOGY PATHOLOGY REPORT: NORMAL

## 2022-11-16 ENCOUNTER — NURSE ONLY (OUTPATIENT)
Dept: DERMATOLOGY | Age: 62
End: 2022-11-16

## 2022-11-16 DIAGNOSIS — Z48.02 VISIT FOR SUTURE REMOVAL: Primary | ICD-10-CM

## 2022-11-16 PROCEDURE — 99024 POSTOP FOLLOW-UP VISIT: CPT | Performed by: DERMATOLOGY

## 2022-11-16 NOTE — PROGRESS NOTES
Patient presents for suture removal. The wound is well healed without signs of infection, dry scab clumps in suture areas. The sutures are removed. Wound care and activity instructions given. Return prn.

## 2022-12-05 ENCOUNTER — TELEPHONE (OUTPATIENT)
Dept: FAMILY MEDICINE CLINIC | Age: 62
End: 2022-12-05

## 2022-12-05 RX ORDER — OSELTAMIVIR PHOSPHATE 75 MG/1
75 CAPSULE ORAL 2 TIMES DAILY
Qty: 10 CAPSULE | Refills: 0 | Status: SHIPPED | OUTPATIENT
Start: 2022-12-05 | End: 2022-12-10

## 2022-12-05 NOTE — TELEPHONE ENCOUNTER
Pt care of her granddaughter over the weekend- granddaughter tested positive for  flu- Pt would like Tamiflu prescribed.      [facility] [address] [place] [address] [place] [phone] - F [phone]   Eliezer Winn [address] [place] [address], [address] [place] [address]   Phone:  [phone]  Fax:  [phone]

## 2023-01-04 ENCOUNTER — OFFICE VISIT (OUTPATIENT)
Dept: DERMATOLOGY | Age: 63
End: 2023-01-04

## 2023-01-04 DIAGNOSIS — L66.9 ALOPECIA, SCARRING: Primary | ICD-10-CM

## 2023-01-04 DIAGNOSIS — L21.9 SEBORRHEIC DERMATITIS: ICD-10-CM

## 2023-01-04 RX ORDER — CLOBETASOL PROPIONATE 0.46 MG/ML
SOLUTION TOPICAL
Qty: 50 ML | Refills: 2 | Status: SHIPPED | OUTPATIENT
Start: 2023-01-04

## 2023-01-04 NOTE — PROGRESS NOTES
[facility]) Dermatology  Marjan Kapadia M.D.  [phone]       Robe Number  [id number]    60 y.o. female     Date of Visit: 1/4/2023    Chief Complaint:   Chief Complaint   Patient presents with    Skin Lesion        I was asked to see this patient by Dr. Venegas ref. provider found. History of Present Illness:  1. 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 Also had changes of seborrheic dermatitis. Patient does note scaly papules left vertex and anterior vertex. Dry. Notes a change in her hair growth in those areas. Small area of hair loss. Watches 6 grandchildren every day during the summer     Skin history:  History of basal cell carcinoma left chest and right forearm      12/16 Cutaneous sarcoid with no evidence of systemic involvement. Developed at site of IV. Tissue cultures negative. 5/17 superficial basal cell carcinoma central chest status post curettage  3/20 superficial basal cell carcinoma left medial lower leg  3/20 superficial basal cell carcinoma right medial lower leg  3/20 superficial basal cell carcinoma glabella status post Mohs 6/1/2020 (COVID)  5/20 left thenar forearm superficial basal cell carcinoma status post curettage      Seborrheic dermatitis-triamcinolone 0.025% cream or Elidel 1% cream     Rosacea-MetroGel 0.75%     Family history positive for nonmelanoma skin cancer in both parents  Review of Systems:  Constitutional: Reports general sense of well-being       Past Medical History, Surgical History, Family History, Medications and Allergies reviewed.     Social History:   Social History     Socioeconomic History    Marital status:      Spouse name: Not on file    Number of children: Not on file    Years of education: Not on file    Highest education level: Not on file   Occupational History    Not on file Tobacco Use    Smoking status: Never    Smokeless tobacco: Never   Vaping Use    Vaping Use: Never used   Substance and Sexual Activity    Alcohol use: Yes     Alcohol/week: 0.0 standard drinks     Comment: ONE DRINK PER WEEK    Drug use: No    Sexual activity: Not on file   Other Topics Concern    Not on file   Social History Narrative    Not on file     Social Determinants of Health     Financial Resource Strain: Not on file   Food Insecurity: Not on file   Transportation Needs: Not on file   Physical Activity: Not on file   Stress: Not on file   Social Connections: Not on file   Intimate Partner Violence: Not on file   Housing Stability: Not on file       Physical Examination       -General: Well-appearing, NAD  1. Well-developed well-nourished  Scalp with well-healed biopsy sites. 2 discrete erythematous crusted papules anterior vertex. Left vertex with central alopecia, surrounding scaly papules follicularly centered. Assessment and Plan     1. Alopecia, scarring - -IL kenalog 5 mg/ml (0.8 mL, 0.4 1% lidocaine with epinephrine and 0.4 of Kenalog 10 mg/cc); total .8 ml to 3 lesion(s). Edu re: atrophy, dyspigmentation. 2. Seborrheic dermatitis -clobetasol solution twice daily-reviewed proper use and side effects   Recheck 6 to 8 weeks.   Total-body skin exam April

## 2023-01-22 DIAGNOSIS — E78.00 HYPERCHOLESTEREMIA: ICD-10-CM

## 2023-01-23 RX ORDER — ATORVASTATIN CALCIUM 20 MG/1
TABLET, FILM COATED ORAL
Qty: 90 TABLET | Refills: 0 | Status: SHIPPED | OUTPATIENT
Start: 2023-01-23

## 2023-01-23 NOTE — TELEPHONE ENCOUNTER
Medication:   Requested Prescriptions     Pending Prescriptions Disp Refills    atorvastatin (LIPITOR) 20 MG tablet [Pharmacy Med Name: Atorvastatin Calcium Oral Tablet 20 MG] 90 tablet 0     Sig: TAKE 1 TABLET BY MOUTH EVERY DAY          Patient Phone Number: [phone] (home)     Last appt: 8/26/2022   Next appt: 2/27/2023    Last OARRS:   RX Monitoring 2/4/2016   Attestation The Prescription Monitoring Report for this patient was reviewed today. Periodic Controlled Substance Monitoring No signs of potential drug abuse or diversion identified.      PDMP Monitoring:    Last PDMP Luigi Crews as Reviewed [facility]):  Review User Review Instant Review Result          Preferred Pharmacy:   [facility] [address] [place] [address] Melania Quintana  [phone] - F [phone]  [facility]0 [facility] [address]  Phone: [phone] Fax: [phone]    Shaylee Jauregui [address] [place] [address] [phone] Laurette Cheadle [phone]  [address] [place] [address]  Phone: [phone] Fax: [phone]

## 2023-02-13 ENCOUNTER — OFFICE VISIT (OUTPATIENT)
Dept: DERMATOLOGY | Age: 63
End: 2023-02-13
Payer: COMMERCIAL

## 2023-02-13 DIAGNOSIS — L66.9 ALOPECIA, SCARRING: Primary | ICD-10-CM

## 2023-02-13 PROCEDURE — 11900 INJECT SKIN LESIONS </W 7: CPT | Performed by: DERMATOLOGY

## 2023-02-13 NOTE — PROGRESS NOTES
[facility]) Dermatology  Yung Gray M.D.  [phone]       Lucille Edward  1960    60 y.o. female     Date of Visit: 2/13/2023    Chief Complaint:   Chief Complaint   Patient presents with    Skin Lesion        I was asked to see this patient by Dr. Venegas ref. provider found. History of Present Illness:  1. Patient presents today for follow-up of a scarring alopecia vertex and anterior scalp. Has been stable since her last visit-continues to develop crust at periphery of alopecia vertex of scalp. Is using clobetasol solution twice daily 2 weeks on followed by 2 weeks off      Watches 6 grandchildren every day during the summer     Skin history:  History of basal cell carcinoma left chest and right forearm      12/16 Cutaneous sarcoid with no evidence of systemic involvement. Developed at site of IV. Tissue cultures negative. 5/17 superficial basal cell carcinoma central chest status post curettage  3/20 superficial basal cell carcinoma left medial lower leg  3/20 superficial basal cell carcinoma right medial lower leg  3/20 superficial basal cell carcinoma glabella status post Mohs 6/1/2020 (COVID)  5/20 left thenar forearm superficial basal cell carcinoma status post curettage      Seborrheic dermatitis-triamcinolone 0.025% cream or Elidel 1% cream     Rosacea-MetroGel 0.75%     Family history positive for nonmelanoma skin cancer in both parents    Review of Systems:  Constitutional: Reports general sense of well-being       Past Medical History, Surgical History, Family History, Medications and Allergies reviewed.     Social History:   Social History     Socioeconomic History    Marital status:      Spouse name: Not on file    Number of children: Not on file    Years of education: Not on file    Highest education level: Not on file   Occupational History    Not on file   Tobacco Use    Smoking status: Never    Smokeless tobacco: Never   Vaping Use    Vaping Use: Never used   Substance and Sexual Activity    Alcohol use: Yes     Alcohol/week: 0.0 standard drinks     Comment: ONE DRINK PER WEEK    Drug use: No    Sexual activity: Not on file   Other Topics Concern    Not on file   Social History Narrative    Not on file     Social Determinants of Health     Financial Resource Strain: Not on file   Food Insecurity: Not on file   Transportation Needs: Not on file   Physical Activity: Not on file   Stress: Not on file   Social Connections: Not on file   Intimate Partner Violence: Not on file   Housing Stability: Not on file       Physical Examination       -General: Well-appearing, NAD  1. Alopecia with follicularly centered erythematous crusted papules at periphery on vertex and anterior of scalp      Assessment and Plan     1. Alopecia, scarring  -IL kenalog 5 mg/ml (0.8 mL, 0.4 1% lidocaine with epinephrine and 0.4 of Kenalog 10 mg/cc); total .8 ml to 3 lesion(s). Edu re: atrophy, dyspigmentation.      Clobetasol solution twice daily continuously for 1 month-recheck 1 month with ongoing treatment

## 2023-02-20 ENCOUNTER — TELEPHONE (OUTPATIENT)
Dept: DERMATOLOGY | Age: 63
End: 2023-02-20

## 2023-02-20 NOTE — TELEPHONE ENCOUNTER
Returned call to pt  Pt c/b [phone]  Pt states:   - she thought Dr. Gurdeep Montaño was going to send medication into her pharm    Looking back on ov note it just states clobetasol solution twice daily continuously for 1 month-recheck 1 month with ongoing treatment    Pt states understanding

## 2023-02-21 LAB
A/G RATIO: 1.7 (ref 1.2–2.2)
ALBUMIN SERPL-MCNC: 4.1 G/DL (ref 3.8–4.8)
ALP BLD-CCNC: 65 IU/L (ref 44–121)
ALT SERPL-CCNC: 18 IU/L (ref 0–32)
AST SERPL-CCNC: 33 IU/L (ref 0–40)
BILIRUB SERPL-MCNC: 0.5 MG/DL (ref 0–1.2)
BUN / CREAT RATIO: 18 (ref 12–28)
BUN BLDV-MCNC: 19 MG/DL (ref 8–27)
CALCIUM SERPL-MCNC: 9.7 MG/DL (ref 8.7–10.3)
CHLORIDE BLD-SCNC: 104 MMOL/L (ref 96–106)
CHOLESTEROL, TOTAL: 149 MG/DL (ref 100–199)
CO2: 23 MMOL/L (ref 20–29)
COMMENT: ABNORMAL
CREAT SERPL-MCNC: 1.03 MG/DL (ref 0.57–1)
ESTIMATED GLOMERULAR FILTRATION RATE CREATININE EQUATION: 61 ML/MIN/1.73
GLOBULIN: 2.4 G/DL (ref 1.5–4.5)
GLUCOSE BLD-MCNC: 90 MG/DL (ref 70–99)
HDLC SERPL-MCNC: 45 MG/DL
LDL CHOLESTEROL CALCULATED: 71 MG/DL (ref 0–99)
POTASSIUM SERPL-SCNC: 3.7 MMOL/L (ref 3.5–5.2)
SODIUM BLD-SCNC: 142 MMOL/L (ref 134–144)
TOTAL PROTEIN: 6.5 G/DL (ref 6–8.5)
TRIGL SERPL-MCNC: 201 MG/DL (ref 0–149)
VLDLC SERPL CALC-MCNC: 33 MG/DL (ref 5–40)

## 2023-02-26 SDOH — ECONOMIC STABILITY: INCOME INSECURITY: HOW HARD IS IT FOR YOU TO PAY FOR THE VERY BASICS LIKE FOOD, HOUSING, MEDICAL CARE, AND HEATING?: NOT HARD AT ALL

## 2023-02-26 SDOH — ECONOMIC STABILITY: FOOD INSECURITY: WITHIN THE PAST 12 MONTHS, THE FOOD YOU BOUGHT JUST DIDN'T LAST AND YOU DIDN'T HAVE MONEY TO GET MORE.: NEVER TRUE

## 2023-02-26 SDOH — ECONOMIC STABILITY: HOUSING INSECURITY
IN THE LAST 12 MONTHS, WAS THERE A TIME WHEN YOU DID NOT HAVE A STEADY PLACE TO SLEEP OR SLEPT IN A SHELTER (INCLUDING NOW)?: NO

## 2023-02-26 SDOH — ECONOMIC STABILITY: TRANSPORTATION INSECURITY
IN THE PAST 12 MONTHS, HAS LACK OF TRANSPORTATION KEPT YOU FROM MEETINGS, WORK, OR FROM GETTING THINGS NEEDED FOR DAILY LIVING?: NO

## 2023-02-26 SDOH — ECONOMIC STABILITY: FOOD INSECURITY: WITHIN THE PAST 12 MONTHS, YOU WORRIED THAT YOUR FOOD WOULD RUN OUT BEFORE YOU GOT MONEY TO BUY MORE.: NEVER TRUE

## 2023-02-27 ENCOUNTER — OFFICE VISIT (OUTPATIENT)
Dept: FAMILY MEDICINE CLINIC | Age: 63
End: 2023-02-27
Payer: COMMERCIAL

## 2023-02-27 VITALS
HEART RATE: 89 BPM | TEMPERATURE: 97.3 F | WEIGHT: 151.5 LBS | SYSTOLIC BLOOD PRESSURE: 112 MMHG | RESPIRATION RATE: 12 BRPM | DIASTOLIC BLOOD PRESSURE: 70 MMHG | OXYGEN SATURATION: 99 % | BODY MASS INDEX: 26.84 KG/M2

## 2023-02-27 DIAGNOSIS — C18.2 CANCER OF ASCENDING COLON (HCC): ICD-10-CM

## 2023-02-27 DIAGNOSIS — M17.11 PRIMARY OSTEOARTHRITIS OF RIGHT KNEE: ICD-10-CM

## 2023-02-27 DIAGNOSIS — R73.9 HYPERGLYCEMIA: ICD-10-CM

## 2023-02-27 DIAGNOSIS — I10 ESSENTIAL HYPERTENSION: Primary | ICD-10-CM

## 2023-02-27 DIAGNOSIS — E78.00 HYPERCHOLESTEREMIA: ICD-10-CM

## 2023-02-27 PROBLEM — S83.271A COMPLEX TEAR OF LATERAL MENISCUS OF RIGHT KNEE AS CURRENT INJURY: Status: RESOLVED | Noted: 2019-03-04 | Resolved: 2023-02-27

## 2023-02-27 PROCEDURE — 3074F SYST BP LT 130 MM HG: CPT | Performed by: FAMILY MEDICINE

## 2023-02-27 PROCEDURE — 99214 OFFICE O/P EST MOD 30 MIN: CPT | Performed by: FAMILY MEDICINE

## 2023-02-27 PROCEDURE — 3078F DIAST BP <80 MM HG: CPT | Performed by: FAMILY MEDICINE

## 2023-02-27 RX ORDER — TRIAMTERENE AND HYDROCHLOROTHIAZIDE 37.5; 25 MG/1; MG/1
1 CAPSULE ORAL DAILY
Qty: 90 CAPSULE | Refills: 1 | Status: SHIPPED | OUTPATIENT
Start: 2023-02-27

## 2023-02-27 SDOH — ECONOMIC STABILITY: FOOD INSECURITY: WITHIN THE PAST 12 MONTHS, THE FOOD YOU BOUGHT JUST DIDN'T LAST AND YOU DIDN'T HAVE MONEY TO GET MORE.: NEVER TRUE

## 2023-02-27 SDOH — ECONOMIC STABILITY: INCOME INSECURITY: HOW HARD IS IT FOR YOU TO PAY FOR THE VERY BASICS LIKE FOOD, HOUSING, MEDICAL CARE, AND HEATING?: NOT HARD AT ALL

## 2023-02-27 SDOH — ECONOMIC STABILITY: FOOD INSECURITY: WITHIN THE PAST 12 MONTHS, YOU WORRIED THAT YOUR FOOD WOULD RUN OUT BEFORE YOU GOT MONEY TO BUY MORE.: NEVER TRUE

## 2023-02-27 ASSESSMENT — PATIENT HEALTH QUESTIONNAIRE - PHQ9
1. LITTLE INTEREST OR PLEASURE IN DOING THINGS: 0
SUM OF ALL RESPONSES TO PHQ QUESTIONS 1-9: 0
SUM OF ALL RESPONSES TO PHQ9 QUESTIONS 1 & 2: 0
2. FEELING DOWN, DEPRESSED OR HOPELESS: 0
SUM OF ALL RESPONSES TO PHQ QUESTIONS 1-9: 0

## 2023-02-27 NOTE — PROGRESS NOTES
Subjective:      Patient ID: Dina Mathias is a 60 y.o. female. CC: Patient presents for re-evaluation of chronic health problems including hypertension, hyperglycemia hip, history of colon cancer, osteoarthritis of right knee and hypercholesterol. HPI pt is here for a follow up, med refill, test results. Patient says frustrating problems dermatology wise but otherwise feels she is doing well. She plans to have the knee surgery in October when her grandson returns to school. She had skin lesions on her scalp that have been biopsied and unfortunately continued to be requiring frequent treatments. She is seeing ENT specialist at [facility] at this time. She has been having intermittent episodes of losing her hearing and it was found recently have a sinus infection.     Review of Systems  Patient Active Problem List   Diagnosis    Ulcerative colitis ([facility]    Essential hypertension    Goiter    Irritable bowel syndrome    Meniere's disease    Hypercholesteremia    Cancer of ascending colon ([facility]    Gastroesophageal reflux disease without esophagitis    Cutaneous sarcoidosis (HCC)    Complex tear of medial meniscus of right knee as current injury    Complex tear of lateral meniscus of right knee as current injury    Primary osteoarthritis of right knee    Allergic conjunctivitis of both eyes    Food allergy       Outpatient Medications Marked as Taking for the 2/27/23 encounter (Office Visit) with Karen Devine MD   Medication Sig Dispense Refill    atorvastatin (LIPITOR) 20 MG tablet TAKE 1 TABLET BY MOUTH EVERY DAY 90 tablet 0    clobetasol (TEMOVATE) 0.05 % external solution Apply BID for up to 2 weeks 50 mL 2    triamterene-hydroCHLOROthiazide (DYAZIDE) 37.5-25 MG per capsule Take 1 capsule by mouth daily 90 capsule 1    potassium chloride (KLOR-CON M) 20 MEQ TBCR extended release tablet TAKE 1 TABLET BY MOUTH DAILY 90 tablet 3    olopatadine (PATADAY) 0.2 % SOLN ophthalmic solution Place 1 drop into both eyes daily      metroNIDAZOLE (METROGEL) 0.75 % gel Apply topically 2 times daily. 45 g 1    albuterol sulfate HFA (PROAIR HFA) 108 (90 Base) MCG/ACT inhaler Inhale 2 puffs into the lungs every 6 hours as needed for Wheezing 3 Inhaler 1    triamcinolone (KENALOG) 0.025 % cream Apply to affected area BID PRN flares 30 g 2    aspirin 325 MG tablet Take 325 mg by mouth daily      ibuprofen (ADVIL;MOTRIN) 200 MG tablet Take 200 mg by mouth every 6 hours as needed for Pain      loratadine (CLARITIN) 10 MG tablet Take 10 mg by mouth daily. Multiple Vitamins-Minerals (CENTRUM CARDIO) TABS Take  by mouth daily. psyllium 0.52 g capsule Take by mouth 2 times daily       calcium carbonate (OSCAL) 500 MG TABS tablet Take 1,200 mg by mouth daily          Allergies   Allergen Reactions    Ace Inhibitors      Lip swelling    Lisinopril Hives    Penicillins Hives    Bacitracin Rash    Morphine Nausea And Vomiting     Violently ill       Social History     Tobacco Use    Smoking status: Never    Smokeless tobacco: Never   Substance Use Topics    Alcohol use: Yes     Alcohol/week: 0.0 standard drinks     Comment: ONE DRINK PER WEEK       /70 (Site: Right Upper Arm, Position: Sitting, Cuff Size: Medium Adult)   Pulse 89   Temp 97.3 °F (36.3 °C) (Infrared)   Resp 12   Wt 151 lb 8 oz (68.7 kg)   SpO2 99%   BMI 26.84 kg/m²      Objective:   Physical Exam  Constitutional:       General: She is not in acute distress. Appearance: She is well-developed. Neck:      Vascular: No carotid bruit. Cardiovascular:      Rate and Rhythm: Normal rate and regular rhythm. Pulses:           Dorsalis pedis pulses are 2+ on the right side and 2+ on the left side. Posterior tibial pulses are 2+ on the right side and 2+ on the left side. Heart sounds: Normal heart sounds. No murmur heard. No friction rub. No gallop.    Pulmonary:      Effort: Pulmonary effort is normal.      Breath sounds: Normal breath sounds. Musculoskeletal:         General: No tenderness. Right knee: Swelling and deformity (Enlargement with angulation of the joint and crepitus) present. Decreased range of motion. No tenderness. No LCL laxity or MCL laxity. Normal patellar mobility. Left knee: Normal.      Right lower leg: No edema. Left lower leg: No edema. Right foot: Normal.      Left foot: Normal.   Neurological:      Mental Status: She is alert and oriented to person, place, and time. Sensory: Sensation is intact. No sensory deficit. Motor: Motor function is intact. Deep Tendon Reflexes: Reflexes are normal and symmetric. Psychiatric:         Behavior: Behavior is cooperative. Assessment:      Nieves Ventura was seen today for follow-up, hypertension and hyperlipidemia. Diagnoses and all orders for this visit:    Essential hypertension  -     Basic Metabolic Panel; Future    Hyperglycemia  -     Basic Metabolic Panel; Future  -     Hemoglobin A1C; Future    Cancer of ascending colon (HCC)    Primary osteoarthritis of right knee    Hypercholesteremia    Other orders  -     triamterene-hydroCHLOROthiazide (DYAZIDE) 37.5-25 MG per capsule; Take 1 capsule by mouth daily          Plan:      Reviewed labs and test results with patient. Maintain current medications for blood pressure management. Continue with ENT consultation regards to recurrent right ear hearing loss    Patient continues to be a colon cancer survivor with no recurrences from 2016. Continue with cholesterol management    Patient received counseling on the following healthy behaviors: nutrition and exercise     Patient given educational materials     Health maintenance updated    Discussed use, benefit, and side effects of prescribed medications. Barriers to medication compliance addressed. All patient questions answered. Pt voiced understanding.      Patient needs RTC in 6 months for recheck of chronic health issues and preop clearance. Medical decision making of moderate complexity. Please note that this chart was generated using Dragon dictation software. Although every effort was made to ensure the accuracy of this automated transcription, some errors in transcription may have occurred.

## 2023-03-09 ENCOUNTER — TELEPHONE (OUTPATIENT)
Dept: FAMILY MEDICINE CLINIC | Age: 63
End: 2023-03-09

## 2023-03-09 DIAGNOSIS — H10.33 ACUTE CONJUNCTIVITIS OF BOTH EYES, UNSPECIFIED ACUTE CONJUNCTIVITIS TYPE: Primary | ICD-10-CM

## 2023-03-09 RX ORDER — KETOTIFEN FUMARATE 0.35 MG/ML
1 SOLUTION/ DROPS OPHTHALMIC 2 TIMES DAILY
Qty: 1 ML | Refills: 0 | Status: SHIPPED | OUTPATIENT
Start: 2023-03-09 | End: 2023-03-19

## 2023-03-09 NOTE — TELEPHONE ENCOUNTER
Left detailed message advising that prescription was sent to the pharmacy and to call with any questions or concerns.

## 2023-03-09 NOTE — TELEPHONE ENCOUNTER
Pt has pink eye and wants to know if you can call something in for this.  Woke up with eyes crusted shut    Holley Dyer [address] [place] [address], [address] [place] [address] [phone] - F [address] [facility] [address], [address] [place] [id number]   Phone:  [phone]  Fax:  [phone]

## 2023-03-09 NOTE — TELEPHONE ENCOUNTER
Patient had an eye drop sent in today for pink eye, she went to the pharmacy to pick it up and they told her it was an antihistamine drop. She is worried this won't help with her symptoms. Please advise.

## 2023-03-10 RX ORDER — GENTAMICIN SULFATE 3 MG/ML
2 SOLUTION/ DROPS OPHTHALMIC 3 TIMES DAILY
Qty: 5 ML | Refills: 0 | Status: SHIPPED | OUTPATIENT
Start: 2023-03-10 | End: 2023-03-15

## 2023-03-10 NOTE — TELEPHONE ENCOUNTER
Discharge/swollen/matted- Left eye.  started a couple of days ago. Grandchildren all have it.   Would like a call when sent in

## 2023-03-10 NOTE — TELEPHONE ENCOUNTER
When I symptoms that she having? If she is having matting and discharge then we should start her on an antibiotic eye medication.

## 2023-04-19 ENCOUNTER — OFFICE VISIT (OUTPATIENT)
Dept: DERMATOLOGY | Age: 63
End: 2023-04-19
Payer: COMMERCIAL

## 2023-04-19 DIAGNOSIS — L57.0 AK (ACTINIC KERATOSIS): Primary | ICD-10-CM

## 2023-04-19 DIAGNOSIS — Z85.828 HISTORY OF BASAL CELL CANCER: ICD-10-CM

## 2023-04-19 DIAGNOSIS — L66.9 ALOPECIA, SCARRING: ICD-10-CM

## 2023-04-19 DIAGNOSIS — D48.5 NEOPLASM OF UNCERTAIN BEHAVIOR OF SKIN: ICD-10-CM

## 2023-04-19 DIAGNOSIS — D22.9 BENIGN NEVUS: ICD-10-CM

## 2023-04-19 DIAGNOSIS — L82.1 SEBORRHEIC KERATOSES: ICD-10-CM

## 2023-04-19 PROCEDURE — 11102 TANGNTL BX SKIN SINGLE LES: CPT | Performed by: DERMATOLOGY

## 2023-04-19 PROCEDURE — 17000 DESTRUCT PREMALG LESION: CPT | Performed by: DERMATOLOGY

## 2023-04-19 PROCEDURE — 11900 INJECT SKIN LESIONS </W 7: CPT | Performed by: DERMATOLOGY

## 2023-04-19 PROCEDURE — 99213 OFFICE O/P EST LOW 20 MIN: CPT | Performed by: DERMATOLOGY

## 2023-04-19 NOTE — PROGRESS NOTES
[facility]) Dermatology  Florencio Brown M.D.  [phone]       Blanca Bello  1960    60 y.o. female     Date of Visit: 4/19/2023    Chief Complaint:   Chief Complaint   Patient presents with    Skin Lesion        I was asked to see this patient by Dr. Venegas ref. provider found. History of Present Illness:  1. TBSE    Multiple nevi. Patient has not noticed any new or changing pigmented lesions. Stable in size, shape, color. Not itching, bleeding. Seborrheic keratoses. Increasing number of hyperkeratotic stuck on papules and plaques over the torso. No discrete lesion itching, bleeding, becoming symptomatic. Patient with a history of scarring alopecia vertex and anterior scalp. Treated previously with intralesional Kenalog. Lesion anterior scalp has resolved. Continues to have crusted papules at periphery of alopecia left vertex of scalp. Watches 6 grandchildren every day during the summer     Skin history:  History of basal cell carcinoma left chest and right forearm      12/16 Cutaneous sarcoid with no evidence of systemic involvement. Developed at site of IV. Tissue cultures negative. 5/17 superficial basal cell carcinoma central chest status post curettage  3/20 superficial basal cell carcinoma left medial lower leg  3/20 superficial basal cell carcinoma right medial lower leg  3/20 superficial basal cell carcinoma glabella status post Mohs 6/1/2020 (COVID)  5/20 left thenar forearm superficial basal cell carcinoma status post curettage      Seborrheic dermatitis-triamcinolone 0.025% cream or Elidel 1% cream     Rosacea-MetroGel 0.75%     Family history positive for nonmelanoma skin cancer in both parents       Review of Systems:  Constitutional: Reports general sense of well-being       Past Medical History, Surgical History, Family History, Medications and Allergies reviewed.     Social History:   Social History     Socioeconomic History    Marital status:      Spouse name: Not

## 2023-04-22 DIAGNOSIS — E78.00 HYPERCHOLESTEREMIA: ICD-10-CM

## 2023-04-24 LAB — DERMATOLOGY PATHOLOGY REPORT: NORMAL

## 2023-04-24 RX ORDER — TRIAMTERENE AND HYDROCHLOROTHIAZIDE 37.5; 25 MG/1; MG/1
1 CAPSULE ORAL DAILY
Qty: 90 CAPSULE | Refills: 2 | Status: SHIPPED | OUTPATIENT
Start: 2023-04-24

## 2023-04-24 RX ORDER — ATORVASTATIN CALCIUM 20 MG/1
TABLET, FILM COATED ORAL
Qty: 90 TABLET | Refills: 2 | Status: SHIPPED | OUTPATIENT
Start: 2023-04-24

## 2023-04-24 NOTE — TELEPHONE ENCOUNTER
Medication:   Requested Prescriptions     Pending Prescriptions Disp Refills    atorvastatin (LIPITOR) 20 MG tablet [Pharmacy Med Name: Atorvastatin Calcium Oral Tablet 20 MG] 90 tablet 0     Sig: TAKE 1 TABLET BY MOUTH EVERY DAY      Last Filled:      Patient Phone Number: [phone] (home)     Last appt: 2/27/2023   Next appt: 9/28/2023    Last OARRS:   RX Monitoring 2/4/2016   Attestation The Prescription Monitoring Report for this patient was reviewed today. Periodic Controlled Substance Monitoring No signs of potential drug abuse or diversion identified.      PDMP Monitoring:    Last PDMP Demarc[facility] Parents as Reviewed [facility]):  Review User Review Instant Review Result          Preferred Pharmacy:   [address] Melania Quintana [phone] - F [phone]  [address] [address]  Phone: [phone] Fax: [phone]    Mike Shipman [address] [place] [address] [phone] Maricel Chou [phone]  [address] [place] [address]  Phone: [phone] Fax: [phone]

## 2023-04-24 NOTE — TELEPHONE ENCOUNTER
Medication:   Requested Prescriptions     Pending Prescriptions Disp Refills    triamterene-hydroCHLOROthiazide (DYAZIDE) 37.5-25 MG per capsule [Pharmacy Med Name: TRIAMTERENE 37.5MG/ HCTZ 25MG CAPS] 90 capsule 2     Sig: TAKE 1 CAPSULE BY MOUTH DAILY      Last Filled:      Patient Phone Number: [phone] (home)     Last appt: 2/27/2023   Next appt: 9/28/2023    Last OARRS:   RX Monitoring 2/4/2016   Attestation The Prescription Monitoring Report for this patient was reviewed today. Periodic Controlled Substance Monitoring No signs of potential drug abuse or diversion identified.      PDMP Monitoring:    Last PDMP Nadia Gross as Reviewed [facility]):  Review User Review Instant Review Result          Preferred Pharmacy:   Delores eDan [address] [place] [address] Melania Victoriaja [phone] - F [phone]  [address] [address]  Phone: [phone] Fax: [phone]    [address] [phone] Josiah Gilford [phone]  [address] [place] [address]  Phone: [phone] Fax: [phone]

## 2023-05-25 ENCOUNTER — OFFICE VISIT (OUTPATIENT)
Dept: DERMATOLOGY | Age: 63
End: 2023-05-25

## 2023-05-25 DIAGNOSIS — L66.9 ALOPECIA, SCARRING: Primary | ICD-10-CM

## 2023-05-25 NOTE — PROGRESS NOTES
[facility]) Dermatology  Marlin Coles M.D.  [phone]       Adenike Tavares  1960    60 y.o. female     Date of Visit: 5/25/2023    Chief Complaint:   Chief Complaint   Patient presents with    Skin Lesion        I was asked to see this patient by Dr. Venegas ref. provider found. History of Present Illness:  1. Patient presents today for follow-up of scarring alopecia. Has had good improvement to lesion left vertex of scalp-2 new smaller papules mid anterior scalp have developed-follicularly centered crust but no alopecia yet. 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 Developed at site of IV. Tissue cultures negative. 5/17 superficial basal cell carcinoma central chest status post curettage  3/20 superficial basal cell carcinoma left medial lower leg  3/20 superficial basal cell carcinoma right medial lower leg  3/20 superficial basal cell carcinoma glabella status post Mohs 6/1/2020 (COVID)  5/20 left thenar forearm superficial basal cell carcinoma status post curettage      Seborrheic dermatitis-triamcinolone 0.025% cream or Elidel 1% cream     Rosacea-MetroGel 0.75%     Family history positive for nonmelanoma skin cancer in both parents    Review of Systems:  Constitutional: Reports general sense of well-being       Past Medical History, Surgical History, Family History, Medications and Allergies reviewed.     Social History:   Social History     Socioeconomic History    Marital status:      Spouse name: Not on file    Number of children: Not on file    Years of education: Not on file    Highest education level: Not on file   Occupational History    Not on file   Tobacco Use    Smoking status: Never    Smokeless tobacco: Never   Vaping Use    Vaping Use:

## 2023-06-05 ENCOUNTER — TELEPHONE (OUTPATIENT)
Dept: FAMILY MEDICINE CLINIC | Age: 63
End: 2023-06-05

## 2023-06-05 RX ORDER — CEPHALEXIN 500 MG/1
500 CAPSULE ORAL 3 TIMES DAILY
Qty: 21 CAPSULE | Refills: 0 | Status: SHIPPED | OUTPATIENT
Start: 2023-06-05 | End: 2023-06-12

## 2023-06-05 NOTE — TELEPHONE ENCOUNTER
2 of pts grandchildren that she watches have strep. She is now  having sore throat and headache. She is wanting to know if you can call in an ATB for her. She would come in, but has a lot going on right now, her father passed away over the weekend. Please advise.     [place] Eloisa Dorman

## 2023-06-28 ENCOUNTER — OFFICE VISIT (OUTPATIENT)
Dept: DERMATOLOGY | Age: 63
End: 2023-06-28
Payer: COMMERCIAL

## 2023-06-28 DIAGNOSIS — L66.9 ALOPECIA, SCARRING: Primary | ICD-10-CM

## 2023-06-28 PROCEDURE — 11900 INJECT SKIN LESIONS </W 7: CPT | Performed by: DERMATOLOGY

## 2023-07-21 RX ORDER — POTASSIUM CHLORIDE 1500 MG/1
20 TABLET, EXTENDED RELEASE ORAL DAILY
Qty: 90 TABLET | Refills: 1 | Status: SHIPPED | OUTPATIENT
Start: 2023-07-21

## 2023-07-21 NOTE — TELEPHONE ENCOUNTER
Medication:   Requested Prescriptions     Pending Prescriptions Disp Refills    potassium chloride (KLOR-CON M) 20 MEQ TBCR extended release tablet 90 tablet 3     Sig: Take 1 tablet by mouth daily      Last Filled:      Patient Phone Number: [phone] (home)     Last appt: 2/27/2023   Next appt: 10/9/2023    Last OARRS:   RX Monitoring 2/4/2016   Attestation The Prescription Monitoring Report for this patient was reviewed today. Periodic Controlled Substance Monitoring No signs of potential drug abuse or diversion identified.      PDMP Monitoring:    Last PDMP Brock Gonzalez as Reviewed [facility]):  Review User Review Instant Review Result          Preferred Pharmacy:   [address] [phone] [facility] [phone]  [address] [place], [place] [id number]  Phone: [phone] Fax: [phone]    [facility] [address] - [address] [phone] [facility] [phone]  [facility] [address]  Phone: [phone] Fax: [phone]

## 2023-07-25 ENCOUNTER — OFFICE VISIT (OUTPATIENT)
Dept: SURGERY | Age: 63
End: 2023-07-25
Payer: COMMERCIAL

## 2023-07-25 VITALS — BODY MASS INDEX: 25.4 KG/M2 | WEIGHT: 143.4 LBS | SYSTOLIC BLOOD PRESSURE: 125 MMHG | DIASTOLIC BLOOD PRESSURE: 63 MMHG

## 2023-07-25 DIAGNOSIS — C18.2 CANCER OF ASCENDING COLON (HCC): Primary | ICD-10-CM

## 2023-07-25 PROCEDURE — 3074F SYST BP LT 130 MM HG: CPT | Performed by: SURGERY

## 2023-07-25 PROCEDURE — 3078F DIAST BP <80 MM HG: CPT | Performed by: SURGERY

## 2023-07-25 PROCEDURE — 99213 OFFICE O/P EST LOW 20 MIN: CPT | Performed by: SURGERY

## 2023-08-08 ENCOUNTER — OFFICE VISIT (OUTPATIENT)
Dept: FAMILY MEDICINE CLINIC | Age: 63
End: 2023-08-08
Payer: COMMERCIAL

## 2023-08-08 VITALS
BODY MASS INDEX: 25.38 KG/M2 | RESPIRATION RATE: 12 BRPM | HEART RATE: 74 BPM | SYSTOLIC BLOOD PRESSURE: 122 MMHG | TEMPERATURE: 98.3 F | WEIGHT: 143.25 LBS | DIASTOLIC BLOOD PRESSURE: 70 MMHG

## 2023-08-08 DIAGNOSIS — K11.20 PAROTIDITIS: Primary | ICD-10-CM

## 2023-08-08 PROCEDURE — 3074F SYST BP LT 130 MM HG: CPT | Performed by: FAMILY MEDICINE

## 2023-08-08 PROCEDURE — 99213 OFFICE O/P EST LOW 20 MIN: CPT | Performed by: FAMILY MEDICINE

## 2023-08-08 PROCEDURE — 3078F DIAST BP <80 MM HG: CPT | Performed by: FAMILY MEDICINE

## 2023-08-08 RX ORDER — CEPHALEXIN 500 MG/1
500 CAPSULE ORAL 3 TIMES DAILY
Qty: 21 CAPSULE | Refills: 0 | Status: SHIPPED | OUTPATIENT
Start: 2023-08-08 | End: 2023-08-15

## 2023-08-08 NOTE — PROGRESS NOTES
Subjective:      Patient ID: Fartun Hitchcock is a 58 y.o. female. CC: Patient presents for acute medical problem-left ear pain. Medical assistant notes reviewed. HPI Pt is here to have her left ear pain. Pt states it started hurting few days ago and it has gotten worse. Pt feels like her whole left side of the face is achy . The symptoms just started for the last several days. She denies any issues with biting or chewing. She denies any pain down in her ear. She believes the area becomes more swollen after eating. Review of Systems  Allergies   Allergen Reactions    Ace Inhibitors      Lip swelling    Lisinopril Hives    Penicillins Hives    Bacitracin Rash    Morphine Nausea And Vomiting     Violently ill      Objective:   Physical Exam  Vitals and nursing note reviewed. Constitutional:       General: She is not in acute distress. Appearance: She is well-developed. HENT:      Head:      Jaw: There is normal jaw occlusion. Tenderness (Left preauricular area) present. Left Ear: Ear canal and external ear normal. There is no impacted cerumen. Lymphadenopathy:      Cervical: No cervical adenopathy. Skin:     General: Skin is warm. Findings: No rash. Neurological:      Mental Status: She is alert. Psychiatric:         Behavior: Behavior is cooperative. Assessment:      Michelle Costa was seen today for otalgia. Diagnoses and all orders for this visit:    Parotiditis    Other orders  -     cephALEXin (KEFLEX) 500 MG capsule;  Take 1 capsule by mouth 3 times daily for 7 days            Plan:      Informational handout provided  RTC PRN    Medical decision making of low complexity

## 2023-08-16 ENCOUNTER — TELEPHONE (OUTPATIENT)
Dept: FAMILY MEDICINE CLINIC | Age: 63
End: 2023-08-16

## 2023-08-16 RX ORDER — CEPHALEXIN 500 MG/1
500 CAPSULE ORAL 3 TIMES DAILY
Qty: 21 CAPSULE | Refills: 0 | Status: SHIPPED | OUTPATIENT
Start: 2023-08-16 | End: 2023-08-23

## 2023-08-16 NOTE — TELEPHONE ENCOUNTER
Called Pt, gave info about new ABX prescription and to start probiotics. Pt states that she has been eating yogurt twice a day since starting ABX but will add a probiotic to her daily regime. Will call back if second round of ABX is not successful. Pt had no further needs at this time.

## 2023-08-16 NOTE — TELEPHONE ENCOUNTER
Patient was put on Cephalexin last week for parotiditis, finished last dose yesterday. Dr. Antoinette Yee advised pt to call back in if she was still having problems after finishing ATB. Symptoms have improved , but still c/o of pain in that area, headache, and slight swelling. She would like to know if she can get a refill of the Cephalexin .        Sherryle Currier

## 2023-08-21 ENCOUNTER — TELEPHONE (OUTPATIENT)
Dept: FAMILY MEDICINE CLINIC | Age: 63
End: 2023-08-21

## 2023-08-21 DIAGNOSIS — B37.31 VAGINAL YEAST INFECTION: Primary | ICD-10-CM

## 2023-08-21 RX ORDER — FLUCONAZOLE 150 MG/1
150 TABLET ORAL ONCE
Qty: 1 TABLET | Refills: 1 | Status: SHIPPED | OUTPATIENT
Start: 2023-08-21 | End: 2023-08-21

## 2023-08-21 NOTE — TELEPHONE ENCOUNTER
Patient has been on antibiotics x 2 weeks. She has developed vaginal itching /burning. She has tried over the counter medication and they are not working. She is requesting a prescription to be sent to the pharmacy for something stronger. She can be reached at [phone] if you have any questions.      [facility] [address] [phone] - F [phone] [address] [address], [address] [place] [address] Drive [address]   Phone:  [phone]  Fax:  [phone]

## 2023-09-06 ENCOUNTER — OFFICE VISIT (OUTPATIENT)
Dept: DERMATOLOGY | Age: 63
End: 2023-09-06

## 2023-09-06 DIAGNOSIS — L66.9 ALOPECIA, SCARRING: Primary | ICD-10-CM

## 2023-09-06 NOTE — PROGRESS NOTES
[facility]) Dermatology  Trang Meeks M.D.  [phone]       Cesia Colorado  1960    58 y.o. female     Date of Visit: 9/6/2023    Chief Complaint:   Chief Complaint   Patient presents with    Skin Lesion        I was asked to see this patient by Dr. Venegas ref. provider found. History of Present Illness:  1. Follow up scarring alopecia. Larger lesion with 2 focal areas of scale at margin, 2 smaller lesions improved with liquid nitrogen but still with some scale. Last total-body skin exam 4/23        Watches 6 grandchildren every day during the summer, taking care of parents in her 90s-dementia     Skin history:  History of basal cell carcinoma left chest and right forearm      12/16 Cutaneous sarcoid with no evidence of systemic involvement. Developed at site of IV. Tissue cultures negative. 5/17 superficial basal cell carcinoma central chest status post curettage  3/20 superficial basal cell carcinoma left medial lower leg  3/20 superficial basal cell carcinoma right medial lower leg  3/20 superficial basal cell carcinoma glabella status post Mohs 6/1/2020 (COVID)  5/20 left thenar forearm superficial basal cell carcinoma status post curettage      Seborrheic dermatitis-triamcinolone 0.025% cream or Elidel 1% cream     Rosacea-MetroGel 0.75%     Family history positive for nonmelanoma skin cancer in both parents    Review of Systems:  Constitutional: Reports general sense of well-being       Past Medical History, Surgical History, Family History, Medications and Allergies reviewed. Social History:   Social History     Socioeconomic History    Marital status:      Spouse name: Not on file    Number of children: Not on file    Years of education: Not on file    Highest education level: Not on file   Occupational History    Not on file   Tobacco Use    Smoking status: Never    Smokeless tobacco: Never   Vaping Use    Vaping Use: Never used   Substance and Sexual Activity    Alcohol use:  Yes

## 2023-09-26 ENCOUNTER — OFFICE VISIT (OUTPATIENT)
Dept: FAMILY MEDICINE CLINIC | Age: 63
End: 2023-09-26
Payer: COMMERCIAL

## 2023-09-26 VITALS — HEART RATE: 92 BPM | TEMPERATURE: 99.3 F | OXYGEN SATURATION: 98 %

## 2023-09-26 DIAGNOSIS — B96.89 ACUTE BACTERIAL RHINOSINUSITIS: Primary | ICD-10-CM

## 2023-09-26 DIAGNOSIS — J01.90 ACUTE BACTERIAL RHINOSINUSITIS: Primary | ICD-10-CM

## 2023-09-26 PROCEDURE — 99213 OFFICE O/P EST LOW 20 MIN: CPT | Performed by: STUDENT IN AN ORGANIZED HEALTH CARE EDUCATION/TRAINING PROGRAM

## 2023-09-26 RX ORDER — FLUTICASONE PROPIONATE 50 MCG
2 SPRAY, SUSPENSION (ML) NASAL DAILY
Qty: 16 G | Refills: 0 | Status: SHIPPED | OUTPATIENT
Start: 2023-09-26

## 2023-09-26 RX ORDER — DOXYCYCLINE HYCLATE 100 MG
100 TABLET ORAL 2 TIMES DAILY
Qty: 14 TABLET | Refills: 0 | Status: SHIPPED | OUTPATIENT
Start: 2023-09-26 | End: 2023-10-03

## 2023-09-26 ASSESSMENT — ENCOUNTER SYMPTOMS
NAUSEA: 0
RHINORRHEA: 0
ABDOMINAL PAIN: 0
DIARRHEA: 0
SORE THROAT: 0
VOMITING: 0
SINUS PRESSURE: 1
SHORTNESS OF BREATH: 0
COUGH: 1

## 2023-09-26 NOTE — ASSESSMENT & PLAN NOTE
Symptoms consistent with bacterial infection given length of symptoms and not improving with OTC remedies  Plan  - Doxycycline and Flonase ordered

## 2023-09-28 ENCOUNTER — HOSPITAL ENCOUNTER (OUTPATIENT)
Dept: WOMENS IMAGING | Age: 63
Discharge: HOME | End: 2023-09-28
Payer: COMMERCIAL

## 2023-09-28 VITALS — HEIGHT: 63 IN | WEIGHT: 145 LBS | BODY MASS INDEX: 25.69 KG/M2

## 2023-09-28 DIAGNOSIS — Z13.9 ENCOUNTER FOR SCREENING: ICD-10-CM

## 2023-09-28 PROCEDURE — 77063 BREAST TOMOSYNTHESIS BI: CPT

## 2023-10-02 NOTE — PROGRESS NOTES
Name_______________________________________Printed:____________________  Date and time of surgery______10/16/23 0830__________________Arrival Time:____0630 [facility]   1. The instructions given regarding when and if a patient needs to stop oral intake prior to surgery varies. Follow the specific instructions you were given                  _x__Nothing to eat or to drink after Midnight the night before. __x__Carbo loading or instructions will be given to select patients-if you have been given those instructions -please do the following                           The evening before your surgery after dinner before midnight drink 40 ounces of gatorade. If you are diabetic use sugar free. 2. Take the following pills with a small sip of water on the morning of surgery___________________________________________________                  Do not take blood pressure medications ending in pril or sartan the alem prior to surgery or the morning of surgery. Dr Tonia Boyle patient are not to take any medications the AM of surgery. 3. Aspirin, Ibuprofen, Advil, Naproxen, Vitamin E and other Anti-inflammatory products and supplements should be stopped for 5 -7days before surgery or as directed by your physician. 4. Check with your Doctor regarding stopping Plavix, Coumadin,Eliquis, Lovenox,Effient,Pradaxa,Xarelto, Fragmin or other blood thinners and follow their instructions. 5. Do not smoke, and do not drink any alcoholic beverages 24 hours prior to surgery. This includes NA Beer. Refrain from the usage of any recreational drugs. 6. You may brush your teeth and gargle the morning of surgery. DO NOT SWALLOW WATER   7. You MUST make arrangements for a responsible adult to stay on site while you are here and take you home after your surgery. You will not be allowed to leave alone or drive yourself home. It is strongly suggested someone stay with you the first 24 hrs.  Your surgery will be cancelled

## 2023-10-03 ENCOUNTER — HOSPITAL ENCOUNTER (OUTPATIENT)
Age: 63
Discharge: HOME | End: 2023-10-03
Payer: COMMERCIAL

## 2023-10-03 DIAGNOSIS — Z01.818 PREOP TESTING: ICD-10-CM

## 2023-10-03 LAB
ABO + RH BLD: NORMAL
ANION GAP SERPL CALCULATED.3IONS-SCNC: 13 MMOL/L (ref 3–16)
APTT BLD: 29.2 SEC (ref 22.7–35.9)
BASOPHILS # BLD: 0 K/UL (ref 0–0.2)
BASOPHILS NFR BLD: 0.9 %
BILIRUB UR QL STRIP.AUTO: NEGATIVE
BLD GP AB SCN SERPL QL: NORMAL
BUN SERPL-MCNC: 17 MG/DL (ref 7–20)
CALCIUM SERPL-MCNC: 9.1 MG/DL (ref 8.3–10.6)
CHLORIDE SERPL-SCNC: 101 MMOL/L (ref 99–110)
CLARITY UR: CLEAR
CO2 SERPL-SCNC: 27 MMOL/L (ref 21–32)
COLOR UR: YELLOW
CREAT SERPL-MCNC: 0.8 MG/DL (ref 0.6–1.2)
DEPRECATED RDW RBC AUTO: 14.4 % (ref 12.4–15.4)
EKG ATRIAL RATE: 72 BPM
EKG DIAGNOSIS: NORMAL
EKG P AXIS: 44 DEGREES
EKG P-R INTERVAL: 140 MS
EKG Q-T INTERVAL: 418 MS
EKG QRS DURATION: 108 MS
EKG QTC CALCULATION (BAZETT): 457 MS
EKG R AXIS: -12 DEGREES
EKG T AXIS: 48 DEGREES
EKG VENTRICULAR RATE: 72 BPM
EOSINOPHIL # BLD: 0.2 K/UL (ref 0–0.6)
EOSINOPHIL NFR BLD: 3.6 %
GFR SERPLBLD CREATININE-BSD FMLA CKD-EPI: >60 ML/MIN/{1.73_M2}
GLUCOSE SERPL-MCNC: 86 MG/DL (ref 70–99)
GLUCOSE UR STRIP.AUTO-MCNC: NEGATIVE MG/DL
HCT VFR BLD AUTO: 36.4 % (ref 36–48)
HGB BLD-MCNC: 12.4 G/DL (ref 12–16)
HGB UR QL STRIP.AUTO: NEGATIVE
INR PPP: 0.97 (ref 0.84–1.16)
KETONES UR STRIP.AUTO-MCNC: NEGATIVE MG/DL
LEUKOCYTE ESTERASE UR QL STRIP.AUTO: NEGATIVE
LYMPHOCYTES # BLD: 1.8 K/UL (ref 1–5.1)
LYMPHOCYTES NFR BLD: 34.1 %
MCH RBC QN AUTO: 26.9 PG (ref 26–34)
MCHC RBC AUTO-ENTMCNC: 34 G/DL (ref 31–36)
MCV RBC AUTO: 79.1 FL (ref 80–100)
MONOCYTES # BLD: 0.5 K/UL (ref 0–1.3)
MONOCYTES NFR BLD: 10.6 %
NEUTROPHILS # BLD: 2.6 K/UL (ref 1.7–7.7)
NEUTROPHILS NFR BLD: 50.8 %
NITRITE UR QL STRIP.AUTO: NEGATIVE
PH UR STRIP.AUTO: 6 [PH] (ref 5–8)
PLATELET # BLD AUTO: 300 K/UL (ref 135–450)
PMV BLD AUTO: 7.9 FL (ref 5–10.5)
POTASSIUM SERPL-SCNC: 3.5 MMOL/L (ref 3.5–5.1)
PROT UR STRIP.AUTO-MCNC: NEGATIVE MG/DL
PROTHROMBIN TIME: 12.9 SEC (ref 11.5–14.8)
RBC # BLD AUTO: 4.6 M/UL (ref 4–5.2)
SODIUM SERPL-SCNC: 141 MMOL/L (ref 136–145)
SP GR UR STRIP.AUTO: 1.02 (ref 1–1.03)
UA DIPSTICK W REFLEX MICRO PNL UR: NORMAL
URN SPEC COLLECT METH UR: NORMAL
UROBILINOGEN UR STRIP-ACNC: 0.2 E.U./DL
WBC # BLD AUTO: 5.2 K/UL (ref 4–11)

## 2023-10-03 PROCEDURE — 80048 BASIC METABOLIC PNL TOTAL CA: CPT

## 2023-10-03 PROCEDURE — 81003 URINALYSIS AUTO W/O SCOPE: CPT

## 2023-10-03 PROCEDURE — 85610 PROTHROMBIN TIME: CPT

## 2023-10-03 PROCEDURE — 87081 CULTURE SCREEN ONLY: CPT

## 2023-10-03 PROCEDURE — 87086 URINE CULTURE/COLONY COUNT: CPT

## 2023-10-03 PROCEDURE — 93010 ELECTROCARDIOGRAM REPORT: CPT | Performed by: INTERNAL MEDICINE

## 2023-10-03 PROCEDURE — 86900 BLOOD TYPING SEROLOGIC ABO: CPT

## 2023-10-03 PROCEDURE — 83036 HEMOGLOBIN GLYCOSYLATED A1C: CPT

## 2023-10-03 PROCEDURE — 93005 ELECTROCARDIOGRAM TRACING: CPT | Performed by: ORTHOPAEDIC SURGERY

## 2023-10-03 PROCEDURE — 86850 RBC ANTIBODY SCREEN: CPT

## 2023-10-03 PROCEDURE — 86901 BLOOD TYPING SEROLOGIC RH(D): CPT

## 2023-10-03 PROCEDURE — 36415 COLL VENOUS BLD VENIPUNCTURE: CPT

## 2023-10-03 PROCEDURE — 85025 COMPLETE CBC W/AUTO DIFF WBC: CPT

## 2023-10-03 PROCEDURE — 85730 THROMBOPLASTIN TIME PARTIAL: CPT

## 2023-10-04 LAB
BACTERIA UR CULT: NORMAL
EST. AVERAGE GLUCOSE BLD GHB EST-MCNC: 108.3 MG/DL
HBA1C MFR BLD: 5.4 %

## 2023-10-06 LAB — MRSA SPEC QL CULT: NORMAL

## 2023-10-09 ENCOUNTER — HOSPITAL ENCOUNTER (OUTPATIENT)
Dept: PHYSICAL THERAPY | Age: 63
Setting detail: THERAPIES SERIES
Discharge: HOME | End: 2023-10-09
Payer: COMMERCIAL

## 2023-10-09 ENCOUNTER — OFFICE VISIT (OUTPATIENT)
Dept: FAMILY MEDICINE CLINIC | Age: 63
End: 2023-10-09
Payer: COMMERCIAL

## 2023-10-09 VITALS
DIASTOLIC BLOOD PRESSURE: 66 MMHG | WEIGHT: 145.38 LBS | BODY MASS INDEX: 25.75 KG/M2 | TEMPERATURE: 97.9 F | HEART RATE: 81 BPM | OXYGEN SATURATION: 98 % | RESPIRATION RATE: 12 BRPM | SYSTOLIC BLOOD PRESSURE: 112 MMHG

## 2023-10-09 DIAGNOSIS — R26.9 GAIT ABNORMALITY: Primary | ICD-10-CM

## 2023-10-09 DIAGNOSIS — K51.00 ULCERATIVE PANCOLITIS WITHOUT COMPLICATION (HCC): ICD-10-CM

## 2023-10-09 DIAGNOSIS — R52 PAIN: ICD-10-CM

## 2023-10-09 DIAGNOSIS — C18.2 CANCER OF ASCENDING COLON (HCC): ICD-10-CM

## 2023-10-09 DIAGNOSIS — M17.11 PRIMARY OSTEOARTHRITIS OF RIGHT KNEE: Primary | ICD-10-CM

## 2023-10-09 DIAGNOSIS — R26.89 BALANCE DISORDER: ICD-10-CM

## 2023-10-09 DIAGNOSIS — R53.1 WEAKNESS: ICD-10-CM

## 2023-10-09 DIAGNOSIS — I10 ESSENTIAL HYPERTENSION: ICD-10-CM

## 2023-10-09 DIAGNOSIS — Z01.818 PREOP EXAMINATION: ICD-10-CM

## 2023-10-09 DIAGNOSIS — E04.9 GOITER: ICD-10-CM

## 2023-10-09 PROBLEM — S83.231A COMPLEX TEAR OF MEDIAL MENISCUS OF RIGHT KNEE AS CURRENT INJURY: Status: RESOLVED | Noted: 2019-03-04 | Resolved: 2023-10-09

## 2023-10-09 PROBLEM — J01.90 ACUTE BACTERIAL RHINOSINUSITIS: Status: RESOLVED | Noted: 2023-09-26 | Resolved: 2023-10-09

## 2023-10-09 PROBLEM — B96.89 ACUTE BACTERIAL RHINOSINUSITIS: Status: RESOLVED | Noted: 2023-09-26 | Resolved: 2023-10-09

## 2023-10-09 PROCEDURE — 97530 THERAPEUTIC ACTIVITIES: CPT

## 2023-10-09 PROCEDURE — 99214 OFFICE O/P EST MOD 30 MIN: CPT | Performed by: FAMILY MEDICINE

## 2023-10-09 PROCEDURE — 3074F SYST BP LT 130 MM HG: CPT | Performed by: FAMILY MEDICINE

## 2023-10-09 PROCEDURE — 97162 PT EVAL MOD COMPLEX 30 MIN: CPT

## 2023-10-09 PROCEDURE — 3078F DIAST BP <80 MM HG: CPT | Performed by: FAMILY MEDICINE

## 2023-10-09 PROCEDURE — 97110 THERAPEUTIC EXERCISES: CPT

## 2023-10-09 NOTE — FLOWSHEET NOTE
primary diagnosis which significantly impact the rate of recovery and contribute to complexities that require skilled therapeutic intervention  The patient has associated variables that influence the amount of treatment to include:  Social support, self-efficacy/motivation, prognosis, time since onset/acuity. Treatment/Activity Tolerance:  [x] Patient tolerated treatment well [] Patient limited by fatique  [] Patient limited by pain  [] Patient limited by other medical complications  [] Other:     Return to Play: NA    Prognosis for POC: [x] Good [] Fair  [] Poor    Patient requires continued skilled intervention: [x] Yes  [] No      GOALS     GOALS:  Patient stated goal: \"steps\" \"sleeping\"  [] Progressing: [] Met: [] Not Met: [] Adjusted     Therapist goals for Patient:   Short Term Goals: To be achieved in: 1-2 visit(s)  1. Independent in HEP and progression per patient tolerance, in order to prevent re-injury. [] Progressing: [] Met: [] Not Met: [] Adjusted  2. All patient questions regarding expectations for rehab following upcoming surgery are answered. [] Progressing: [] Met: [] Not Met: [] Adjusted     Long Term Goals: long term goals to be determined at re-eval following upcoming surgery    Overall Progression Towards Functional goals/ Treatment Progress Update:  [] Patient is progressing as expected towards functional goals listed. [] Progression is slowed due to complexities/Impairments listed. [] Progression has been slowed due to co-morbidities. [x] Plan just implemented, too soon (<30days) to assess goals progression   [] Goals require adjustment due to lack of progress  [] Patient is not progressing as expected and requires additional follow up with physician  [] Other:     CHARGE CAPTURE     CHARGE GRID   CPT Code (TIMED) minutes # CPT Code (UNTIMED) #     [x] Therex (57072)  10 1  [] EVAL:MODERATE (17797 - Typically 30 minutes face-to-face) 1    [] Neuromusc.  Re-ed (94624)    [] Re-Eval

## 2023-10-09 NOTE — PROGRESS NOTES
Preoperative Consultation    Costa   YOB: 1960    This patient presents to the office today for a preoperative consultation at the request of surgeon, Dr. Cassia Yang, who plans on performing right knee replacement on  at [facility].       Planned anesthesia: General   Known anesthesia problems: Past endotracheal intubation with complications- GI upset post op   Bleeding risk: No recent or remote history of abnormal bleeding  Personal or FH of DVT/PE: No      Patient Active Problem List   Diagnosis    Ulcerative colitis ([address])    Essential hypertension    Goiter    Irritable bowel syndrome    Meniere's disease    Hypercholesteremia    Cancer of ascending colon ([address])    Gastroesophageal reflux disease without esophagitis    Cutaneous sarcoidosis (HCC)    Complex tear of medial meniscus of right knee as current injury    Primary osteoarthritis of right knee    Allergic conjunctivitis of both eyes    Food allergy    Acute bacterial rhinosinusitis     Past Surgical History:   Procedure Laterality Date     SECTION      x2    CHOLECYSTECTOMY      HYSTERECTOMY (CERVIX STATUS UNKNOWN)      total, with ovaries    KNEE ARTHROSCOPY Right 3/15/2019    RIGHT KNEE ARTHROSCOPE, PARTIAL MEDIAL AND LATERAL MENISCECTOMY, SYNOVECTOMY, CHONDROPLASTY performed by Pamela Chavez DO at [address] [place] [address]      multiple    MOHS SURGERY  2020    Right glabella    OTHER SURGICAL HISTORY      basal cell carcinoma, RT arm    OVARY REMOVAL      RIGHT COLECTOMY  3/16/16    laparoscopic     SEPTOPLASTY         Allergies   Allergen Reactions    Ace Inhibitors      Lip swelling    Lisinopril Hives    Penicillins Hives    Bacitracin Rash    Morphine Nausea And Vomiting     Violently ill     Outpatient Medications Marked as Taking for the 10/9/23 encounter (Office Visit) with Elsa Mejia MD   Medication Sig Dispense Refill    fluticasone (FLONASE) 50 MCG/ACT nasal spray 2

## 2023-10-11 ENCOUNTER — HOSPITAL ENCOUNTER (OUTPATIENT)
Dept: PHYSICAL THERAPY | Age: 63
Setting detail: THERAPIES SERIES
Discharge: HOME | End: 2023-10-11
Payer: COMMERCIAL

## 2023-10-11 ENCOUNTER — OFFICE VISIT (OUTPATIENT)
Dept: DERMATOLOGY | Age: 63
End: 2023-10-11
Payer: COMMERCIAL

## 2023-10-11 ENCOUNTER — TELEPHONE (OUTPATIENT)
Dept: DERMATOLOGY | Age: 63
End: 2023-10-11

## 2023-10-11 DIAGNOSIS — L55.9 SUNBURN: Primary | ICD-10-CM

## 2023-10-11 PROCEDURE — 97110 THERAPEUTIC EXERCISES: CPT

## 2023-10-11 PROCEDURE — 99213 OFFICE O/P EST LOW 20 MIN: CPT | Performed by: DERMATOLOGY

## 2023-10-11 PROCEDURE — 97116 GAIT TRAINING THERAPY: CPT

## 2023-10-11 RX ORDER — KETOCONAZOLE 20 MG/G
CREAM TOPICAL
Qty: 15 G | Refills: 0 | Status: SHIPPED | OUTPATIENT
Start: 2023-10-11

## 2023-10-11 NOTE — PROGRESS NOTES
Spouse name: Not on file    Number of children: Not on file    Years of education: Not on file    Highest education level: Not on file   Occupational History    Not on file   Tobacco Use    Smoking status: Never    Smokeless tobacco: Never   Vaping Use    Vaping Use: Never used   Substance and Sexual Activity    Alcohol use: Yes     Alcohol/week: 0.0 standard drinks of alcohol     Comment: ONE DRINK PER WEEK    Drug use: No    Sexual activity: Not on file   Other Topics Concern    Not on file   Social History Narrative    Not on file     Social Determinants of Health     Financial Resource Strain: Low Risk  (2/27/2023)    Overall Financial Resource Strain (CARDIA)     Difficulty of Paying Living Expenses: Not hard at all   Food Insecurity: No Food Insecurity (2/27/2023)    Hunger Vital Sign     Worried About Running Out of Food in the Last Year: Never true     Ran Out of Food in the Last Year: Never true   Transportation Needs: Unknown (2/27/2023)    PRAPARE - Transportation     Lack of Transportation (Medical): Not on file     Lack of Transportation (Non-Medical): No   Physical Activity: Not on file   Stress: Not on file   Social Connections: Not on file   Intimate Partner Violence: Not on file   Housing Stability: Unknown (2/27/2023)    Housing Stability Vital Sign     Unable to Pay for Housing in the Last Year: Not on file     Number of Places Lived in the Last Year: Not on file     Unstable Housing in the Last Year: No       Physical Examination       -General: Well-appearing, NAD  1. Superficial crust along lower lip at vermilion. No fissures or vesicles today      Assessment and Plan     1. Sunburn-may have had labial herpes simplex which is still resolving-discussed Aquaphor and samples were given. Ketoconazole 2% cream twice daily if she develops a fissure.

## 2023-10-11 NOTE — FLOWSHEET NOTE
[facility] and Therapy [facility], [address] office: [phone] fax: [phone]      Physical Therapy: TREATMENT/PROGRESS NOTE   Patient: Cesia Colorado (11 y.o. female)   Treatment Date: 10/11/2023   :  1960 MRN: [id number]   Visit #: 2   Insurance Allowable Auth Needed   60/yr  0 used as of 10/9/23 []Yes    [x]No    Insurance: Payor: UMR / Plan: UMR / Product Type: *No Product type* /   Insurance ID: [id number] - (Commercial)  Secondary Insurance (if applicable):    Treatment Diagnosis:     ICD-10-CM    1. Gait abnormality  R26.9       2. Pain  R52       3. Weakness  R53.1       4. Balance disorder  R26.89          Medical Diagnosis:    Unilateral primary osteoarthritis, right knee [M17.11]   Referring Physician: Marita Hitchcock MD  PCP: Faby Kirby MD                             Plan of care signed (Y/N):     Date of Patient follow up with Physician: Surgery scheduled for 10/16     Progress Report/POC: NO  POC update due: 11/10/2023 (OR 10 visits /OR [address], whichever is less)    Latex Allergy:  [x]NO      []YES    Preferred Language for Healthcare:   [x]English       []other:    SUBJECTIVE EXAMINATION     Patient Report/Comments: Pt reports she is ready for surgery on Monday. She reports buying a vaso machine to ice knee after surgery and asked whether to use ice or a frozen water bottle in the machine. Reports  will be taking her to therapy next week after surgery.     OBJECTIVE EXAMINATION     Observation:     Test measurements:      Test used Initial score  10/9/23 10/11/2023   Pain Summary VAS 6 5-6   Functional questionnaire WOMAC 71 / 74%    Other:              RESTRICTIONS/PRECAUTIONS: hx of colon cancer, HTN, R TKA scheduled for 10/16/23    Exercises/Interventions:     Therapeutic Ex (53909)  resistance Sets/time Reps Notes/Cues/Progressions   Bike 1.3 5 min            Review HEP and answered pt's

## 2023-10-11 NOTE — TELEPHONE ENCOUNTER
Pt calling states she is having knee surgery on Monday she is have burned lips have some concern pls return call back @ [phone]

## 2023-10-16 ENCOUNTER — ANESTHESIA EVENT (OUTPATIENT)
Dept: OPERATING ROOM | Age: 63
End: 2023-10-16
Payer: COMMERCIAL

## 2023-10-16 ENCOUNTER — HOSPITAL ENCOUNTER (OUTPATIENT)
Age: 63
Discharge: HOME | End: 2023-10-16
Attending: ORTHOPAEDIC SURGERY | Admitting: ORTHOPAEDIC SURGERY
Payer: COMMERCIAL

## 2023-10-16 ENCOUNTER — APPOINTMENT (OUTPATIENT)
Dept: GENERAL RADIOLOGY | Age: 63
End: 2023-10-16
Attending: ORTHOPAEDIC SURGERY
Payer: COMMERCIAL

## 2023-10-16 ENCOUNTER — ANESTHESIA (OUTPATIENT)
Dept: OPERATING ROOM | Age: 63
End: 2023-10-16
Payer: COMMERCIAL

## 2023-10-16 VITALS
WEIGHT: 143 LBS | HEART RATE: 72 BPM | RESPIRATION RATE: 14 BRPM | HEIGHT: 63 IN | BODY MASS INDEX: 25.34 KG/M2 | SYSTOLIC BLOOD PRESSURE: 120 MMHG | OXYGEN SATURATION: 98 % | DIASTOLIC BLOOD PRESSURE: 67 MMHG | TEMPERATURE: 98.1 F

## 2023-10-16 DIAGNOSIS — Z01.818 PREOP TESTING: Primary | ICD-10-CM

## 2023-10-16 DIAGNOSIS — M17.11 PRIMARY OSTEOARTHRITIS OF RIGHT KNEE: ICD-10-CM

## 2023-10-16 LAB
ABO + RH BLD: NORMAL
BLD GP AB SCN SERPL QL: NORMAL
GLUCOSE BLD-MCNC: 89 MG/DL (ref 70–99)
HCT VFR BLD AUTO: 34.1 % (ref 36–48)
HGB BLD-MCNC: 11.3 G/DL (ref 12–16)
PERFORMED ON: NORMAL

## 2023-10-16 PROCEDURE — 7100000011 HC PHASE II RECOVERY - ADDTL 15 MIN: Performed by: ORTHOPAEDIC SURGERY

## 2023-10-16 PROCEDURE — C1776 JOINT DEVICE (IMPLANTABLE): HCPCS | Performed by: ORTHOPAEDIC SURGERY

## 2023-10-16 PROCEDURE — 86901 BLOOD TYPING SEROLOGIC RH(D): CPT

## 2023-10-16 PROCEDURE — 6360000002 HC RX W HCPCS: Performed by: ANESTHESIOLOGY

## 2023-10-16 PROCEDURE — 6360000002 HC RX W HCPCS

## 2023-10-16 PROCEDURE — 6370000000 HC RX 637 (ALT 250 FOR IP): Performed by: ORTHOPAEDIC SURGERY

## 2023-10-16 PROCEDURE — 3600000005 HC SURGERY LEVEL 5 BASE: Performed by: ORTHOPAEDIC SURGERY

## 2023-10-16 PROCEDURE — 6360000002 HC RX W HCPCS: Performed by: ORTHOPAEDIC SURGERY

## 2023-10-16 PROCEDURE — 3700000001 HC ADD 15 MINUTES (ANESTHESIA): Performed by: ORTHOPAEDIC SURGERY

## 2023-10-16 PROCEDURE — C1713 ANCHOR/SCREW BN/BN,TIS/BN: HCPCS | Performed by: ORTHOPAEDIC SURGERY

## 2023-10-16 PROCEDURE — 7100000010 HC PHASE II RECOVERY - FIRST 15 MIN: Performed by: ORTHOPAEDIC SURGERY

## 2023-10-16 PROCEDURE — 2580000003 HC RX 258: Performed by: ORTHOPAEDIC SURGERY

## 2023-10-16 PROCEDURE — 86900 BLOOD TYPING SEROLOGIC ABO: CPT

## 2023-10-16 PROCEDURE — 85018 HEMOGLOBIN: CPT

## 2023-10-16 PROCEDURE — 7100000001 HC PACU RECOVERY - ADDTL 15 MIN: Performed by: ORTHOPAEDIC SURGERY

## 2023-10-16 PROCEDURE — 64447 NJX AA&/STRD FEMORAL NRV IMG: CPT | Performed by: ANESTHESIOLOGY

## 2023-10-16 PROCEDURE — 36415 COLL VENOUS BLD VENIPUNCTURE: CPT

## 2023-10-16 PROCEDURE — 2500000003 HC RX 250 WO HCPCS: Performed by: ORTHOPAEDIC SURGERY

## 2023-10-16 PROCEDURE — 2720000010 HC SURG SUPPLY STERILE: Performed by: ORTHOPAEDIC SURGERY

## 2023-10-16 PROCEDURE — 64999 UNLISTED PX NERVOUS SYSTEM: CPT | Performed by: ANESTHESIOLOGY

## 2023-10-16 PROCEDURE — 97165 OT EVAL LOW COMPLEX 30 MIN: CPT

## 2023-10-16 PROCEDURE — 73560 X-RAY EXAM OF KNEE 1 OR 2: CPT

## 2023-10-16 PROCEDURE — 3700000000 HC ANESTHESIA ATTENDED CARE: Performed by: ORTHOPAEDIC SURGERY

## 2023-10-16 PROCEDURE — 3600000015 HC SURGERY LEVEL 5 ADDTL 15MIN: Performed by: ORTHOPAEDIC SURGERY

## 2023-10-16 PROCEDURE — 2709999900 HC NON-CHARGEABLE SUPPLY: Performed by: ORTHOPAEDIC SURGERY

## 2023-10-16 PROCEDURE — 7100000000 HC PACU RECOVERY - FIRST 15 MIN: Performed by: ORTHOPAEDIC SURGERY

## 2023-10-16 PROCEDURE — 85014 HEMATOCRIT: CPT

## 2023-10-16 PROCEDURE — 86850 RBC ANTIBODY SCREEN: CPT

## 2023-10-16 PROCEDURE — 97161 PT EVAL LOW COMPLEX 20 MIN: CPT

## 2023-10-16 PROCEDURE — 2500000003 HC RX 250 WO HCPCS

## 2023-10-16 DEVICE — IMPLANTABLE DEVICE: Type: IMPLANTABLE DEVICE | Site: PATELLA | Status: FUNCTIONAL

## 2023-10-16 DEVICE — COMPONENT PAT DIA32MM KNEE POLY CEM MEDIALIZED ANAT ATTUNE: Type: IMPLANTABLE DEVICE | Site: PATELLA | Status: FUNCTIONAL

## 2023-10-16 DEVICE — CEMENT BNE 40GM FULL DOSE PMMA W/ GENT HI VISC RADPQ LNG: Type: IMPLANTABLE DEVICE | Site: KNEE | Status: FUNCTIONAL

## 2023-10-16 DEVICE — BASEPLATE TIB SZ 3 CEM ROT PLATFRM KNEE SYS S + ATTUNE: Type: IMPLANTABLE DEVICE | Site: KNEE | Status: FUNCTIONAL

## 2023-10-16 DEVICE — IMPLANTABLE DEVICE: Type: IMPLANTABLE DEVICE | Site: KNEE | Status: FUNCTIONAL

## 2023-10-16 RX ORDER — PHENYLEPHRINE HCL IN 0.9% NACL 1 MG/10 ML
SYRINGE (ML) INTRAVENOUS PRN
Status: DISCONTINUED | OUTPATIENT
Start: 2023-10-16 | End: 2023-10-16 | Stop reason: SDUPTHER

## 2023-10-16 RX ORDER — ACETAMINOPHEN 325 MG/1
650 TABLET ORAL EVERY 6 HOURS
Status: DISCONTINUED | OUTPATIENT
Start: 2023-10-16 | End: 2023-10-16 | Stop reason: HOSPADM

## 2023-10-16 RX ORDER — SODIUM CHLORIDE 0.9 % (FLUSH) 0.9 %
5-40 SYRINGE (ML) INJECTION PRN
Status: DISCONTINUED | OUTPATIENT
Start: 2023-10-16 | End: 2023-10-16 | Stop reason: HOSPADM

## 2023-10-16 RX ORDER — ONDANSETRON 2 MG/ML
4 INJECTION INTRAMUSCULAR; INTRAVENOUS EVERY 6 HOURS PRN
Status: DISCONTINUED | OUTPATIENT
Start: 2023-10-16 | End: 2023-10-16 | Stop reason: HOSPADM

## 2023-10-16 RX ORDER — SODIUM CHLORIDE 9 MG/ML
INJECTION, SOLUTION INTRAVENOUS PRN
Status: DISCONTINUED | OUTPATIENT
Start: 2023-10-16 | End: 2023-10-16 | Stop reason: HOSPADM

## 2023-10-16 RX ORDER — OXYCODONE HYDROCHLORIDE 5 MG/1
5 TABLET ORAL EVERY 4 HOURS PRN
Qty: 42 TABLET | Refills: 0 | Status: SHIPPED | OUTPATIENT
Start: 2023-10-16 | End: 2023-10-23

## 2023-10-16 RX ORDER — MIDAZOLAM HYDROCHLORIDE 1 MG/ML
INJECTION INTRAMUSCULAR; INTRAVENOUS PRN
Status: DISCONTINUED | OUTPATIENT
Start: 2023-10-16 | End: 2023-10-16 | Stop reason: SDUPTHER

## 2023-10-16 RX ORDER — SODIUM CHLORIDE, SODIUM LACTATE, POTASSIUM CHLORIDE, CALCIUM CHLORIDE 600; 310; 30; 20 MG/100ML; MG/100ML; MG/100ML; MG/100ML
INJECTION, SOLUTION INTRAVENOUS CONTINUOUS
Status: DISCONTINUED | OUTPATIENT
Start: 2023-10-16 | End: 2023-10-16 | Stop reason: HOSPADM

## 2023-10-16 RX ORDER — SODIUM CHLORIDE 0.9 % (FLUSH) 0.9 %
5-40 SYRINGE (ML) INJECTION EVERY 12 HOURS SCHEDULED
Status: DISCONTINUED | OUTPATIENT
Start: 2023-10-16 | End: 2023-10-16 | Stop reason: HOSPADM

## 2023-10-16 RX ORDER — ONDANSETRON 4 MG/1
4 TABLET, ORALLY DISINTEGRATING ORAL EVERY 8 HOURS PRN
Status: DISCONTINUED | OUTPATIENT
Start: 2023-10-16 | End: 2023-10-16 | Stop reason: HOSPADM

## 2023-10-16 RX ORDER — CEPHALEXIN 500 MG/1
500 CAPSULE ORAL 2 TIMES DAILY
Qty: 4 CAPSULE | Refills: 0 | Status: SHIPPED | OUTPATIENT
Start: 2023-10-16

## 2023-10-16 RX ORDER — LIDOCAINE HYDROCHLORIDE 10 MG/ML
0.5 INJECTION, SOLUTION EPIDURAL; INFILTRATION; INTRACAUDAL; PERINEURAL ONCE
Status: DISCONTINUED | OUTPATIENT
Start: 2023-10-16 | End: 2023-10-16 | Stop reason: HOSPADM

## 2023-10-16 RX ORDER — TRANEXAMIC ACID 10 MG/ML
1000 INJECTION, SOLUTION INTRAVENOUS
Status: DISCONTINUED | OUTPATIENT
Start: 2023-10-16 | End: 2023-10-16 | Stop reason: HOSPADM

## 2023-10-16 RX ORDER — MELOXICAM 15 MG/1
15 TABLET ORAL DAILY
Qty: 30 TABLET | Refills: 3 | Status: SHIPPED | OUTPATIENT
Start: 2023-10-16

## 2023-10-16 RX ORDER — HYDROMORPHONE HYDROCHLORIDE 2 MG/ML
0.25 INJECTION, SOLUTION INTRAMUSCULAR; INTRAVENOUS; SUBCUTANEOUS
Status: DISCONTINUED | OUTPATIENT
Start: 2023-10-16 | End: 2023-10-16 | Stop reason: HOSPADM

## 2023-10-16 RX ORDER — DIAZEPAM 5 MG/1
5 TABLET ORAL EVERY 8 HOURS PRN
Qty: 20 TABLET | Refills: 0 | Status: SHIPPED | OUTPATIENT
Start: 2023-10-16 | End: 2023-10-26

## 2023-10-16 RX ORDER — HYDROMORPHONE HYDROCHLORIDE 2 MG/ML
0.5 INJECTION, SOLUTION INTRAMUSCULAR; INTRAVENOUS; SUBCUTANEOUS
Status: DISCONTINUED | OUTPATIENT
Start: 2023-10-16 | End: 2023-10-16 | Stop reason: HOSPADM

## 2023-10-16 RX ORDER — ACETAMINOPHEN 500 MG
500 TABLET ORAL 4 TIMES DAILY PRN
Qty: 120 TABLET | Refills: 5 | Status: SHIPPED | OUTPATIENT
Start: 2023-10-16

## 2023-10-16 RX ORDER — OXYCODONE HYDROCHLORIDE 5 MG/1
10 TABLET ORAL EVERY 4 HOURS PRN
Status: DISCONTINUED | OUTPATIENT
Start: 2023-10-16 | End: 2023-10-16 | Stop reason: HOSPADM

## 2023-10-16 RX ORDER — ASPIRIN 325 MG
325 TABLET, DELAYED RELEASE (ENTERIC COATED) ORAL 2 TIMES DAILY
Status: DISCONTINUED | OUTPATIENT
Start: 2023-10-16 | End: 2023-10-16 | Stop reason: HOSPADM

## 2023-10-16 RX ORDER — OMEPRAZOLE 20 MG/1
40 CAPSULE, DELAYED RELEASE ORAL DAILY
COMMUNITY

## 2023-10-16 RX ORDER — PROPOFOL 10 MG/ML
INJECTION, EMULSION INTRAVENOUS CONTINUOUS PRN
Status: DISCONTINUED | OUTPATIENT
Start: 2023-10-16 | End: 2023-10-16 | Stop reason: SDUPTHER

## 2023-10-16 RX ORDER — ONDANSETRON 2 MG/ML
INJECTION INTRAMUSCULAR; INTRAVENOUS PRN
Status: DISCONTINUED | OUTPATIENT
Start: 2023-10-16 | End: 2023-10-16 | Stop reason: SDUPTHER

## 2023-10-16 RX ORDER — ONDANSETRON 2 MG/ML
4 INJECTION INTRAMUSCULAR; INTRAVENOUS
Status: DISCONTINUED | OUTPATIENT
Start: 2023-10-16 | End: 2023-10-16 | Stop reason: HOSPADM

## 2023-10-16 RX ORDER — OXYCODONE HYDROCHLORIDE 5 MG/1
5 TABLET ORAL EVERY 4 HOURS PRN
Status: DISCONTINUED | OUTPATIENT
Start: 2023-10-16 | End: 2023-10-16 | Stop reason: HOSPADM

## 2023-10-16 RX ORDER — MEPERIDINE HYDROCHLORIDE 25 MG/ML
12.5 INJECTION INTRAMUSCULAR; INTRAVENOUS; SUBCUTANEOUS EVERY 5 MIN PRN
Status: DISCONTINUED | OUTPATIENT
Start: 2023-10-16 | End: 2023-10-16 | Stop reason: HOSPADM

## 2023-10-16 RX ORDER — HYDROMORPHONE HYDROCHLORIDE 2 MG/ML
0.5 INJECTION, SOLUTION INTRAMUSCULAR; INTRAVENOUS; SUBCUTANEOUS EVERY 5 MIN PRN
Status: DISCONTINUED | OUTPATIENT
Start: 2023-10-16 | End: 2023-10-16 | Stop reason: HOSPADM

## 2023-10-16 RX ORDER — BUPIVACAINE HYDROCHLORIDE 2.5 MG/ML
INJECTION, SOLUTION INFILTRATION; PERINEURAL
Status: COMPLETED | OUTPATIENT
Start: 2023-10-16 | End: 2023-10-16

## 2023-10-16 RX ORDER — MIDAZOLAM HYDROCHLORIDE 2 MG/2ML
2 INJECTION, SOLUTION INTRAMUSCULAR; INTRAVENOUS ONCE
Status: COMPLETED | OUTPATIENT
Start: 2023-10-16 | End: 2023-10-16

## 2023-10-16 RX ORDER — BUPIVACAINE HYDROCHLORIDE 5 MG/ML
INJECTION, SOLUTION EPIDURAL; INTRACAUDAL
Status: COMPLETED | OUTPATIENT
Start: 2023-10-16 | End: 2023-10-16

## 2023-10-16 RX ORDER — MELOXICAM 7.5 MG/1
7.5 TABLET ORAL DAILY
Status: DISCONTINUED | OUTPATIENT
Start: 2023-10-16 | End: 2023-10-16 | Stop reason: HOSPADM

## 2023-10-16 RX ORDER — TRANEXAMIC ACID 10 MG/ML
1000 INJECTION, SOLUTION INTRAVENOUS ONCE
Status: COMPLETED | OUTPATIENT
Start: 2023-10-16 | End: 2023-10-16

## 2023-10-16 RX ORDER — ASPIRIN 81 MG/1
81 TABLET ORAL 2 TIMES DAILY
Qty: 60 TABLET | Refills: 0 | Status: SHIPPED | OUTPATIENT
Start: 2023-10-16

## 2023-10-16 RX ORDER — LIDOCAINE HYDROCHLORIDE 20 MG/ML
INJECTION, SOLUTION INFILTRATION; PERINEURAL PRN
Status: DISCONTINUED | OUTPATIENT
Start: 2023-10-16 | End: 2023-10-16 | Stop reason: SDUPTHER

## 2023-10-16 RX ORDER — MIDAZOLAM HYDROCHLORIDE 2 MG/2ML
2 INJECTION, SOLUTION INTRAMUSCULAR; INTRAVENOUS ONCE
Status: DISCONTINUED | OUTPATIENT
Start: 2023-10-16 | End: 2023-10-16

## 2023-10-16 RX ADMIN — MIDAZOLAM 1 MG: 1 INJECTION INTRAMUSCULAR; INTRAVENOUS at 08:55

## 2023-10-16 RX ADMIN — SODIUM CHLORIDE, POTASSIUM CHLORIDE, SODIUM LACTATE AND CALCIUM CHLORIDE: 600; 310; 30; 20 INJECTION, SOLUTION INTRAVENOUS at 07:13

## 2023-10-16 RX ADMIN — ACETAMINOPHEN 650 MG: 325 TABLET ORAL at 10:54

## 2023-10-16 RX ADMIN — Medication 100 MCG: at 09:41

## 2023-10-16 RX ADMIN — MIDAZOLAM 1 MG: 1 INJECTION INTRAMUSCULAR; INTRAVENOUS at 08:47

## 2023-10-16 RX ADMIN — BUPIVACAINE HYDROCHLORIDE 20 ML: 5 INJECTION EPIDURAL; INTRACAUDAL; PERINEURAL at 07:37

## 2023-10-16 RX ADMIN — ONDANSETRON 4 MG: 2 INJECTION INTRAMUSCULAR; INTRAVENOUS at 09:10

## 2023-10-16 RX ADMIN — VANCOMYCIN HYDROCHLORIDE 1000 MG: 1 INJECTION, POWDER, LYOPHILIZED, FOR SOLUTION INTRAVENOUS at 07:13

## 2023-10-16 RX ADMIN — PROPOFOL 75 MCG/KG/MIN: 10 INJECTION, EMULSION INTRAVENOUS at 08:57

## 2023-10-16 RX ADMIN — BUPIVACAINE HYDROCHLORIDE 20 ML: 2.5 INJECTION, SOLUTION INFILTRATION; PERINEURAL at 07:34

## 2023-10-16 RX ADMIN — OXYCODONE HYDROCHLORIDE 5 MG: 5 TABLET ORAL at 11:19

## 2023-10-16 RX ADMIN — BUPIVACAINE HYDROCHLORIDE AND EPINEPHRINE BITARTRATE: 2.5; .005 INJECTION, SOLUTION EPIDURAL; INFILTRATION; INTRACAUDAL; PERINEURAL at 09:28

## 2023-10-16 RX ADMIN — LIDOCAINE HYDROCHLORIDE 60 MG: 20 INJECTION, SOLUTION INFILTRATION; PERINEURAL at 08:54

## 2023-10-16 RX ADMIN — MEPIVACAINE HYDROCHLORIDE 40 MG: 20 INJECTION, SOLUTION EPIDURAL; INFILTRATION at 08:53

## 2023-10-16 RX ADMIN — TRANEXAMIC ACID 1000 MG: 10 INJECTION, SOLUTION INTRAVENOUS at 08:57

## 2023-10-16 RX ADMIN — TRANEXAMIC ACID 1000 MG: 10 INJECTION, SOLUTION INTRAVENOUS at 09:35

## 2023-10-16 RX ADMIN — Medication 100 MCG: at 10:08

## 2023-10-16 RX ADMIN — MIDAZOLAM 2 MG: 1 INJECTION INTRAMUSCULAR; INTRAVENOUS at 07:29

## 2023-10-16 ASSESSMENT — PAIN SCALES - GENERAL
PAINLEVEL_OUTOF10: 4
PAINLEVEL_OUTOF10: 1
PAINLEVEL_OUTOF10: 1
PAINLEVEL_OUTOF10: 4
PAINLEVEL_OUTOF10: 2
PAINLEVEL_OUTOF10: 0
PAINLEVEL_OUTOF10: 0

## 2023-10-16 ASSESSMENT — PAIN DESCRIPTION - DESCRIPTORS
DESCRIPTORS: ACHING
DESCRIPTORS: PINS AND NEEDLES
DESCRIPTORS: ACHING
DESCRIPTORS: ACHING

## 2023-10-16 ASSESSMENT — PAIN DESCRIPTION - LOCATION
LOCATION: KNEE

## 2023-10-16 ASSESSMENT — PAIN DESCRIPTION - ORIENTATION
ORIENTATION: RIGHT

## 2023-10-16 NOTE — DISCHARGE INSTRUCTIONS
medication possible. Prescriptions must last the length of the prescription, typically 7 days. If you need a refill, call the office. Allow 1-2 business days for processing. Do not take sleep aids with pain pills as this can cause slowed breathing and sudden death. Narcotic pain pills can be addicting. Addiction can happen to anyone. Please contact your doctor if you need help. If you are done taking narcotics, you may discard excess narcotic medications at the following facilities:    [facility]. [facility] [employer] [email or web address] [address] [place] [address]    If you think your pain medicine is making you sick to your stomach: Take your medicine after meals (unless your doctor has told you not to). Ask your doctor for a different pain medicine. If your doctor prescribed antibiotics, take them as directed. Do not stop taking them just because you feel better. You will need to take the full course of antibiotics. Incision care    Follow your doctor's instructions regarding dressing changes and incision care  Total Knee Replacements-remove ace and cotton wrap post op day 1 or 2. Keep incision clean at all times. Wash hands before touching incision. No lotions, creams, or ointments near your incision. Keep pets away from your incision. You may shower 24 to 48 hours after surgery. Pat the incision dry. Don't swim or take a bath until your doctor tells you it is okay. Exercise    Your rehab program will give you a number of exercises to do to help you get back your range of motion and strength. Always do them as your therapist tells you.   If your doctor recommends home therapy, be sure to contact your discharge staff to set up home care  If your doctors recommends outpatient therapy, please be sure to contact your therapy office to make an

## 2023-10-16 NOTE — ANESTHESIA PRE PROCEDURE
Department of Anesthesiology  Preprocedure Note       Name:  Unknown Specter   Age:  58 y.o.  :  1960                                          MRN:  [id number]         Date:  10/16/2023      Surgeon: Latrice Louise):  Joaquin Trejo MD    Procedure: RIGHT TOTAL KNEE ARTHROPLASTY-DEPUY (Right: Knee)    Medications prior to admission:   Prior to Admission medications    Medication Sig Start Date End Date Taking? Authorizing Provider   omeprazole (PRILOSEC) 20 MG delayed release capsule Take 2 capsules by mouth daily    Elmer Ring MD   ketoconazole (NIZORAL) 2 % cream Apply to affected area BID 10/11/23   Umu Hurst MD   fluticasone Clifford Null) 50 MCG/ACT nasal spray 2 sprays by Each Nostril route daily 23   Chepe Culp MD   potassium chloride (KLOR-CON M) 20 MEQ TBCR extended release tablet Take 1 tablet by mouth daily 23   Shashi Raphael MD   atorvastatin (LIPITOR) 20 MG tablet TAKE 1 TABLET BY MOUTH EVERY DAY 23   Shashi Raphael MD   triamterene-hydroCHLOROthiazide (DYAZIDE) 37.5-25 MG per capsule TAKE 1 CAPSULE BY MOUTH DAILY 23   Shashi Raphael MD   olopatadine (PATADAY) 0.2 % SOLN ophthalmic solution Place 1 drop into both eyes daily    Elmer Ring MD   aspirin 325 MG tablet Take 1 tablet by mouth daily    Elmer Ring MD   ibuprofen (ADVIL;MOTRIN) 200 MG tablet Take 1 tablet by mouth every 6 hours as needed for Pain    Elmer Ring MD   loratadine (CLARITIN) 10 MG tablet Take 1 tablet by mouth daily    Elmer Ring MD   Multiple Vitamins-Minerals (CENTRUM CARDIO) TABS Take  by mouth daily. Elmer Ring MD   psyllium 0.52 g capsule Take by mouth 2 times daily     Elmer Ring MD   calcium carbonate (OSCAL) 500 MG TABS tablet Take 1,200 mg by mouth daily     Elmer Ring MD       Current medications:    No current facility-administered medications for this visit.      No current outpatient

## 2023-10-16 NOTE — PROGRESS NOTES
Blocks completed. Patient tolerated procedure with no difficulty. VSS. No distress noted. Continue to monitor.

## 2023-10-16 NOTE — OP NOTE
had  fully cured, we once again trialed the knee and the size 3 x 5 mm  polyethylene insert was found to be appropriate; therefore, a final size  3 x 5 mm polyethylene insert was placed. The tourniquet at that time  was deflated and electrocautery was used to obtain hemostasis within the  soft tissues. The capsular closure was done at that time with a #1  Stratafix suture, 2-0 Vicryl was used to reapproximate the subcutaneous  tissues, and a 4-0 Monocryl was used to close the skin. Sterile  dressings at that time were placed over the knee and the knee was placed  into an Ace wrap. The patient at that time was awakened from the  anesthesia and was transferred to the PACU in a stable condition.         Juan Carlos Walker MD    D: 10/16/2023 10:09:20       T: 10/16/2023 11:17:50     [email or web address]  Job#: [id number]     Doc#: [id number]    CC:

## 2023-10-16 NOTE — PROGRESS NOTES
[address] [facility] Department   Phone: [phone]    Occupational Therapy    [x] Initial Evaluation            [] Daily Treatment Note         [x] Discharge Summary      Patient: Asmita Costello   : 1960   MRN: [id number]   Date of Service:  10/16/2023    Admitting Diagnosis:  Primary osteoarthritis of right knee  Current Admission Summary: s/p R TKA  Past Medical History:  has a past medical history of Allergic, Anemia, Arthritis, Asthma, Cancer ([address]), Colon cancer ([address]), Deaf, left, GERD (gastroesophageal reflux disease), Hearing loss, Hyperlipidemia, Hypertension, IBS (irritable bowel syndrome), Migraine, and PONV (postoperative nausea and vomiting). Past Surgical History:  has a past surgical history that includes Cholecystectomy; Hysterectomy;  section; Septoplasty; other surgical history; Middle ear surgery; right colectomy (3/16/16); knee surgery; Knee arthroscopy (Right, 3/15/2019); Mohs surgery (2020); and Ovary removal.    Discharge Recommendations: Asmita Costello scored a  on the [facility] ADL Inpatient form. At this time, no further OT is recommended upon discharge due to no OT needs at discharge. Recommend patient returns to prior setting with prior services.        DME Required For Discharge: rolling walker    Precautions/Restrictions: medium fall risk, weight bearing  Weight Bearing Restrictions: weight bearing as tolerated  [] Right Upper Extremity  [] Left Upper Extremity [x] Right Lower Extremity  [] Left Lower Extremity     Required Braces/Orthotics: no braces required   [] Right  [] Left  Positional Restrictions:no positional restrictions    Pre-Admission Information   Lives With: spouse                  Type of Home: house  Home Layout: tri-level, 5 steps up from garage entry to main living area with B handrail, 10 steps up from main level to bedroom with B handrail   Home Access: level entry  Bathroom Layout: tub/shower unit, walk in

## 2023-10-16 NOTE — H&P
Date of Surgery Update:  Marla Peace was seen, history and physical examination reviewed, and patient examined by me today. There have been no significant clinical changes since the completion of the previous history and physical.    The risk, benefits, and alternatives of the proposed procedure have been explained to the patient (or appropriate guardian) and understanding verbalized. All questions answered. Patient wishes to proceed.     Electronically signed by: Fabi Mackenzie MD,10/16/2023,8:40 AM

## 2023-10-16 NOTE — PROGRESS NOTES
Pt arrived from OR to PACU bay 7. Reported received from [facility] staff. Pt arousable to voice. Surgical incisions dressings in place to RLE. Pt on RA, NSR, and VSS. Will continue to monitor.

## 2023-10-16 NOTE — ANESTHESIA PROCEDURE NOTES
Peripheral Block    Patient location during procedure: pre-op  Reason for block: post-op pain management  Start time: 10/16/2023 7:31 AM  End time: 10/16/2023 7:33 AM  Staffing  Performed: resident/CRNA   Anesthesiologist: Fan Alaniz MD  Resident/CRNA: JOHN Coy CRNA  Performed by: JOHN Coy CRNA  Authorized by: JOHN Coy CRNA    Preanesthetic Checklist  Completed: patient identified, IV checked, site marked, risks and benefits discussed, surgical/procedural consents, equipment checked, pre-op evaluation, timeout performed, anesthesia consent given, oxygen available, monitors applied/VS acknowledged, fire risk safety assessment completed and verbalized and blood product R/B/A discussed and consented  Peripheral Block   Patient position: supine  Prep: ChloraPrep  Provider prep: mask and sterile gloves  Patient monitoring: cardiac monitor, continuous pulse ox, frequent blood pressure checks, IV access, oxygen and responsive to questions  Block type: Femoral  Adductor canal  Laterality: right  Injection technique: single-shot  Guidance: ultrasound guided  Local infiltration: lidocaine  Infiltration strength: 2 %  Local infiltration: lidocaine  Dose: 1 mL    Needle   Needle type: insulated echogenic nerve stimulator needle   Needle gauge: 21 G  Needle localization: ultrasound guidance  Needle length: 8 cm  Assessment   Injection assessment: negative aspiration for heme, no paresthesia on injection, local visualized surrounding nerve on ultrasound and no intravascular symptoms  Paresthesia pain: none  Slow fractionated injection: yes  Hemodynamics: stable  Real-time US image taken/store: yes  Outcomes: uncomplicated and patient tolerated procedure well    Additional Notes  Final TO 0728  EBL <1ml  Medications Administered  bupivacaine (MARCAINE) PF injection 0.5% - Perineural   20 mL - 10/16/2023 7:37:00 AM

## 2023-10-16 NOTE — PROGRESS NOTES
Discharge instructions review with patient and , Theresa Page. All home medications have been reviewed, pt v/u. Discharge instructions signed. Pt discharged via wheelchair. Pt discharged with walker, filled prescriptions, hearing aides, dentures, talon hose, ice machine, and all belongings.  taking stable pt home.

## 2023-10-16 NOTE — ANESTHESIA PROCEDURE NOTES
Spinal Block    Patient location during procedure: OR  End time: 10/16/2023 8:53 AM  Reason for block: primary anesthetic and at surgeon's request  Staffing  Performed: resident/CRNA   Anesthesiologist: Mendel Peek, MD  Resident/CRNA: JOHN Arellano CRNA  Performed by: JOHN Murillo CRNA  Authorized by:  Mendel Peek, MD    Spinal Block  Patient position: sitting  Prep: ChloraPrep  Patient monitoring: cardiac monitor and continuous pulse ox  Approach: midline  Location: L3/L4  Provider prep: mask and sterile gloves  Local infiltration: lidocaine  Needle  Needle type: Pencan   Needle gauge: 24 G  Needle length: 3.5 in  Expiration date: 10/31/2025  Assessment  CSF: clear  Attempts: 1  Hemodynamics: stable  Additional Notes  Spinal TO 0850  EBL < 1ml  Preanesthetic Checklist  Completed: patient identified, IV checked, site marked, risks and benefits discussed, surgical/procedural consents, equipment checked, pre-op evaluation, timeout performed, anesthesia consent given, oxygen available, monitors applied/VS acknowledged, fire risk safety assessment completed and verbalized and blood product R/B/A discussed and consented

## 2023-10-16 NOTE — PROGRESS NOTES
Pt awake and alert at this time. Pt on RA, and VSS. Pt reports minimal pain and denies nausea, tolerating PO. Skin warm, palpable pulses and able to wiggle toes. Pt meets criteria to be discharged from Phase 1.

## 2023-10-16 NOTE — ANESTHESIA PROCEDURE NOTES
Peripheral Block    Patient location during procedure: pre-op  Reason for block: post-op pain management  Start time: 10/16/2023 7:34 AM  End time: 10/16/2023 7:36 AM  Staffing  Performed: resident/CRNA   Anesthesiologist: Julia Naranjo MD  Resident/CRNA: JOHN Rosario CRNA  Performed by: JOHN Rosario CRNA  Authorized by: JOHN Rosario CRNA    Preanesthetic Checklist  Completed: patient identified, IV checked, site marked, risks and benefits discussed, surgical/procedural consents, equipment checked, pre-op evaluation, timeout performed, anesthesia consent given, oxygen available, monitors applied/VS acknowledged, fire risk safety assessment completed and verbalized and blood product R/B/A discussed and consented  Peripheral Block   Patient position: supine  Prep: ChloraPrep  Provider prep: mask and sterile gloves  Patient monitoring: cardiac monitor, continuous pulse ox, frequent blood pressure checks, IV access, oxygen and responsive to questions  Block type: iPacks  Laterality: right  Injection technique: single-shot  Guidance: ultrasound guided  Local infiltration: lidocaine  Infiltration strength: 2 %  Local infiltration: lidocaine  Dose: 1 mL    Needle   Needle type: insulated echogenic nerve stimulator needle   Needle gauge: 21 G  Needle localization: ultrasound guidance  Needle length: 8 cm  Assessment   Injection assessment: negative aspiration for heme, no paresthesia on injection, local visualized surrounding nerve on ultrasound and no intravascular symptoms  Paresthesia pain: none  Slow fractionated injection: yes  Hemodynamics: stable  Real-time US image taken/store: yes  Outcomes: uncomplicated and patient tolerated procedure well    Additional Notes  Final TO 0728  EBL < 1ml  Medications Administered  bupivacaine (MARCAINE) injection 0.25% - Perineural   20 mL - 10/16/2023 7:34:00 AM

## 2023-10-16 NOTE — PROGRESS NOTES
Physical [facility] Department   Phone: [phone]    Physical Therapy    [x] Initial Evaluation            [] Daily Treatment Note         [] Discharge Summary      Patient: Tasha Garnica   : 1960   MRN: [id number]   Date of Service:  10/16/2023  Admitting Diagnosis: Primary osteoarthritis of right knee  Current Admission Summary: Tasha Garnica is a 58year old female who is s/p R TKA with Dr. Luisito Garrett on 10/16/23. Past Medical History:  has a past medical history of Allergic, Anemia, Arthritis, Asthma, Cancer ([address]), Colon cancer ([address]), Deaf, left, GERD (gastroesophageal reflux disease), Hearing loss, Hyperlipidemia, Hypertension, IBS (irritable bowel syndrome), Migraine, and PONV (postoperative nausea and vomiting). Past Surgical History:  has a past surgical history that includes Cholecystectomy; Hysterectomy;  section; Septoplasty; other surgical history; Middle ear surgery; right colectomy (3/16/16); knee surgery; Knee arthroscopy (Right, 3/15/2019); Mohs surgery (2020); and Ovary removal.  Discharge Recommendations: Tasha Garnica scored a 18/24 on the AM-PAC short mobility form. Current research shows that an AM-PAC score of 18 or greater is typically associated with a discharge to the patient's home setting. Based on the patient's AM-PAC score and their current functional mobility deficits, it is recommended that the patient have 2-3 sessions per week of Physical Therapy at d/c to increase the patient's independence. At this time, this patient demonstrates the endurance and safety to discharge home with OPPT and a follow up treatment frequency of 2-3x/wk. Please see assessment section for further patient specific details. If patient discharges prior to next session this note will serve as a discharge summary. Please see below for the latest assessment towards goals.      DME Required For Discharge: rolling walker, (present during

## 2023-10-16 NOTE — ANESTHESIA POSTPROCEDURE EVALUATION
Department of Anesthesiology  Postprocedure Note    Patient: Cesia Colorado  MRN: [id number]  YOB: 1960  Date of evaluation: 10/16/2023      Procedure Summary     Date: 10/16/23 Room / Location: [address]    Anesthesia Start: 2949 Anesthesia Stop: 5370    Procedure: RIGHT TOTAL KNEE ARTHROPLASTY-DEPUY (Right: Knee) Diagnosis:       Osteoarthritis of right knee, unspecified osteoarthritis type      (Osteoarthritis of right knee, unspecified osteoarthritis type [M17.11])    Surgeons: Marita Hitchcock MD Responsible Provider: Uzma Asif MD    Anesthesia Type: Regional, MAC, Spinal ASA Status: 3          Anesthesia Type: Regional, MAC, Spinal    Conchis Phase I: Conchis Score: 10    Conchis Phase II:        Anesthesia Post Evaluation    Patient location during evaluation: PACU  Patient participation: complete - patient participated  Level of consciousness: awake  Airway patency: patent  Nausea & Vomiting: no vomiting and no nausea  Complications: no  Cardiovascular status: hemodynamically stable  Respiratory status: acceptable  Hydration status: stable  Multimodal analgesia pain management approach  Pain management: adequate

## 2023-10-17 ENCOUNTER — HOSPITAL ENCOUNTER (OUTPATIENT)
Dept: PHYSICAL THERAPY | Age: 63
Setting detail: THERAPIES SERIES
Discharge: HOME | End: 2023-10-17
Payer: COMMERCIAL

## 2023-10-17 PROCEDURE — 97110 THERAPEUTIC EXERCISES: CPT

## 2023-10-17 PROCEDURE — 97164 PT RE-EVAL EST PLAN CARE: CPT

## 2023-10-17 NOTE — PLAN OF CARE
[address] [place] [address] and Therapy hospitals, [address] office: [phone] fax: [phone]      Physical Therapy: TREATMENT/PROGRESS NOTE   Patient: Piotr Yuan (82 y.o. female)   Treatment Date: 10/17/2023   :  1960 MRN: [id number]   Visit #: 3   Insurance Allowable Auth Needed   60/yr  0 used as of 10/9/23 []Yes    [x]No    Insurance: Payor: UMR / Plan: UMR / Product Type: *No Product type* /   Insurance ID: [id number] - (Commercial)  Secondary Insurance (if applicable):    Treatment Diagnosis:     ICD-10-CM    1. Gait abnormality  R26.9       2. Pain  R52       3. Weakness  R53.1       4. Balance disorder  R26.89          Medical Diagnosis:    Unilateral primary osteoarthritis, right knee [M17.11]   Referring Physician: Su Faustin MD  PCP: Jayson Ahumada MD                             Plan of care signed (Y/N):     Date of Patient follow up with Physician: 10/31/23, first follow up since sx on 10/16/23     Progress Report/POC: YES  POC update due: 2023 (OR 10 visits /OR [address], whichever is less)    Latex Allergy:  [x]NO      []YES    Preferred Language for Healthcare:   [x]English       []other:    SUBJECTIVE EXAMINATION     Patient Report/Comments: Pt reports feeling good overall and reports movement helps with pain. Most pain is located in thigh, bad achy feeling. Pt did not sleep well last night due to sleeping on her back. Pt stated she was woke up by muscle cramps and repeatedly got up to move throughout the night to decrease the pain. Pt reports her biggest complaint currently is feeling nauseated throughout the day, all the time; food seems to help decrease this. Stairs are going well at home, one step at a time with minimal assist from ; pt keeps one walker on first floor and other walker on second floor.  Pt is taking oxy, diazepam and tylenol, antibiotic, and antiinflammatory; pt reports oxy seems to help

## 2023-10-19 RX ORDER — TRIAMTERENE AND HYDROCHLOROTHIAZIDE 37.5; 25 MG/1; MG/1
1 CAPSULE ORAL DAILY
Qty: 90 CAPSULE | Refills: 2 | OUTPATIENT
Start: 2023-10-19

## 2023-10-20 ENCOUNTER — HOSPITAL ENCOUNTER (OUTPATIENT)
Dept: PHYSICAL THERAPY | Age: 63
Setting detail: THERAPIES SERIES
Discharge: HOME | End: 2023-10-20
Payer: COMMERCIAL

## 2023-10-20 PROCEDURE — 97110 THERAPEUTIC EXERCISES: CPT

## 2023-10-20 PROCEDURE — 97140 MANUAL THERAPY 1/> REGIONS: CPT

## 2023-10-20 NOTE — FLOWSHEET NOTE
[address] and Therapy [facility], [address] [facility], [facility] [address] [place] [address] office: [phone] fax: [phone]      Physical Therapy: TREATMENT/PROGRESS NOTE   Patient: Uriel Hernandez (67 y.o. female)   Treatment Date: 10/20/2023   :  1960 MRN: [id number]   Visit #: 4   Insurance Allowable Auth Needed   60/yr  0 used as of 10/9/23 []Yes    [x]No    Insurance: Payor: UMR / Plan: UMR / Product Type: *No Product type* /   Insurance ID: [id number] - (Commercial)  Secondary Insurance (if applicable):    Treatment Diagnosis:     ICD-10-CM    1. Gait abnormality  R26.9       2. Pain  R52       3. Weakness  R53.1       4. Balance disorder  R26.89          Medical Diagnosis:    Unilateral primary osteoarthritis, right knee [M17.11]   Referring Physician: Odalis Yi MD  PCP: Elias Valencia MD                             Plan of care signed (Y/N):     Date of Patient follow up with Physician: 10/31/23, first follow up since sx on 10/16/23     Progress Report/POC: NO  POC update due: 2023 (OR 10 visits /OR [address], whichever is less)    Latex Allergy:  [x]NO      []YES    Preferred Language for Healthcare:   [x]English       []other:    SUBJECTIVE EXAMINATION     Patient Report/Comments: Pt reports having a rough few days since her surgery. Reports after the surgery she felt the most pain on the anterior aspect of knee and thigh and now it's on the posterior aspect of knee and thigh. She reports she feels the most pain when sitting (topher when in the car) or laying down and feeling a lot of pressure on the posterior thigh. Reports she likes her ice machine and feels like it's helping with the swelling. OBJECTIVE EXAMINATION     Observation:   10/20: demo'd heel strike B/L with minimal knee flexion on R while ambulating with RW.     Test measurements:      Test used Initial score  10/9/23 10/20/2023   Pain Summary VAS 6 8-9/10 currently    Functional

## 2023-10-23 RX ORDER — TRIAMTERENE AND HYDROCHLOROTHIAZIDE 37.5; 25 MG/1; MG/1
1 CAPSULE ORAL DAILY
Qty: 90 CAPSULE | Refills: 2 | OUTPATIENT
Start: 2023-10-23

## 2023-10-24 ENCOUNTER — HOSPITAL ENCOUNTER (OUTPATIENT)
Dept: PHYSICAL THERAPY | Age: 63
Setting detail: THERAPIES SERIES
Discharge: HOME | End: 2023-10-24
Payer: COMMERCIAL

## 2023-10-24 ENCOUNTER — OFFICE VISIT (OUTPATIENT)
Dept: DERMATOLOGY | Age: 63
End: 2023-10-24
Payer: COMMERCIAL

## 2023-10-24 DIAGNOSIS — L57.0 AK (ACTINIC KERATOSIS): ICD-10-CM

## 2023-10-24 DIAGNOSIS — L66.9 ALOPECIA, SCARRING: ICD-10-CM

## 2023-10-24 DIAGNOSIS — Z85.828 HISTORY OF BASAL CELL CANCER: ICD-10-CM

## 2023-10-24 DIAGNOSIS — L82.1 SEBORRHEIC KERATOSES: ICD-10-CM

## 2023-10-24 DIAGNOSIS — L82.0 SEBORRHEIC KERATOSES, INFLAMED: ICD-10-CM

## 2023-10-24 DIAGNOSIS — D48.5 NEOPLASM OF UNCERTAIN BEHAVIOR OF SKIN: Primary | ICD-10-CM

## 2023-10-24 DIAGNOSIS — D22.9 BENIGN NEVUS: ICD-10-CM

## 2023-10-24 PROCEDURE — 99213 OFFICE O/P EST LOW 20 MIN: CPT | Performed by: DERMATOLOGY

## 2023-10-24 PROCEDURE — 97112 NEUROMUSCULAR REEDUCATION: CPT

## 2023-10-24 PROCEDURE — 17000 DESTRUCT PREMALG LESION: CPT | Performed by: DERMATOLOGY

## 2023-10-24 PROCEDURE — 97110 THERAPEUTIC EXERCISES: CPT

## 2023-10-24 PROCEDURE — 97116 GAIT TRAINING THERAPY: CPT

## 2023-10-24 PROCEDURE — 11102 TANGNTL BX SKIN SINGLE LES: CPT | Performed by: DERMATOLOGY

## 2023-10-24 PROCEDURE — 17003 DESTRUCT PREMALG LES 2-14: CPT | Performed by: DERMATOLOGY

## 2023-10-24 PROCEDURE — 17110 DESTRUCTION B9 LES UP TO 14: CPT | Performed by: DERMATOLOGY

## 2023-10-24 RX ORDER — OXYCODONE HYDROCHLORIDE 5 MG/1
TABLET ORAL
COMMUNITY
Start: 2023-10-23

## 2023-10-24 RX ORDER — CYCLOBENZAPRINE HCL 10 MG
TABLET ORAL
COMMUNITY
Start: 2023-10-23

## 2023-10-24 NOTE — PROGRESS NOTES
[facility]) Dermatology  Ama Hager M.D.  [phone]       Fartun Andrew  1960    58 y.o. female     Date of Visit: 10/24/2023    Chief Complaint:   Chief Complaint   Patient presents with    Skin Lesion     6 mo FSE     FYI: right knee replacement last week        I was asked to see this patient by Dr. Venegas ref. provider found. History of Present Illness:  1. TBSE    Multiple nevi. Patient has not noticed any new or changing pigmented lesions. Stable in size, shape, color. Not itching, bleeding. Alopecia treated with intralesional Kenalog-stable, no new areas of alopecia, old areas quiescent without scale at margin. Irritated seborrheic keratoses right inframammary chest medially. Slowly increasing in size, becoming irritated. Other seborrheic keratoses torso asymptomatic    Dry papule left forehead, right eyebrow, right vermilion of lower lip. Not itching, bleeding. Asymptomatic    Knee replacement 1 week ago-patient checked with orthopedic surgeon last week-no need for preoperative antibiotics for clean skin biopsies but does need preoperative antibiotics for dentist          Watches 6 grandchildren every day during the summer, taking care of parents in her 90s-dementia     Skin history:  History of basal cell carcinoma left chest and right forearm      12/16 Cutaneous sarcoid with no evidence of systemic involvement. Developed at site of IV. Tissue cultures negative.   5/17 superficial basal cell carcinoma central chest status post curettage  3/20 superficial basal cell carcinoma left medial lower leg  3/20 superficial basal cell carcinoma right medial lower leg  3/20 superficial basal cell carcinoma glabella status post Mohs 6/1/2020 (COVID)  5/20 left thenar forearm superficial basal cell carcinoma status post curettage      Seborrheic dermatitis-triamcinolone 0.025% cream or Elidel 1% cream     Rosacea-MetroGel 0.75%     Family history positive for nonmelanoma skin cancer in both

## 2023-10-24 NOTE — FLOWSHEET NOTE
[address] [place] [address] and Therapy [facility], [address]  [facility] office: [phone] fax: [phone]      Physical Therapy: TREATMENT/PROGRESS NOTE   Patient: Marla Peace (58 y.o. female)   Treatment Date: 10/24/2023   :  1960 MRN: [id number]   Visit #: 5   Insurance Allowable Auth Needed   60/yr  0 used as of 10/9/23 []Yes    [x]No    Insurance: Payor: UMR / Plan: UMR / Product Type: *No Product type* /   Insurance ID: [id number] - (Commercial)  Secondary Insurance (if applicable):    Treatment Diagnosis:     ICD-10-CM    1. Gait abnormality  R26.9       2. Pain  R52       3. Weakness  R53.1       4. Balance disorder  R26.89          Medical Diagnosis:    Unilateral primary osteoarthritis, right knee [M17.11]   Referring Physician: Fabi Mackenzie MD  PCP: Giancarlo Smith MD                             Plan of care signed (Y/N):     Date of Patient follow up with Physician: 10/31/23, first follow up since sx on 10/16/23     Progress Report/POC: NO  POC update due: 2023 (OR 10 visits /OR Ruth Smith, whichever is less)    Latex Allergy:  [x]NO      []YES    Preferred Language for Healthcare:   [x]English       []other:    SUBJECTIVE EXAMINATION     Patient Report/Comments: Pt reports she feels more stiff when sitting so she prefers to stand and walk around. Pt reports she still feels a lot of pain, topher at night and has been spacing out her pain meds to help. OBJECTIVE EXAMINATION     Observation:   10/20: demo'd heel strike B/L with minimal knee flexion on R while ambulating with RW.     Test measurements:      Test used Initial score  10/9/23 10/24/2023   Pain Summary VAS 6 9/10   Functional questionnaire WOMAC 71 / 74% 53 / 55% (10/17/23)   Other:              SURGERY:     Scheduled Date: R TKA     10/16/23   Hospital D/C  recommendations Outpatient therapy     Comments:      Functional Testing Prehab  Date 10/9/2023 Post-op Re-Eval Date

## 2023-10-27 ENCOUNTER — HOSPITAL ENCOUNTER (OUTPATIENT)
Dept: PHYSICAL THERAPY | Age: 63
Setting detail: THERAPIES SERIES
Discharge: HOME | End: 2023-10-27
Payer: COMMERCIAL

## 2023-10-27 LAB — DERMATOLOGY PATHOLOGY REPORT: NORMAL

## 2023-10-27 PROCEDURE — 97110 THERAPEUTIC EXERCISES: CPT

## 2023-10-27 PROCEDURE — 97140 MANUAL THERAPY 1/> REGIONS: CPT

## 2023-10-27 NOTE — FLOWSHEET NOTE
Post-op Re-Eval Date 10/17/2023 6 weeks from surgery  Date  D/C   Date        Current AD use None Rolling walker       TUG (sec)    9.5 sec    10/20:  23.85 sec w/ RW             30 second sit to stand w/out UE (reps) 8 10/20:   6 reps        Gait speed (m/s)  4x10m fast walk  Result = 40m/total time 1.18 m/sec 0.43 m/sec              Supine Hip/Knee AROM L R L R L R L R   Hip Flexion                   Knee Flexion 126 127   100            Knee Extension 0 1    3               WOMAC (raw) 71  55          MMT not tested during post-op re-eval on 10/17, secondary to increased pain.     10/20:  AROM ext: lacking 5 deg  PROM ext: neutral  AAROM flex: 120 deg    10/24:  AROM ext: lacking 5 deg  PROM ext: neutral  AROM flex: 115 deg    RESTRICTIONS/PRECAUTIONS: hx of colon cancer, HTN, R TKA sx date: 10/16/23    Exercises/Interventions:     Therapeutic Ex (04985)  resistance Sets/time Reps Notes/Cues/Progressions   Bike  5 min  10/20, 10/24: reverse revs   IB  HR/TR  30\"  2 2  10    HSS   20\" 5 10/27: with heel propped and with strap   Knee flexion at stairs -R only  30\" 2    Supine quad set     Heel slides w/strap  10\" 10 10/20: PT assist to hold flexed position   Quad set with heel prop  10\" 10 10/27   LAQ       Hamstring curls  2 10 10/27                              Manual Intervention (96663)  TIME     PROM of knee ext/knee flex w/ intermittent STM of gastroc and hamstrings   10/20   PROM of knee flexion sitting at [facility] and PROM knee ext in supine     Patellar mobs  3'  10/27   Post tib-fem glides to inc ext and flexion  6x10\" ea  10/27          NMR re-education (56729) resistance Sets/time Reps CUES NEEDED   NBOS  - EO  - EC       CC- Standing TKE 2 plates     Square Tilt board  - A/P taps  - A/P balance  - M/L taps  - M/L balance  10/24: B finger tips for a/P and M/L balance   Tandem  30\" 2 10/27                        Therapeutic Activity (35376)/ Gait   Sets/time     Step ups w/ B HR  - fwd - R only    Sit to

## 2023-10-31 ENCOUNTER — HOSPITAL ENCOUNTER (OUTPATIENT)
Dept: PHYSICAL THERAPY | Age: 63
Setting detail: THERAPIES SERIES
Discharge: HOME | End: 2023-10-31
Payer: COMMERCIAL

## 2023-10-31 PROCEDURE — 97110 THERAPEUTIC EXERCISES: CPT

## 2023-10-31 PROCEDURE — 97140 MANUAL THERAPY 1/> REGIONS: CPT

## 2023-10-31 NOTE — FLOWSHEET NOTE
Progression has been slowed due to co-morbidities. [x] Plan just implemented, too soon (<30days) to assess goals progression   [] Goals require adjustment due to lack of progress  [] Patient is not progressing as expected and requires additional follow up with physician  [] Other:     CHARGE CAPTURE     CHARGE GRID   CPT Code (TIMED) minutes # CPT Code (UNTIMED) #     [x] Therex (03640)  25 2  [] EVAL:MODERATE (71439 - Typically 30 minutes face-to-face)     [] Neuromusc. Re-ed (16540) 5   [] Re-Eval (36058)     [x] Manual (10095) 8 1  [] Estim Unattended (89279)     [] Ther. Act (50798) 5   [] [facility] T[facility] (88058)     [] Gait (03445)    [] Dry Needle 1-2 muscle (93427)     [] Aquatic Therex (56883)    [] Dry Needle 3+ muscle (04765)     [] Iontophoresis (34851)    [] VASO (07091)     [] Ultrasound (49279)    [] Group Therapy (85725)     [] Estim Attended (06696)    [] Other: Total Timed Code Tx Minutes 43 3       Total Treatment Minutes 43     Charge Justification:  (05377) THERAPEUTIC EXERCISE - Provided verbal/tactile cueing for activities related to strengthening, flexibility, endurance, ROM performed to prevent loss of range of motion, maintain or improve muscular strength or increase flexibility, following either an injury or surgery.    (62339) MANUAL THERAPY-  Manual therapy techniques, 1 or more regions, each 15 minutes (Mobilization/manipulation, manual lymphatic drainage, manual traction) for the purpose of modulating pain, promoting relaxation,  increasing ROM, reducing/eliminating soft tissue swelling/inflammation/restriction, improving soft tissue extensibility and allowing for proper ROM for normal function with self care, mobility, lifting and ambulation    TREATMENT PLAN   Plan: Cont POC- Continue emphasis/focus on exercise progression, modulating pain, reduce/eliminate soft tissue swelling/inflammation/restriction, improving soft tissue extensibility, allowing for proper ROM, and static and

## 2023-11-03 ENCOUNTER — HOSPITAL ENCOUNTER (OUTPATIENT)
Dept: PHYSICAL THERAPY | Age: 63
Setting detail: THERAPIES SERIES
Discharge: HOME | End: 2023-11-03
Payer: COMMERCIAL

## 2023-11-03 PROCEDURE — 97140 MANUAL THERAPY 1/> REGIONS: CPT

## 2023-11-03 PROCEDURE — 97110 THERAPEUTIC EXERCISES: CPT

## 2023-11-03 PROCEDURE — 97530 THERAPEUTIC ACTIVITIES: CPT

## 2023-11-03 NOTE — FLOWSHEET NOTE
Progression has been slowed due to co-morbidities. [x] Plan just implemented, too soon (<30days) to assess goals progression   [] Goals require adjustment due to lack of progress  [] Patient is not progressing as expected and requires additional follow up with physician  [] Other:     CHARGE CAPTURE     CHARGE GRID   CPT Code (TIMED) minutes # CPT Code (UNTIMED) #     [x] Therex (40244)  18 1  [] EVAL:MODERATE (80805 - Typically 30 minutes face-to-face)     [] Neuromusc. Re-ed (08377) 5   [] Re-Eval (57035)     [x] Manual (95671) 8 1  [] Estim Unattended (76926)     [x] Ther. Act (11779) 12 1  [] Mech. Traction (42087)     [] Gait (97784)    [] Dry Needle 1-2 muscle (86140)     [] Aquatic Therex (71215)    [] Dry Needle 3+ muscle (32653)     [] Iontophoresis (29893)    [] VASO (20205)     [] Ultrasound (72172)    [] Group Therapy (53455)     [] Estim Attended (25621)    [] Other: Total Timed Code Tx Minutes 43 3       Total Treatment Minutes 43     Charge Justification:  (38961) THERAPEUTIC EXERCISE - Provided verbal/tactile cueing for activities related to strengthening, flexibility, endurance, ROM performed to prevent loss of range of motion, maintain or improve muscular strength or increase flexibility, following either an injury or surgery. (80662) NEUROMUSCULAR RE-EDUCATION- Therapeutic procedure, 1 or more areas, each 15 minutes; neuromuscular reeducation of movement, balance, coordination, kinesthetic sense, posture, and/or proprioception for sitting and/or standing activities  (80185) THERAPEUTIC ACTIVITY- use of dynamic activities to improve functional performance. (Ex include squatting, ascending/descending stairs, walking, bending, lifting, catching, throwing, pushing, pulling, jumping.)  Direct, one on one contact, billed in 15-minute increments.   (95058) MANUAL THERAPY-  Manual therapy techniques, 1 or more regions, each 15 minutes (Mobilization/manipulation, manual lymphatic drainage, manual

## 2023-11-07 ENCOUNTER — HOSPITAL ENCOUNTER (OUTPATIENT)
Dept: PHYSICAL THERAPY | Age: 63
Setting detail: THERAPIES SERIES
Discharge: HOME | End: 2023-11-07
Payer: COMMERCIAL

## 2023-11-07 PROCEDURE — 97110 THERAPEUTIC EXERCISES: CPT

## 2023-11-07 PROCEDURE — 97140 MANUAL THERAPY 1/> REGIONS: CPT

## 2023-11-07 PROCEDURE — 97530 THERAPEUTIC ACTIVITIES: CPT

## 2023-11-10 ENCOUNTER — HOSPITAL ENCOUNTER (OUTPATIENT)
Dept: PHYSICAL THERAPY | Age: 63
Setting detail: THERAPIES SERIES
Discharge: HOME | End: 2023-11-10
Payer: COMMERCIAL

## 2023-11-10 PROCEDURE — 97140 MANUAL THERAPY 1/> REGIONS: CPT

## 2023-11-10 PROCEDURE — 97110 THERAPEUTIC EXERCISES: CPT

## 2023-11-10 NOTE — FLOWSHEET NOTE
Not Met: [] Adjusted     Therapist goals for Patient:   Short Term Goals: To be achieved in: 1-2 visit(s)  1. Independent in HEP and progression per patient tolerance, in order to prevent re-injury. [] Progressing: [x] Met: 10/17/23 [] Not Met: [] Adjusted  2. All patient questions regarding expectations for rehab following upcoming surgery are answered. [] Progressing: [x] Met:10/17/23  [] Not Met: [] Adjusted    Short Term Goals: To be achieved in: 2 weeks  Independent in HEP and progression per patient tolerance, in order to progress toward full function and prevent re-injury. Status: [] Progressing: [] Met: [] Not Met: [] Adjusted  Patient will have a decrease in pain to 2/10 to help  facilitate improvement in movement, function, and ADLs as indicated by functional deficits. Status: [] Progressing: [] Met: [] Not Met: [] Adjusted    Long Term Goals: To be achieved in: 10 weeks  Disability index score of 40% or less for the [facility] to assist with return top prior level of function. Status: [] Progressing: [] Met: [] Not Met: [] Adjusted  LLE AROM = RLE AROM to allow for proper joint functioning as indicated by patients functional deficits. Status: [] Progressing: [] Met: [] Not Met: [] Adjusted  Pt to improve strength to 4+/5 or better of proximal hip, quadriceps, gastroc/soleus, and hamstringsto allow for proper muscle and joint use in functional mobility, ADLs and prior level of function  Status: [] Progressing: [] Met: [] Not Met: [] Adjusted  Patient will return to squatting, light home activities, getting in/out of a car, walking on uneven ground, walk 1 mile, up/down 1 flight of stairs, stand for length of time, and sit for length of time without increased symptoms or restriction to work towards return to prior level of function. Status: [] Progressing: [] Met: [] Not Met: [] Adjusted  Patient will resume normal housework/leisure activities without pain.             Status: [] Progressing: []

## 2023-11-14 ENCOUNTER — HOSPITAL ENCOUNTER (OUTPATIENT)
Dept: PHYSICAL THERAPY | Age: 63
Setting detail: THERAPIES SERIES
Discharge: HOME | End: 2023-11-14
Payer: COMMERCIAL

## 2023-11-14 PROCEDURE — 97112 NEUROMUSCULAR REEDUCATION: CPT

## 2023-11-14 PROCEDURE — 97110 THERAPEUTIC EXERCISES: CPT

## 2023-11-14 NOTE — PLAN OF CARE
initial POC   [x]Other: Will complete 6 week post-op reassessment on/around 23. Total Visits: 11     Recommendation:    [x] Continue PT 2x / wk for 4 weeks - remainder of current POC   [] Hold PT, pending MD visit   [] Discharge to [facility]. Follow up with PT or MD PRN. Physical Therapy: TREATMENT/PROGRESS NOTE   Patient: Unknown Specter (40 y.o. female)   Treatment Date: 2023   :  1960 MRN: [id number]   Visit #: 11   Insurance Allowable Auth Needed   60/yr  0 used as of 10/9/23 []Yes    [x]No    Insurance: Payor: UMR / Plan: UMR / Product Type: *No Product type* /   Insurance ID: [id number] - (Commercial)  Secondary Insurance (if applicable):    Treatment Diagnosis:     ICD-10-CM    1. Gait abnormality  R26.9       2. Pain  R52       3. Weakness  R53.1       4. Balance disorder  R26.89          Medical Diagnosis:    Unilateral primary osteoarthritis, right knee [M17.11]   Referring Physician: Joaquin Trejo MD  PCP: Shashi Raphael MD                             Plan of care signed (Y/N):   - Resent 23     Date of Patient follow up with Physician: JAIME TKA scheduled for 2024     Progress Report/POC: YES and Date Range for this report: 10/9/23 - 23  POC update due: 2023 (OR 10 visits /OR Loulou Macias, whichever is less)    Latex Allergy:  [x]NO      []YES    Preferred Language for Healthcare:   [x]English       []other:    SUBJECTIVE EXAMINATION     Patient Report/Comments: Pt reports she has had a crazy day but the knee is feeling okay.      OBJECTIVE EXAMINATION     Observation:   10/20: demo'd heel strike B/L with minimal knee flexion on R while ambulating with RW.  10/31: pt arrived to PT without AD, ambulating independently with mild limp    Test measurements:      Test used Initial score  10/9/23 2023   Pain Summary VAS 6 0-1/10   Functional questionnaire WOMAC 71 / 74% 53 / 55% (10/17/23)   Other:   AROM: 0-130           SURGERY:     Scheduled Date: R

## 2023-11-15 RX ORDER — TRIAMTERENE AND HYDROCHLOROTHIAZIDE 37.5; 25 MG/1; MG/1
1 CAPSULE ORAL DAILY
Qty: 90 CAPSULE | Refills: 1 | Status: SHIPPED | OUTPATIENT
Start: 2023-11-15

## 2023-11-17 ENCOUNTER — HOSPITAL ENCOUNTER (OUTPATIENT)
Dept: PHYSICAL THERAPY | Age: 63
Setting detail: THERAPIES SERIES
Discharge: HOME | End: 2023-11-17
Payer: COMMERCIAL

## 2023-11-17 PROCEDURE — 97110 THERAPEUTIC EXERCISES: CPT

## 2023-11-17 NOTE — FLOWSHEET NOTE
complications  [] Other:     Return to Play: NA    Prognosis for POC: [x] Good [] Fair  [] Poor    Patient requires continued skilled intervention: [x] Yes  [] No    GOALS     GOALS:  Patient stated goal: \"steps\" \"sleeping\" and added 10/17/23: \"do things without pain\"  [] Progressing: [] Met: [] Not Met: [] Adjusted     Therapist goals for Patient:   Short Term Goals: To be achieved in: 1-2 visit(s)  1. Independent in HEP and progression per patient tolerance, in order to prevent re-injury. [] Progressing: [x] Met: 10/17/23 [] Not Met: [] Adjusted  2. All patient questions regarding expectations for rehab following upcoming surgery are answered. [] Progressing: [x] Met:10/17/23  [] Not Met: [] Adjusted    Short Term Goals: To be achieved in: 2 weeks  Independent in HEP and progression per patient tolerance, in order to progress toward full function and prevent re-injury. Status: [] Progressing: [x] Met: [] Not Met: [] Adjusted  Patient will have a decrease in pain to 2/10 to help  facilitate improvement in movement, function, and ADLs as indicated by functional deficits. Status: [x] Progressing: [] Met: [] Not Met: [] Adjusted    Long Term Goals: To be achieved in: 10 weeks  Disability index score of 40% or less for the [facility] to assist with return top prior level of function. Status: [x] Progressing: [] Met: [] Not Met: [] Adjusted  LLE AROM = RLE AROM to allow for proper joint functioning as indicated by patients functional deficits.   Status: [x] Progressing: [] Met: [] Not Met: [] Adjusted  Pt to improve strength to 4+/5 or better of proximal hip, quadriceps, gastroc/soleus, and hamstringsto allow for proper muscle and joint use in functional mobility, ADLs and prior level of function  Status: [x] Progressing: [] Met: [] Not Met: [] Adjusted  Patient will return to squatting, light home activities, getting in/out of a car, walking on uneven ground, walk 1 mile, up/down 1 flight of stairs, stand for

## 2023-11-20 ENCOUNTER — OFFICE VISIT (OUTPATIENT)
Dept: FAMILY MEDICINE CLINIC | Age: 63
End: 2023-11-20
Payer: COMMERCIAL

## 2023-11-20 ENCOUNTER — TELEPHONE (OUTPATIENT)
Dept: FAMILY MEDICINE CLINIC | Age: 63
End: 2023-11-20

## 2023-11-20 VITALS
HEART RATE: 113 BPM | SYSTOLIC BLOOD PRESSURE: 144 MMHG | WEIGHT: 140.5 LBS | TEMPERATURE: 98 F | DIASTOLIC BLOOD PRESSURE: 90 MMHG | BODY MASS INDEX: 24.89 KG/M2 | RESPIRATION RATE: 12 BRPM | OXYGEN SATURATION: 96 %

## 2023-11-20 DIAGNOSIS — S91.052A DOG BITE OF LEFT ANKLE, INITIAL ENCOUNTER: Primary | ICD-10-CM
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
## 2023-11-20 PROCEDURE — 3080F DIAST BP >= 90 MM HG: CPT | Performed by: FAMILY MEDICINE

## 2023-11-20 PROCEDURE — 99213 OFFICE O/P EST LOW 20 MIN: CPT | Performed by: FAMILY MEDICINE

## 2023-11-20 PROCEDURE — 3077F SYST BP >= 140 MM HG: CPT | Performed by: FAMILY MEDICINE

## 2023-11-20 RX ORDER — METRONIDAZOLE 500 MG/1
500 TABLET ORAL 3 TIMES DAILY
Qty: 15 TABLET | Refills: 0 | Status: SHIPPED | OUTPATIENT
Start: 2023-11-20 | End: 2023-11-25

## 2023-11-20 RX ORDER — CEFUROXIME AXETIL 500 MG/1
500 TABLET ORAL 2 TIMES DAILY
Qty: 14 TABLET | Refills: 0 | Status: SHIPPED | OUTPATIENT
Start: 2023-11-20 | End: 2023-11-27

## 2023-11-20 NOTE — TELEPHONE ENCOUNTER
Freda Monk is requesting a call. She was bitten by a dog on Saturday. I have attempted to make her an appointment and we do not have any available. She would prefer not to go to an Urgent care. She can be reached at [phone]. Please advise.

## 2023-11-20 NOTE — PROGRESS NOTES
Subjective:      Patient ID: Sana Glasgow is a 58 y.o. female. CC: Patient presents for acute medical problem-dog bite. Medical assistant notes reviewed. Animal Bite   Episode onset: Saturday. The incident occurred at another residence. There is an injury to the Left ankle. The pain is mild. It is unlikely that a foreign body is present. There have been no prior injuries to these areas. Her tetanus status is UTD. Pt has been using neosporin twice a day. Patient was bitten by friend's dog. Review of Systems  Allergies   Allergen Reactions    Ace Inhibitors      Lip swelling    Lisinopril Hives    Penicillins Hives    Bacitracin Rash    Morphine Nausea And Vomiting     Violently ill      Objective:   Physical Exam  Vitals and nursing note reviewed. Constitutional:       General: She is not in acute distress. Appearance: She is well-developed. Skin:     General: Skin is warm. Findings: Abrasion, bruising and laceration present. Comments: Lateral aspect of the ankle with an abraded site and some bruising. Inner aspect of ankle there is an obvious open laceration of approximately 1 cm. No signs of infection. Full range of motion of the ankle. Neurological:      Mental Status: She is alert. Psychiatric:         Behavior: Behavior is cooperative. Assessment:      Lauren White was seen today for animal bite. Diagnoses and all orders for this visit:    Dog bite of left ankle, initial encounter    Other orders  -     cefUROXime (CEFTIN) 500 MG tablet; Take 1 tablet by mouth 2 times daily for 7 days  -     metroNIDAZOLE (FLAGYL) 500 MG tablet; Take 1 tablet by mouth 3 times daily for 5 days            Plan:      Continue with topical antibiotic and watch for further signs of infection    Because of her recent knee replacement surgery we will go ahead and start antibiotic prophylaxis    RTC as needed    Please note that this chart was generated using Dragon dictation software.  Although

## 2023-11-20 NOTE — TELEPHONE ENCOUNTER
Pt states that she was bitten by a dog on Saturday on her left ankle, some broken skin on one side, the other side has pin prick(some bleeding). Pt is concern with infection as she had her right knee replaced on 10/16/23 and will have her left knee replace in January. Pt called the surgeons and they are concern with her getting a bad infection. No appts available today. Pt will like to know if atb can be send for her or what else can be done. There was an opening tomorrow at 2pm which pt schedule appt.   Please advise

## 2023-11-21 ENCOUNTER — HOSPITAL ENCOUNTER (OUTPATIENT)
Dept: PHYSICAL THERAPY | Age: 63
Setting detail: THERAPIES SERIES
Discharge: HOME | End: 2023-11-21
Payer: COMMERCIAL

## 2023-11-21 PROCEDURE — 97110 THERAPEUTIC EXERCISES: CPT

## 2023-11-21 NOTE — FLOWSHEET NOTE
activities  ([phone]) THERAPEUTIC ACTIVITY - use of dynamic activities to improve functional performance. (Ex include squatting, ascending/descending stairs, walking, bending, lifting, catching, throwing, pushing, pulling, jumping.)  Direct, one on one contact, billed in 15-minute increments. TREATMENT PLAN   Plan: Cont POC- Continue emphasis/focus on exercise progression, modulating pain, reduce/eliminate soft tissue swelling/inflammation/restriction, improving soft tissue extensibility, allowing for proper ROM, and static and dynamic balance. Next visit plan to progress weights, progress reps, add new exercises, progress balance, adjust HEP, and continued manual therapy to improve ROM and pain relief. Electronically Signed by Malik Wilson PT, DPT OMT-C     Date: 11/21/2023       Note: If patient does not return for scheduled/recommended follow up visits, this note will serve as a discharge from care along with the most recent update on progress.

## 2023-11-24 ENCOUNTER — HOSPITAL ENCOUNTER (OUTPATIENT)
Dept: PHYSICAL THERAPY | Age: 63
Setting detail: THERAPIES SERIES
Discharge: HOME | End: 2023-11-24
Payer: COMMERCIAL

## 2023-11-24 PROCEDURE — 97110 THERAPEUTIC EXERCISES: CPT

## 2023-11-24 NOTE — FLOWSHEET NOTE
scheduled/recommended follow up visits, this note will serve as a discharge from care along with the most recent update on progress.

## 2023-11-28 ENCOUNTER — HOSPITAL ENCOUNTER (OUTPATIENT)
Dept: PHYSICAL THERAPY | Age: 63
Setting detail: THERAPIES SERIES
Discharge: HOME | End: 2023-11-28
Payer: COMMERCIAL

## 2023-11-28 NOTE — PROGRESS NOTES
[facility] [employer]     Physical Therapy  Cancellation/No-show Note  Patient Name:  Juan Alex  :  1960   Date:  2023  Cancelled visits to date: 1  No-shows to date: 0    Patient status for today's appointment patient:  [x]  Cancelled   []  Rescheduled appointment  []  No-show     Reason given by patient:  []  Patient ill  []  Conflicting appointment  []  No transportation    []  Conflict with work  []  No reason given  [x]  Other:     Comments:  Pt called to cancel her appt as her mom isn't doing well    Phone call information:   []  Phone call made today to patient at _ time at number provided:      []  Patient answered, conversation as follows:    []  Patient did not answer, message left as follows:  []  Phone call not made today  [x]  Phone call not needed - pt contacted us to cancel and provided reason for cancellation.      Electronically signed by:  Malik Wilson PT

## 2023-12-01 ENCOUNTER — HOSPITAL ENCOUNTER (OUTPATIENT)
Dept: PHYSICAL THERAPY | Age: 63
Setting detail: THERAPIES SERIES
Discharge: HOME | End: 2023-12-01
Payer: COMMERCIAL

## 2023-12-01 PROCEDURE — 97112 NEUROMUSCULAR REEDUCATION: CPT

## 2023-12-01 PROCEDURE — 97110 THERAPEUTIC EXERCISES: CPT

## 2023-12-01 NOTE — FLOWSHEET NOTE
sense, posture, and/or proprioception for sitting and/or standing activities    TREATMENT PLAN   Plan: Cont POC- Continue emphasis/focus on exercise progression, improving soft tissue extensibility, and static and dynamic balance. Next visit plan to progress weights, progress reps, add new exercises, and progress balance     Electronically Signed by Saba Campos PT, DPT OMT-C     Date: 12/01/2023       Note: If patient does not return for scheduled/recommended follow up visits, this note will serve as a discharge from care along with the most recent update on progress.

## 2023-12-08 ENCOUNTER — HOSPITAL ENCOUNTER (OUTPATIENT)
Dept: PHYSICAL THERAPY | Age: 63
Setting detail: THERAPIES SERIES
Discharge: HOME | End: 2023-12-08
Payer: COMMERCIAL

## 2023-12-08 PROCEDURE — 97530 THERAPEUTIC ACTIVITIES: CPT

## 2023-12-08 PROCEDURE — 97110 THERAPEUTIC EXERCISES: CPT

## 2023-12-08 NOTE — FLOWSHEET NOTE
[address] [facility] and Therapy [facility], [address] [facility], [address] [place] [address] office: [phone] fax: [phone]      Physical Therapy: TREATMENT/PROGRESS NOTE   Patient: Julia Day (1 y.o. female)   Treatment Date: 2023   :  1960 MRN: [id number]   Visit #: 16   Insurance Allowable Auth Needed   60/yr  0 used as of 10/9/23 []Yes    [x]No    Insurance: Payor: UMR / Plan: UMR / Product Type: *No Product type* /   Insurance ID: [id number] - (Commercial)  Secondary Insurance (if applicable):    Treatment Diagnosis:     ICD-10-CM    1. Gait abnormality  R26.9       2. Pain  R52       3. Weakness  R53.1       4. Balance disorder  R26.89          Medical Diagnosis:    Unilateral primary osteoarthritis, right knee [M17.11]   Referring Physician: Nohemi Jeans, MD  PCP: Tremayne Hayden MD                             Plan of care signed (Y/N):   - Resent 23     Date of Patient follow up with Physician: JAIME TKA scheduled for 2024     Progress Report/POC: NO  POC update due: 2023 (OR 10 visits /OR [address], whichever is less)    Latex Allergy:  [x]NO      []YES    Preferred Language for Healthcare:   [x]English       []other:    SUBJECTIVE EXAMINATION     Patient Report/Comments:  Pt without complaint today.      OBJECTIVE EXAMINATION     Observation:   10/20: demo'd heel strike B/L with minimal knee flexion on R while ambulating with RW.  10/31: pt arrived to PT without AD, ambulating independently with mild limp    Test measurements:      Test used Initial score  10/9/23 2023   Pain Summary VAS 6 0/10   Functional questionnaire WOMAC 71 / 74% 10 / 10%   Other:   See below            SURGERY:     Scheduled Date: R TKA     10/16/23   Hospital D/C  recommendations Outpatient therapy     Comments:      Functional Testing Prehab  Date 10/9/2023 Post-op Re-Eval Date 10/17/2023 6 weeks from surgery  Date   Due: 23  Date Completed: 23 D/C
Discharged by IAN Major

## 2023-12-14 ENCOUNTER — HOSPITAL ENCOUNTER (OUTPATIENT)
Age: 63
Discharge: HOME | End: 2023-12-14
Payer: COMMERCIAL

## 2023-12-14 DIAGNOSIS — Z01.818 PREOP TESTING: ICD-10-CM

## 2023-12-14 DIAGNOSIS — I10 ESSENTIAL HYPERTENSION: ICD-10-CM

## 2023-12-14 DIAGNOSIS — R73.9 HYPERGLYCEMIA: ICD-10-CM

## 2023-12-14 LAB
ABO + RH BLD: NORMAL
ANION GAP SERPL CALCULATED.3IONS-SCNC: 6 MMOL/L (ref 3–16)
APTT BLD: 29.1 SEC (ref 22.7–35.9)
BACTERIA URNS QL MICRO: NORMAL /HPF
BASOPHILS # BLD: 0.1 K/UL (ref 0–0.2)
BASOPHILS NFR BLD: 0.8 %
BILIRUB UR QL STRIP.AUTO: NEGATIVE
BLD GP AB SCN SERPL QL: NORMAL
BUN SERPL-MCNC: 14 MG/DL (ref 7–20)
CALCIUM SERPL-MCNC: 9.7 MG/DL (ref 8.3–10.6)
CHLORIDE SERPL-SCNC: 104 MMOL/L (ref 99–110)
CLARITY UR: CLEAR
CO2 SERPL-SCNC: 31 MMOL/L (ref 21–32)
COLOR UR: YELLOW
CREAT SERPL-MCNC: 0.7 MG/DL (ref 0.6–1.2)
DEPRECATED RDW RBC AUTO: 14.3 % (ref 12.4–15.4)
EOSINOPHIL # BLD: 0.2 K/UL (ref 0–0.6)
EOSINOPHIL NFR BLD: 3.3 %
EPI CELLS #/AREA URNS AUTO: 1 /HPF (ref 0–5)
EST. AVERAGE GLUCOSE BLD GHB EST-MCNC: 105.4 MG/DL
GFR SERPLBLD CREATININE-BSD FMLA CKD-EPI: >60 ML/MIN/{1.73_M2}
GLUCOSE SERPL-MCNC: 97 MG/DL (ref 70–99)
GLUCOSE UR STRIP.AUTO-MCNC: NEGATIVE MG/DL
HBA1C MFR BLD: 5.3 %
HCT VFR BLD AUTO: 38.1 % (ref 36–48)
HGB BLD-MCNC: 12.7 G/DL (ref 12–16)
HGB UR QL STRIP.AUTO: NEGATIVE
HYALINE CASTS #/AREA URNS AUTO: 1 /LPF (ref 0–8)
INR PPP: 0.97 (ref 0.84–1.16)
KETONES UR STRIP.AUTO-MCNC: NEGATIVE MG/DL
LEUKOCYTE ESTERASE UR QL STRIP.AUTO: ABNORMAL
LYMPHOCYTES # BLD: 1.7 K/UL (ref 1–5.1)
LYMPHOCYTES NFR BLD: 26.9 %
MCH RBC QN AUTO: 26.3 PG (ref 26–34)
MCHC RBC AUTO-ENTMCNC: 33.2 G/DL (ref 31–36)
MCV RBC AUTO: 79.2 FL (ref 80–100)
MONOCYTES # BLD: 0.7 K/UL (ref 0–1.3)
MONOCYTES NFR BLD: 10.6 %
NEUTROPHILS # BLD: 3.6 K/UL (ref 1.7–7.7)
NEUTROPHILS NFR BLD: 58.4 %
NITRITE UR QL STRIP.AUTO: NEGATIVE
PH UR STRIP.AUTO: 7 [PH] (ref 5–8)
PLATELET # BLD AUTO: 358 K/UL (ref 135–450)
PMV BLD AUTO: 7.7 FL (ref 5–10.5)
POTASSIUM SERPL-SCNC: 3.8 MMOL/L (ref 3.5–5.1)
PROT UR STRIP.AUTO-MCNC: NEGATIVE MG/DL
PROTHROMBIN TIME: 12.9 SEC (ref 11.5–14.8)
RBC # BLD AUTO: 4.81 M/UL (ref 4–5.2)
RBC CLUMPS #/AREA URNS AUTO: 2 /HPF (ref 0–4)
SODIUM SERPL-SCNC: 141 MMOL/L (ref 136–145)
SP GR UR STRIP.AUTO: 1.01 (ref 1–1.03)
UA DIPSTICK W REFLEX MICRO PNL UR: YES
URN SPEC COLLECT METH UR: ABNORMAL
UROBILINOGEN UR STRIP-ACNC: 0.2 E.U./DL
WBC # BLD AUTO: 6.2 K/UL (ref 4–11)
WBC #/AREA URNS AUTO: 3 /HPF (ref 0–5)

## 2023-12-14 PROCEDURE — 81001 URINALYSIS AUTO W/SCOPE: CPT

## 2023-12-14 PROCEDURE — 87081 CULTURE SCREEN ONLY: CPT

## 2023-12-14 PROCEDURE — 83036 HEMOGLOBIN GLYCOSYLATED A1C: CPT

## 2023-12-14 PROCEDURE — 36415 COLL VENOUS BLD VENIPUNCTURE: CPT

## 2023-12-14 PROCEDURE — 86901 BLOOD TYPING SEROLOGIC RH(D): CPT

## 2023-12-14 PROCEDURE — 86900 BLOOD TYPING SEROLOGIC ABO: CPT

## 2023-12-14 PROCEDURE — 87086 URINE CULTURE/COLONY COUNT: CPT

## 2023-12-14 PROCEDURE — 86850 RBC ANTIBODY SCREEN: CPT

## 2023-12-14 PROCEDURE — 85610 PROTHROMBIN TIME: CPT

## 2023-12-14 PROCEDURE — 85025 COMPLETE CBC W/AUTO DIFF WBC: CPT

## 2023-12-14 PROCEDURE — 80048 BASIC METABOLIC PNL TOTAL CA: CPT

## 2023-12-14 PROCEDURE — 85730 THROMBOPLASTIN TIME PARTIAL: CPT

## 2023-12-15 ENCOUNTER — HOSPITAL ENCOUNTER (OUTPATIENT)
Dept: PHYSICAL THERAPY | Age: 63
Setting detail: THERAPIES SERIES
Discharge: HOME | End: 2023-12-15
Payer: COMMERCIAL

## 2023-12-15 LAB — BACTERIA UR CULT: NORMAL

## 2023-12-15 PROCEDURE — 97110 THERAPEUTIC EXERCISES: CPT

## 2023-12-15 NOTE — FLOWSHEET NOTE
Other:     Return to Play: NA    Prognosis for POC: [x] Good [] Fair  [] Poor    Patient requires continued skilled intervention: [x] Yes  [] No    GOALS     GOALS:  Patient stated goal: \"steps\" \"sleeping\" and added 10/17/23: \"do things without pain\"  [] Progressing: [] Met: [] Not Met: [] Adjusted     Therapist goals for Patient:   Short Term Goals: To be achieved in: 1-2 visit(s)  1. Independent in HEP and progression per patient tolerance, in order to prevent re-injury. [] Progressing: [x] Met: 10/17/23 [] Not Met: [] Adjusted  2. All patient questions regarding expectations for rehab following upcoming surgery are answered. [] Progressing: [x] Met:10/17/23  [] Not Met: [] Adjusted    Short Term Goals: To be achieved in: 2 weeks  Independent in HEP and progression per patient tolerance, in order to progress toward full function and prevent re-injury. Status: [] Progressing: [x] Met: [] Not Met: [] Adjusted  Patient will have a decrease in pain to 2/10 to help  facilitate improvement in movement, function, and ADLs as indicated by functional deficits. Status: [x] Progressing: [] Met: [] Not Met: [] Adjusted    Long Term Goals: To be achieved in: 10 weeks  Disability index score of 40% or less for the [facility] to assist with return top prior level of function. Status: [] Progressing: [x] Met: [] Not Met: [] Adjusted  LLE AROM = RLE AROM to allow for proper joint functioning as indicated by patients functional deficits.   Status: [] Progressing: [x] Met: [] Not Met: [] Adjusted  Pt to improve strength to 4+/5 or better of proximal hip, quadriceps, gastroc/soleus, and hamstringsto allow for proper muscle and joint use in functional mobility, ADLs and prior level of function  Status: [x] Progressing: [] Met: [] Not Met: [] Adjusted  Patient will return to squatting, light home activities, getting in/out of a car, walking on uneven ground, walk 1 mile, up/down 1 flight of stairs, stand for length of time, and

## 2023-12-16 LAB — MRSA SPEC QL CULT: NORMAL

## 2023-12-21 PROBLEM — M17.11 PRIMARY OSTEOARTHRITIS OF RIGHT KNEE: Status: RESOLVED | Noted: 2019-03-04 | Resolved: 2023-12-21

## 2023-12-27 ENCOUNTER — HOSPITAL ENCOUNTER (OUTPATIENT)
Dept: PHYSICAL THERAPY | Age: 63
Setting detail: THERAPIES SERIES
Discharge: HOME | End: 2023-12-27
Payer: COMMERCIAL

## 2023-12-27 PROCEDURE — 97530 THERAPEUTIC ACTIVITIES: CPT

## 2023-12-27 PROCEDURE — 97110 THERAPEUTIC EXERCISES: CPT

## 2023-12-27 NOTE — PLAN OF CARE
in 15-minute increments. TREATMENT PLAN   Plan: Discharge    Electronically Signed by Radha Houston PT, DPT [facility]    Date: 12/27/2023       Note: If patient does not return for scheduled/recommended follow up visits, this note will serve as a discharge from care along with the most recent update on progress.

## 2024-01-07 RX ORDER — TRANEXAMIC ACID 10 MG/ML
1000 INJECTION, SOLUTION INTRAVENOUS
Status: DISCONTINUED | OUTPATIENT
Start: 2024-01-08 | End: 2024-01-08 | Stop reason: HOSPADM

## 2024-01-07 RX ORDER — TRANEXAMIC ACID 10 MG/ML
1000 INJECTION, SOLUTION INTRAVENOUS ONCE
Status: COMPLETED | OUTPATIENT
Start: 2024-01-08 | End: 2024-01-08

## 2024-01-08 ENCOUNTER — HOSPITAL ENCOUNTER (OUTPATIENT)
Age: 64
Setting detail: OUTPATIENT SURGERY
Discharge: HOME | End: 2024-01-08
Attending: ORTHOPAEDIC SURGERY | Admitting: ORTHOPAEDIC SURGERY
Payer: COMMERCIAL

## 2024-01-08 ENCOUNTER — ANESTHESIA EVENT (OUTPATIENT)
Dept: OPERATING ROOM | Age: 64
End: 2024-01-08
Payer: COMMERCIAL

## 2024-01-08 ENCOUNTER — APPOINTMENT (OUTPATIENT)
Dept: GENERAL RADIOLOGY | Age: 64
End: 2024-01-08
Attending: ORTHOPAEDIC SURGERY
Payer: COMMERCIAL

## 2024-01-08 ENCOUNTER — ANESTHESIA (OUTPATIENT)
Dept: OPERATING ROOM | Age: 64
End: 2024-01-08
Payer: COMMERCIAL

## 2024-01-08 VITALS
BODY MASS INDEX: 24.84 KG/M2 | OXYGEN SATURATION: 93 % | HEART RATE: 83 BPM | RESPIRATION RATE: 12 BRPM | SYSTOLIC BLOOD PRESSURE: 114 MMHG | WEIGHT: 140.2 LBS | HEIGHT: 63 IN | DIASTOLIC BLOOD PRESSURE: 55 MMHG | TEMPERATURE: 97.7 F

## 2024-01-08 DIAGNOSIS — Z01.818 PREOP TESTING: Primary | ICD-10-CM

## 2024-01-08 DIAGNOSIS — M17.12 PRIMARY OSTEOARTHRITIS OF LEFT KNEE: ICD-10-CM

## 2024-01-08 LAB
ABO + RH BLD: NORMAL
BLD GP AB SCN SERPL QL: NORMAL
GLUCOSE BLD-MCNC: 113 MG/DL (ref 70–99)
HCT VFR BLD AUTO: 37.6 % (ref 36–48)
HGB BLD-MCNC: 12.4 G/DL (ref 12–16)
PERFORMED ON: ABNORMAL

## 2024-01-08 PROCEDURE — 6360000002 HC RX W HCPCS: Performed by: STUDENT IN AN ORGANIZED HEALTH CARE EDUCATION/TRAINING PROGRAM

## 2024-01-08 PROCEDURE — 85018 HEMOGLOBIN: CPT

## 2024-01-08 PROCEDURE — 86901 BLOOD TYPING SEROLOGIC RH(D): CPT

## 2024-01-08 PROCEDURE — 7100000001 HC PACU RECOVERY - ADDTL 15 MIN: Performed by: ORTHOPAEDIC SURGERY

## 2024-01-08 PROCEDURE — 6370000000 HC RX 637 (ALT 250 FOR IP): Performed by: NURSE ANESTHETIST, CERTIFIED REGISTERED

## 2024-01-08 PROCEDURE — 7100000011 HC PHASE II RECOVERY - ADDTL 15 MIN: Performed by: ORTHOPAEDIC SURGERY

## 2024-01-08 PROCEDURE — 6360000002 HC RX W HCPCS: Performed by: NURSE ANESTHETIST, CERTIFIED REGISTERED

## 2024-01-08 PROCEDURE — 2580000003 HC RX 258: Performed by: ORTHOPAEDIC SURGERY

## 2024-01-08 PROCEDURE — 85014 HEMATOCRIT: CPT

## 2024-01-08 PROCEDURE — 3600000004 HC SURGERY LEVEL 4 BASE: Performed by: ORTHOPAEDIC SURGERY

## 2024-01-08 PROCEDURE — 86850 RBC ANTIBODY SCREEN: CPT

## 2024-01-08 PROCEDURE — C1776 JOINT DEVICE (IMPLANTABLE): HCPCS | Performed by: ORTHOPAEDIC SURGERY

## 2024-01-08 PROCEDURE — 64447 NJX AA&/STRD FEMORAL NRV IMG: CPT | Performed by: STUDENT IN AN ORGANIZED HEALTH CARE EDUCATION/TRAINING PROGRAM

## 2024-01-08 PROCEDURE — 7100000010 HC PHASE II RECOVERY - FIRST 15 MIN: Performed by: ORTHOPAEDIC SURGERY

## 2024-01-08 PROCEDURE — 6360000002 HC RX W HCPCS

## 2024-01-08 PROCEDURE — 3700000001 HC ADD 15 MINUTES (ANESTHESIA): Performed by: ORTHOPAEDIC SURGERY

## 2024-01-08 PROCEDURE — 64999 UNLISTED PX NERVOUS SYSTEM: CPT | Performed by: STUDENT IN AN ORGANIZED HEALTH CARE EDUCATION/TRAINING PROGRAM

## 2024-01-08 PROCEDURE — 6370000000 HC RX 637 (ALT 250 FOR IP): Performed by: STUDENT IN AN ORGANIZED HEALTH CARE EDUCATION/TRAINING PROGRAM

## 2024-01-08 PROCEDURE — 6360000002 HC RX W HCPCS: Performed by: ORTHOPAEDIC SURGERY

## 2024-01-08 PROCEDURE — 2720000010 HC SURG SUPPLY STERILE: Performed by: ORTHOPAEDIC SURGERY

## 2024-01-08 PROCEDURE — 97165 OT EVAL LOW COMPLEX 30 MIN: CPT

## 2024-01-08 PROCEDURE — C1713 ANCHOR/SCREW BN/BN,TIS/BN: HCPCS | Performed by: ORTHOPAEDIC SURGERY

## 2024-01-08 PROCEDURE — 3700000000 HC ANESTHESIA ATTENDED CARE: Performed by: ORTHOPAEDIC SURGERY

## 2024-01-08 PROCEDURE — 6360000002 HC RX W HCPCS: Performed by: ANESTHESIOLOGY

## 2024-01-08 PROCEDURE — 6370000000 HC RX 637 (ALT 250 FOR IP)

## 2024-01-08 PROCEDURE — 3600000014 HC SURGERY LEVEL 4 ADDTL 15MIN: Performed by: ORTHOPAEDIC SURGERY

## 2024-01-08 PROCEDURE — 2500000003 HC RX 250 WO HCPCS: Performed by: ORTHOPAEDIC SURGERY

## 2024-01-08 PROCEDURE — 7100000000 HC PACU RECOVERY - FIRST 15 MIN: Performed by: ORTHOPAEDIC SURGERY

## 2024-01-08 PROCEDURE — 2709999900 HC NON-CHARGEABLE SUPPLY: Performed by: ORTHOPAEDIC SURGERY

## 2024-01-08 PROCEDURE — 2500000003 HC RX 250 WO HCPCS: Performed by: NURSE ANESTHETIST, CERTIFIED REGISTERED

## 2024-01-08 PROCEDURE — 73560 X-RAY EXAM OF KNEE 1 OR 2: CPT

## 2024-01-08 PROCEDURE — 86900 BLOOD TYPING SEROLOGIC ABO: CPT

## 2024-01-08 DEVICE — COMPONENT FEM SZ 4 L KNEE NAR POST STBL CEM ATTUNE: Type: IMPLANTABLE DEVICE | Site: KNEE | Status: FUNCTIONAL

## 2024-01-08 DEVICE — CEMENT BNE 40GM FULL DOSE PMMA W/ GENT HI VISC RADPQ LNG: Type: IMPLANTABLE DEVICE | Site: KNEE | Status: FUNCTIONAL

## 2024-01-08 DEVICE — COMPONENT PAT DIA32MM KNEE POLY CEM MEDIALIZED ANAT ATTUNE: Type: IMPLANTABLE DEVICE | Site: KNEE | Status: FUNCTIONAL

## 2024-01-08 DEVICE — INSERT TIB SZ 4 THK7MM POLY POST STBL ROT PLATFRM ATTUNE: Type: IMPLANTABLE DEVICE | Site: KNEE | Status: FUNCTIONAL

## 2024-01-08 DEVICE — BASEPLATE TIB SZ 3 CEM ROT PLATFRM KNEE SYS S + ATTUNE: Type: IMPLANTABLE DEVICE | Site: KNEE | Status: FUNCTIONAL

## 2024-01-08 RX ORDER — ONDANSETRON 2 MG/ML
INJECTION INTRAMUSCULAR; INTRAVENOUS PRN
Status: DISCONTINUED | OUTPATIENT
Start: 2024-01-08 | End: 2024-01-08 | Stop reason: SDUPTHER

## 2024-01-08 RX ORDER — APREPITANT 40 MG/1
40 CAPSULE ORAL ONCE
Status: COMPLETED | OUTPATIENT
Start: 2024-01-08 | End: 2024-01-08

## 2024-01-08 RX ORDER — SODIUM CHLORIDE 0.9 % (FLUSH) 0.9 %
5-40 SYRINGE (ML) INJECTION PRN
Status: DISCONTINUED | OUTPATIENT
Start: 2024-01-08 | End: 2024-01-08 | Stop reason: HOSPADM

## 2024-01-08 RX ORDER — ONDANSETRON 2 MG/ML
4 INJECTION INTRAMUSCULAR; INTRAVENOUS
Status: COMPLETED | OUTPATIENT
Start: 2024-01-08 | End: 2024-01-08

## 2024-01-08 RX ORDER — MELOXICAM 7.5 MG/1
7.5 TABLET ORAL DAILY
Status: CANCELLED | OUTPATIENT
Start: 2024-01-08 | End: 2024-01-11

## 2024-01-08 RX ORDER — SODIUM CHLORIDE 9 MG/ML
INJECTION, SOLUTION INTRAVENOUS PRN
Status: CANCELLED | OUTPATIENT
Start: 2024-01-08

## 2024-01-08 RX ORDER — PROPOFOL 10 MG/ML
INJECTION, EMULSION INTRAVENOUS PRN
Status: DISCONTINUED | OUTPATIENT
Start: 2024-01-08 | End: 2024-01-08 | Stop reason: SDUPTHER

## 2024-01-08 RX ORDER — PROCHLORPERAZINE EDISYLATE 5 MG/ML
5 INJECTION INTRAMUSCULAR; INTRAVENOUS
Status: COMPLETED | OUTPATIENT
Start: 2024-01-08 | End: 2024-01-08

## 2024-01-08 RX ORDER — SODIUM CHLORIDE 0.9 % (FLUSH) 0.9 %
5-40 SYRINGE (ML) INJECTION EVERY 12 HOURS SCHEDULED
Status: DISCONTINUED | OUTPATIENT
Start: 2024-01-08 | End: 2024-01-08 | Stop reason: HOSPADM

## 2024-01-08 RX ORDER — FENTANYL CITRATE 50 UG/ML
INJECTION, SOLUTION INTRAMUSCULAR; INTRAVENOUS
Status: DISCONTINUED
Start: 2024-01-08 | End: 2024-01-08 | Stop reason: HOSPADM

## 2024-01-08 RX ORDER — ROPIVACAINE HYDROCHLORIDE 5 MG/ML
INJECTION, SOLUTION EPIDURAL; INFILTRATION; PERINEURAL
Status: COMPLETED | OUTPATIENT
Start: 2024-01-08 | End: 2024-01-08

## 2024-01-08 RX ORDER — ROCURONIUM BROMIDE 10 MG/ML
INJECTION, SOLUTION INTRAVENOUS PRN
Status: DISCONTINUED | OUTPATIENT
Start: 2024-01-08 | End: 2024-01-08 | Stop reason: SDUPTHER

## 2024-01-08 RX ORDER — ONDANSETRON 4 MG/1
4 TABLET, ORALLY DISINTEGRATING ORAL EVERY 8 HOURS PRN
Status: DISCONTINUED | OUTPATIENT
Start: 2024-01-08 | End: 2024-01-08 | Stop reason: HOSPADM

## 2024-01-08 RX ORDER — MIDAZOLAM HYDROCHLORIDE 2 MG/2ML
2 INJECTION, SOLUTION INTRAMUSCULAR; INTRAVENOUS
Status: COMPLETED | OUTPATIENT
Start: 2024-01-08 | End: 2024-01-08

## 2024-01-08 RX ORDER — HYDROMORPHONE HYDROCHLORIDE 2 MG/ML
0.5 INJECTION, SOLUTION INTRAMUSCULAR; INTRAVENOUS; SUBCUTANEOUS
Status: CANCELLED | OUTPATIENT
Start: 2024-01-08

## 2024-01-08 RX ORDER — ACETAMINOPHEN 500 MG
500 TABLET ORAL 4 TIMES DAILY PRN
Qty: 120 TABLET | Refills: 5 | Status: SHIPPED | OUTPATIENT
Start: 2024-01-08

## 2024-01-08 RX ORDER — PHENYLEPHRINE HCL IN 0.9% NACL 1 MG/10 ML
SYRINGE (ML) INTRAVENOUS PRN
Status: DISCONTINUED | OUTPATIENT
Start: 2024-01-08 | End: 2024-01-08 | Stop reason: SDUPTHER

## 2024-01-08 RX ORDER — OXYCODONE HYDROCHLORIDE 5 MG/1
5 TABLET ORAL EVERY 4 HOURS PRN
Status: DISCONTINUED | OUTPATIENT
Start: 2024-01-08 | End: 2024-01-08 | Stop reason: HOSPADM

## 2024-01-08 RX ORDER — SODIUM CHLORIDE, SODIUM LACTATE, POTASSIUM CHLORIDE, CALCIUM CHLORIDE 600; 310; 30; 20 MG/100ML; MG/100ML; MG/100ML; MG/100ML
INJECTION, SOLUTION INTRAVENOUS CONTINUOUS
Status: CANCELLED | OUTPATIENT
Start: 2024-01-08

## 2024-01-08 RX ORDER — DEXAMETHASONE SODIUM PHOSPHATE 4 MG/ML
INJECTION, SOLUTION INTRA-ARTICULAR; INTRALESIONAL; INTRAMUSCULAR; INTRAVENOUS; SOFT TISSUE PRN
Status: DISCONTINUED | OUTPATIENT
Start: 2024-01-08 | End: 2024-01-08 | Stop reason: SDUPTHER

## 2024-01-08 RX ORDER — LIDOCAINE HYDROCHLORIDE 10 MG/ML
0.5 INJECTION, SOLUTION EPIDURAL; INFILTRATION; INTRACAUDAL; PERINEURAL ONCE
Status: DISCONTINUED | OUTPATIENT
Start: 2024-01-08 | End: 2024-01-08 | Stop reason: HOSPADM

## 2024-01-08 RX ORDER — SODIUM CHLORIDE, SODIUM LACTATE, POTASSIUM CHLORIDE, CALCIUM CHLORIDE 600; 310; 30; 20 MG/100ML; MG/100ML; MG/100ML; MG/100ML
INJECTION, SOLUTION INTRAVENOUS CONTINUOUS
Status: DISCONTINUED | OUTPATIENT
Start: 2024-01-08 | End: 2024-01-08 | Stop reason: HOSPADM

## 2024-01-08 RX ORDER — OXYCODONE HYDROCHLORIDE 5 MG/1
5 TABLET ORAL EVERY 4 HOURS PRN
Qty: 42 TABLET | Refills: 0 | Status: SHIPPED | OUTPATIENT
Start: 2024-01-08 | End: 2024-01-15

## 2024-01-08 RX ORDER — OXYCODONE HCL 10 MG/1
10 TABLET, FILM COATED, EXTENDED RELEASE ORAL ONCE
Status: DISCONTINUED | OUTPATIENT
Start: 2024-01-08 | End: 2024-01-08 | Stop reason: HOSPADM

## 2024-01-08 RX ORDER — OXYCODONE HYDROCHLORIDE 5 MG/1
5 TABLET ORAL
Status: COMPLETED | OUTPATIENT
Start: 2024-01-08 | End: 2024-01-08

## 2024-01-08 RX ORDER — HYDROMORPHONE HYDROCHLORIDE 2 MG/ML
0.5 INJECTION, SOLUTION INTRAMUSCULAR; INTRAVENOUS; SUBCUTANEOUS EVERY 5 MIN PRN
Status: DISCONTINUED | OUTPATIENT
Start: 2024-01-08 | End: 2024-01-08 | Stop reason: HOSPADM

## 2024-01-08 RX ORDER — ONDANSETRON 2 MG/ML
INJECTION INTRAMUSCULAR; INTRAVENOUS
Status: COMPLETED
Start: 2024-01-08 | End: 2024-01-08

## 2024-01-08 RX ORDER — SODIUM CHLORIDE 9 MG/ML
INJECTION, SOLUTION INTRAVENOUS PRN
Status: DISCONTINUED | OUTPATIENT
Start: 2024-01-08 | End: 2024-01-08 | Stop reason: HOSPADM

## 2024-01-08 RX ORDER — SODIUM CHLORIDE 0.9 % (FLUSH) 0.9 %
5-40 SYRINGE (ML) INJECTION PRN
Status: CANCELLED | OUTPATIENT
Start: 2024-01-08

## 2024-01-08 RX ORDER — FENTANYL CITRATE 50 UG/ML
INJECTION, SOLUTION INTRAMUSCULAR; INTRAVENOUS PRN
Status: DISCONTINUED | OUTPATIENT
Start: 2024-01-08 | End: 2024-01-08 | Stop reason: SDUPTHER

## 2024-01-08 RX ORDER — FENTANYL CITRATE 50 UG/ML
100 INJECTION, SOLUTION INTRAMUSCULAR; INTRAVENOUS
Status: COMPLETED | OUTPATIENT
Start: 2024-01-08 | End: 2024-01-08

## 2024-01-08 RX ORDER — SODIUM CHLORIDE 0.9 % (FLUSH) 0.9 %
5-40 SYRINGE (ML) INJECTION EVERY 12 HOURS SCHEDULED
Status: CANCELLED | OUTPATIENT
Start: 2024-01-08

## 2024-01-08 RX ORDER — OXYCODONE HYDROCHLORIDE 5 MG/1
10 TABLET ORAL EVERY 4 HOURS PRN
Status: DISCONTINUED | OUTPATIENT
Start: 2024-01-08 | End: 2024-01-08 | Stop reason: HOSPADM

## 2024-01-08 RX ORDER — ONDANSETRON 2 MG/ML
4 INJECTION INTRAMUSCULAR; INTRAVENOUS EVERY 6 HOURS PRN
Status: DISCONTINUED | OUTPATIENT
Start: 2024-01-08 | End: 2024-01-08 | Stop reason: HOSPADM

## 2024-01-08 RX ORDER — MELOXICAM 15 MG/1
15 TABLET ORAL DAILY
Qty: 30 TABLET | Refills: 3 | Status: SHIPPED | OUTPATIENT
Start: 2024-01-08

## 2024-01-08 RX ORDER — ACETAMINOPHEN 325 MG/1
650 TABLET ORAL ONCE
Status: COMPLETED | OUTPATIENT
Start: 2024-01-08 | End: 2024-01-08

## 2024-01-08 RX ORDER — ACETAMINOPHEN 325 MG/1
650 TABLET ORAL EVERY 6 HOURS
Status: CANCELLED | OUTPATIENT
Start: 2024-01-08

## 2024-01-08 RX ORDER — DIAZEPAM 5 MG/1
5 TABLET ORAL EVERY 6 HOURS PRN
Status: CANCELLED | OUTPATIENT
Start: 2024-01-08

## 2024-01-08 RX ORDER — ASPIRIN 81 MG/1
81 TABLET ORAL 2 TIMES DAILY
Qty: 60 TABLET | Refills: 0 | Status: SHIPPED | OUTPATIENT
Start: 2024-01-08

## 2024-01-08 RX ORDER — OXYCODONE HYDROCHLORIDE 5 MG/1
TABLET ORAL
Status: COMPLETED
Start: 2024-01-08 | End: 2024-01-08

## 2024-01-08 RX ORDER — LIDOCAINE HYDROCHLORIDE 40 MG/ML
SOLUTION TOPICAL PRN
Status: DISCONTINUED | OUTPATIENT
Start: 2024-01-08 | End: 2024-01-08 | Stop reason: SDUPTHER

## 2024-01-08 RX ORDER — DIAZEPAM 5 MG/1
5 TABLET ORAL EVERY 8 HOURS PRN
Qty: 20 TABLET | Refills: 0 | Status: SHIPPED | OUTPATIENT
Start: 2024-01-08 | End: 2024-01-18

## 2024-01-08 RX ORDER — LIDOCAINE HYDROCHLORIDE 20 MG/ML
INJECTION, SOLUTION INFILTRATION; PERINEURAL PRN
Status: DISCONTINUED | OUTPATIENT
Start: 2024-01-08 | End: 2024-01-08 | Stop reason: SDUPTHER

## 2024-01-08 RX ORDER — CEPHALEXIN 500 MG/1
500 CAPSULE ORAL 2 TIMES DAILY
Qty: 4 CAPSULE | Refills: 0 | Status: SHIPPED | OUTPATIENT
Start: 2024-01-08

## 2024-01-08 RX ORDER — HYDROMORPHONE HYDROCHLORIDE 2 MG/ML
0.25 INJECTION, SOLUTION INTRAMUSCULAR; INTRAVENOUS; SUBCUTANEOUS
Status: CANCELLED | OUTPATIENT
Start: 2024-01-08

## 2024-01-08 RX ORDER — ASPIRIN 325 MG
325 TABLET, DELAYED RELEASE (ENTERIC COATED) ORAL 2 TIMES DAILY
Status: CANCELLED | OUTPATIENT
Start: 2024-01-08

## 2024-01-08 RX ORDER — SODIUM CHLORIDE, SODIUM LACTATE, POTASSIUM CHLORIDE, CALCIUM CHLORIDE 600; 310; 30; 20 MG/100ML; MG/100ML; MG/100ML; MG/100ML
INJECTION, SOLUTION INTRAVENOUS CONTINUOUS
Status: DISCONTINUED | OUTPATIENT
Start: 2024-01-08 | End: 2024-01-08

## 2024-01-08 RX ORDER — FENTANYL CITRATE 50 UG/ML
50 INJECTION, SOLUTION INTRAMUSCULAR; INTRAVENOUS EVERY 5 MIN PRN
Status: COMPLETED | OUTPATIENT
Start: 2024-01-08 | End: 2024-01-08

## 2024-01-08 RX ADMIN — OXYCODONE HYDROCHLORIDE 5 MG: 5 TABLET ORAL at 14:14

## 2024-01-08 RX ADMIN — APREPITANT 40 MG: 40 CAPSULE ORAL at 10:15

## 2024-01-08 RX ADMIN — LIDOCAINE HYDROCHLORIDE 80 MG: 20 INJECTION, SOLUTION INFILTRATION; PERINEURAL at 11:22

## 2024-01-08 RX ADMIN — PROPOFOL 150 MG: 10 INJECTION, EMULSION INTRAVENOUS at 11:22

## 2024-01-08 RX ADMIN — ROPIVACAINE HYDROCHLORIDE 10 ML: 5 INJECTION, SOLUTION EPIDURAL; INFILTRATION; PERINEURAL at 10:35

## 2024-01-08 RX ADMIN — SUGAMMADEX 200 MG: 100 INJECTION, SOLUTION INTRAVENOUS at 12:48

## 2024-01-08 RX ADMIN — Medication 200 MCG: at 11:31

## 2024-01-08 RX ADMIN — MIDAZOLAM 1 MG: 1 INJECTION INTRAMUSCULAR; INTRAVENOUS at 10:30

## 2024-01-08 RX ADMIN — ONDANSETRON 4 MG: 2 INJECTION INTRAMUSCULAR; INTRAVENOUS at 13:52

## 2024-01-08 RX ADMIN — TRANEXAMIC ACID 1000 MG: 10 INJECTION, SOLUTION INTRAVENOUS at 11:28

## 2024-01-08 RX ADMIN — ROCURONIUM BROMIDE 50 MG: 10 INJECTION, SOLUTION INTRAVENOUS at 11:22

## 2024-01-08 RX ADMIN — LIDOCAINE HYDROCHLORIDE 4 ML: 40 SOLUTION TOPICAL at 11:25

## 2024-01-08 RX ADMIN — PROCHLORPERAZINE EDISYLATE 5 MG: 5 INJECTION INTRAMUSCULAR; INTRAVENOUS at 14:52

## 2024-01-08 RX ADMIN — FENTANYL CITRATE 50 MCG: 50 INJECTION, SOLUTION INTRAMUSCULAR; INTRAVENOUS at 11:39

## 2024-01-08 RX ADMIN — TRANEXAMIC ACID 1000 MG: 10 INJECTION, SOLUTION INTRAVENOUS at 12:19

## 2024-01-08 RX ADMIN — CEFAZOLIN 2000 MG: 2 INJECTION, POWDER, FOR SOLUTION INTRAMUSCULAR; INTRAVENOUS at 11:10

## 2024-01-08 RX ADMIN — ONDANSETRON 4 MG: 2 INJECTION INTRAMUSCULAR; INTRAVENOUS at 12:25

## 2024-01-08 RX ADMIN — FENTANYL CITRATE 50 MCG: 50 INJECTION, SOLUTION INTRAMUSCULAR; INTRAVENOUS at 11:20

## 2024-01-08 RX ADMIN — FENTANYL CITRATE 50 MCG: 50 INJECTION INTRAMUSCULAR; INTRAVENOUS at 10:30

## 2024-01-08 RX ADMIN — SODIUM CHLORIDE, POTASSIUM CHLORIDE, SODIUM LACTATE AND CALCIUM CHLORIDE: 600; 310; 30; 20 INJECTION, SOLUTION INTRAVENOUS at 10:11

## 2024-01-08 RX ADMIN — DEXAMETHASONE SODIUM PHOSPHATE 10 MG: 4 INJECTION, SOLUTION INTRAMUSCULAR; INTRAVENOUS at 11:22

## 2024-01-08 RX ADMIN — FENTANYL CITRATE 50 MCG: 50 INJECTION INTRAMUSCULAR; INTRAVENOUS at 13:18

## 2024-01-08 RX ADMIN — FENTANYL CITRATE 50 MCG: 50 INJECTION INTRAMUSCULAR; INTRAVENOUS at 13:25

## 2024-01-08 RX ADMIN — ACETAMINOPHEN 650 MG: 325 TABLET ORAL at 10:15

## 2024-01-08 RX ADMIN — ROPIVACAINE HYDROCHLORIDE 15 ML: 5 INJECTION, SOLUTION EPIDURAL; INFILTRATION; PERINEURAL at 10:33

## 2024-01-08 ASSESSMENT — PAIN - FUNCTIONAL ASSESSMENT
PAIN_FUNCTIONAL_ASSESSMENT: 0-10
PAIN_FUNCTIONAL_ASSESSMENT: 0-10

## 2024-01-08 ASSESSMENT — PAIN DESCRIPTION - PAIN TYPE
TYPE: SURGICAL PAIN
TYPE: ACUTE PAIN
TYPE: SURGICAL PAIN

## 2024-01-08 ASSESSMENT — PAIN SCALES - GENERAL
PAINLEVEL_OUTOF10: 8
PAINLEVEL_OUTOF10: 6
PAINLEVEL_OUTOF10: 5
PAINLEVEL_OUTOF10: 7

## 2024-01-08 ASSESSMENT — PAIN DESCRIPTION - LOCATION
LOCATION: KNEE

## 2024-01-08 ASSESSMENT — PAIN DESCRIPTION - ORIENTATION
ORIENTATION: LEFT

## 2024-01-08 ASSESSMENT — PAIN DESCRIPTION - DESCRIPTORS
DESCRIPTORS: ACHING

## 2024-01-08 ASSESSMENT — PAIN DESCRIPTION - FREQUENCY: FREQUENCY: CONTINUOUS

## 2024-01-08 ASSESSMENT — ENCOUNTER SYMPTOMS: SHORTNESS OF BREATH: 0

## 2024-01-08 NOTE — ANESTHESIA PROCEDURE NOTES
Peripheral Block    Patient location during procedure: pre-op  Reason for block: post-op pain management and at surgeon's request  Start time: 1/8/2024 10:35 AM  End time: 1/8/2024 10:37 AM  Staffing  Performed: anesthesiologist   Anesthesiologist: Yeny Chase MD  Performed by: Yeny Chase MD  Authorized by: Yeny Chase MD    Preanesthetic Checklist  Completed: patient identified, IV checked, site marked, risks and benefits discussed, surgical/procedural consents, equipment checked, pre-op evaluation, timeout performed, anesthesia consent given, oxygen available, monitors applied/VS acknowledged, fire risk safety assessment completed and verbalized and blood product R/B/A discussed and consented  Peripheral Block   Patient position: supine  Prep: ChloraPrep  Provider prep: sterile gloves and mask  Patient monitoring: continuous pulse ox, IV access, frequent blood pressure checks, oxygen, responsive to questions and cardiac monitor  Block type: Femoral  Adductor canal  Laterality: left  Injection technique: single-shot  Guidance: ultrasound guided    Needle   Needle type: insulated echogenic nerve stimulator needle   Needle gauge: 20 G  Needle localization: ultrasound guidance  Needle length: 10 cm  Assessment   Injection assessment: negative aspiration for heme, no paresthesia on injection, local visualized surrounding nerve on ultrasound and no intravascular symptoms  Paresthesia pain: none  Slow fractionated injection: yes  Hemodynamics: stable  Real-time US image taken/store: yes  Outcomes: uncomplicated and patient tolerated procedure well    Medications Administered  ropivacaine (NAROPIN) injection 0.5% - Perineural   10 mL - 1/8/2024 10:35:00 AM

## 2024-01-08 NOTE — ANESTHESIA PRE PROCEDURE
Department of Anesthesiology  Preprocedure Note       Name:  Smita Drew   Age:  63 y.o.  :  1960                                          MRN:  [id number]         Date:  2024      Surgeon: Surgeon(s):  Elver Austin MD    Procedure: LEFT TOTAL KNEE ARTHROPLASTY - DEPUY (Left: Knee)    Medications prior to admission:   Prior to Admission medications    Medication Sig Start Date End Date Taking? Authorizing Provider   triamterene-hydroCHLOROthiazide (DYAZIDE) 37.5-25 MG per capsule Take 1 capsule by mouth daily 11/15/23   Alejandro Abebe MD   omeprazole (PRILOSEC) 20 MG delayed release capsule Take 2 capsules by mouth daily    Elmer Ring MD   aspirin (ASPIRIN 81) 81 MG EC tablet Take 1 tablet by mouth in the morning and at bedtime  Patient taking differently: Take 325 mg by mouth in the morning and at bedtime 10/16/23   Elver Austin MD   potassium chloride (KLOR-CON M) 20 MEQ TBCR extended release tablet Take 1 tablet by mouth daily 23   Alejandro Abebe MD   atorvastatin (LIPITOR) 20 MG tablet TAKE 1 TABLET BY MOUTH EVERY DAY 23   Alejandro Abebe MD   olopatadine (PATADAY) 0.2 % SOLN ophthalmic solution Place 1 drop into both eyes daily    Elmer Ring MD   loratadine (CLARITIN) 10 MG tablet Take 1 tablet by mouth daily    Elmer Ring MD   Multiple Vitamins-Minerals (CENTRUM CARDIO) TABS Take  by mouth daily.    Elmer Ring MD   psyllium 0.52 g capsule Take by mouth 2 times daily     Elmer Ring MD   calcium carbonate (OSCAL) 500 MG TABS tablet Take 1,200 mg by mouth daily     Elmer Ring MD       Current medications:    Current Facility-Administered Medications   Medication Dose Route Frequency Provider Last Rate Last Admin    lactated ringers IV soln infusion   IntraVENous Continuous Elver Austin MD        lidocaine PF 1 % injection 0.5 mL  0.5 mL IntraDERmal Once Elver Austin MD

## 2024-01-08 NOTE — ANESTHESIA PROCEDURE NOTES
Peripheral Block    Patient location during procedure: pre-op  Reason for block: post-op pain management and at surgeon's request  Start time: 1/8/2024 10:33 AM  End time: 1/8/2024 10:34 AM  Staffing  Performed: anesthesiologist   Anesthesiologist: Yeny Chase MD  Performed by: Yeny Chase MD  Authorized by: Yeny Chase MD    Preanesthetic Checklist  Completed: patient identified, IV checked, site marked, risks and benefits discussed, surgical/procedural consents, equipment checked, pre-op evaluation, timeout performed, anesthesia consent given, oxygen available, monitors applied/VS acknowledged, fire risk safety assessment completed and verbalized and blood product R/B/A discussed and consented  Peripheral Block   Patient position: right lateral decubitus  Prep: ChloraPrep  Provider prep: sterile gloves and mask  Patient monitoring: continuous pulse ox, IV access, frequent blood pressure checks, oxygen, responsive to questions and cardiac monitor  Block type: iPacks  Laterality: left  Injection technique: single-shot  Guidance: ultrasound guided    Needle   Needle type: insulated echogenic nerve stimulator needle   Needle gauge: 20 G  Needle localization: ultrasound guidance  Needle length: 10 cm  Assessment   Injection assessment: negative aspiration for heme, no paresthesia on injection, local visualized surrounding nerve on ultrasound and no intravascular symptoms  Paresthesia pain: none  Slow fractionated injection: yes  Hemodynamics: stable  Real-time US image taken/store: yes  Outcomes: uncomplicated and patient tolerated procedure well    Medications Administered  ropivacaine (NAROPIN) injection 0.5% - Perineural   15 mL - 1/8/2024 10:33:00 AM

## 2024-01-08 NOTE — ANESTHESIA POSTPROCEDURE EVALUATION
Department of Anesthesiology  Postprocedure Note    Patient: Smita Drew  MRN: [id number]  YOB: 1960  Date of evaluation: 1/8/2024    Procedure Summary       Date: 01/08/24 Room / Location: [address]    Anesthesia Start: 1117 Anesthesia Stop: 1307    Procedure: LEFT TOTAL KNEE ARTHROPLASTY - DEPUY (Left: Knee) Diagnosis:       Osteoarthritis of left knee, unspecified osteoarthritis type      (Osteoarthritis of left knee, unspecified osteoarthritis type [M17.12])    Surgeons: Elver Austin MD Responsible Provider: Yeny Chase MD    Anesthesia Type: general, regional ASA Status: 3            Anesthesia Type: No value filed.    Conchis Phase I: Conchis Score: 10    Conchis Phase II:      Anesthesia Post Evaluation    Patient location during evaluation: bedside  Patient participation: complete - patient participated  Level of consciousness: awake and alert  Pain score: 2  Airway patency: patent  Nausea & Vomiting: no vomiting  Cardiovascular status: hemodynamically stable  Respiratory status: nonlabored ventilation  Hydration status: stable  Multimodal analgesia pain management approach  Pain management: adequate    No notable events documented.

## 2024-01-08 NOTE — PROGRESS NOTES
[facility] - Inpatient Rehabilitation Department   Phone: [phone]    Occupational Therapy    [x] Initial Evaluation            [] Daily Treatment Note         [x] Discharge Summary      Patient: Smita Drew   : 1960   MRN: [id number]   Date of Service:  2024    Admitting Diagnosis:  L TKA  Current Admission Summary: s/p L TKA 24  Past Medical History:  has a past medical history of Allergic, Anemia, Arthritis, Asthma, Cancer (HCC), Colon cancer (HCC), Deaf, left, GERD (gastroesophageal reflux disease), Hearing loss, Hyperlipidemia, Hypertension, IBS (irritable bowel syndrome), Migraine, and PONV (postoperative nausea and vomiting).  Past Surgical History:  has a past surgical history that includes Cholecystectomy; Hysterectomy;  section; Septoplasty; other surgical history; Middle ear surgery; right colectomy (3/16/16); knee surgery; Knee arthroscopy (Right, 3/15/2019); Mohs surgery (2020); Ovary removal; and Total knee arthroplasty (Right, 10/16/2023).    Discharge Recommendations: Smita Drew scored a  on the [facility] ADL Inpatient form.  At this time, no further OT is recommended upon discharge due to patient at independent level.  Recommend patient returns to prior setting with prior services.      DME Required For Discharge: No DME required    Precautions/Restrictions: weight bearing, up as tolerated (WBAT LLE)  Positional Restrictions:no positional restrictions    Pre-Admission Information   Lives With: spouse                  Type of Home: house  Home Layout: tri-level, 5 steps up from garage entry to main living area with B handrail, 10 steps up from main level to bedroom with B handrail   Home Access: level entry  Bathroom Layout: tub/shower unit, walk in shower  Bathroom Equipment: grab bars in shower  Toilet Height: standard height  Home Equipment: rollator - 4 wheeled walker, single point cane, manual wheelchair, reacher  Transfer Assistance: 
Discharge instructions review with patient and pt  bedside. All home medications have been reviewed, pt v/u.  Pt discharged via wheelchair. Pt discharged with instructions, prescriptions picked up by  and all belongings. Pt  taking stable pt home.     
Name_______________________________________Printed:____________________  Date and time of surgery__1/8 1300______________________Arrival Time:___1100_____________   1. The instructions given regarding when and if a patient needs to stop oral intake prior to surgery varies.Follow the specific instructions you were given                  __x_Nothing to eat or to drink after Midnight the night before.                   ____Carbo loading or instructions will be given to select patients-if you have been given those instructions -please do the following                           The evening before your surgery after dinner before midnight drink 40 ounces of gatorade.If you are diabetic use sugar free.  The morning of surgery drink 40 ounces of water.This needs to be finished 3 hours prior to your surgery start time.    2. Take the following pills with a small sip of water on the morning of surgery__prilosec_________________________________________________                  Do not take blood pressure medications ending in pril or sartan the alem prior to surgery or the morning of surgery. Dr March's patient are not to take any medications the AM of surgery.         3. Aspirin, Ibuprofen, Advil, Naproxen, Vitamin E and other Anti-inflammatory products and supplements should be stopped for 5 -7days before surgery or as directed by your physician.   4. Check with your Doctor regarding stopping Plavix, Coumadin,Eliquis, Lovenox,Effient,Pradaxa,Xarelto, Fragmin or other blood thinners and follow their instructions.   5. Do not smoke, and do not drink any alcoholic beverages 24 hours prior to surgery.  This includes NA Beer.Refrain from the usage of any recreational drugs.   6. You may brush your teeth and gargle the morning of surgery.  DO NOT SWALLOW WATER   7. You MUST make arrangements for a responsible adult to stay on site while you are here and take you home after your surgery. You will not be allowed to leave alone or 
Nerve block procedure completed.  Patient tolerated well.   /78.  P75.  SaO2 100% on O2 at 2L/NC.   returned to bedside.  
PT/OT bedside to work with pt at this time.   
Patient notified of time change to 1130 with 0930 arrival time. Verbalized understanding.  
Pt arrived from OR to PACU bay 2. Reported received from OR staff. Pt minimally arousable to voice. Surgical incisions dressings in place to left knee. Pt arrives with nasal cannula oxygen in place with 2L infusing, vitals as charted.     
Pt awake and alert at this time. Pt on RA, and VSS. Pt reports improved pain and denies nausea, tolerating PO.  Skin warm, palpable pulses and able to wiggle toes. Pt meets criteria to be discharged from Phase 1.     
Pt resting in bed;  bedside with pt. Both updated on plan of care, v/u.   
Teaching / education initiated regarding perioperative experience, expectations, and pain management during stay. Patient verbalized understanding.   
Timeout completed per protocol prior to anesthesia.  Monitors applied.  O2 on at 2L/NC, pulse ox in place.  Fentanyl and Versed IV given for mild sedation per anesthesia verbal orders.    
DPT 031600

## 2024-01-08 NOTE — H&P
Date of Surgery Update:  Smita Drew was seen, history and physical examination reviewed, and patient examined by me today. There have been no significant clinical changes since the completion of the previous history and physical.    The risk, benefits, and alternatives of the proposed procedure have been explained to the patient (or appropriate guardian) and understanding verbalized. All questions answered. Patient wishes to proceed.    Electronically signed by: Elver Austin MD,1/8/2024,11:13 AM

## 2024-01-08 NOTE — DISCHARGE INSTRUCTIONS
TOTAL JOINT REPLACEMENT INSTRUCTIONS    Please call your surgeon's office to schedule your 2 week follow up appointment after discharge.     Call Dr. Austin for any questions and concerns, or to make a follow-up appointment    How can you care for yourself at home?  Anesthesia    If you had anesthesia today, you are under anesthesia precautions.    Start with a light meal and advanced as tolerated.  Sometimes people get nauseous on the day of surgery, so please avoid heavy, spicy, or greasy foods. If vomiting becomes            persistent, please call your surgeon's office.    Do not drive, do not sign legal documents or make important decisions.  It is normal to experience a sore throat, dry mouth, muscle aches, dizziness, and sleepiness.  For sore throats, we recommend salt water gargles or lozenges.  This should improve with time.   A responsible adult should be with you for the next 24-48 hours.   Wear your seatbelt home.    Expect 2-4 weeks of driving restrictions.     Activity    Rest when you feel tired. You may take a nap, but don't stay in bed all day. When you sit, use a chair with arms. You can use the arms to help you stand up.     Do not sit for more than 1 hour at a time. Get up and walk around for 5-10 minutes before you sit again. If you must sit for a long time, prop up your leg with a chair or footstool. This will help you avoid swelling.     Ask your doctor when you can drive again. It may take up to 2-8 weeks after surgery before it's safe for you to drive, depending on which extremity was operated on.     When you get into a car, sit on the edge of the seat. Then pull in your legs, and turn to face the front.     Ask your doctor when it is okay for you to have sex.  You will probably need to take 4 to 16 weeks off from work. When you can go back to work depends on the type of work you do and how you feel. Ask your surgeon's office for  FMLA paperwork or a return to work note.   Diet    By

## 2024-01-09 NOTE — OP NOTE
cutting block was pinned on the distal  end of the femur at 3 degrees of external rotation.  We completed our  anterior, posterior, and chamfer cuts on the femur at that time.  Our  box cutting guide was placed onto the femur and our box cut was also  completed.  Residuals of the medial and lateral meniscus were removed at  that time as well as osteophytes off the posterior femoral condyles.   PCL retractor once again was placed and tibia subluxed anteriorly.  A  size 3 tibial baseplate had a good fit on the proximal tibia without  overhang; therefore, the tibia was prepared with a reamer followed by a  keel punch.  We left our trial tibia in place and placed our trialed  size 4 left posterior stabilized femoral component on to the femur and  trialed with multiple size polyethylenes.  With the size 4 x 7 mm  polyethylene insert, we had full extension and flexion of 130 degrees  with good balancing of the medial and lateral soft tissues.  The patella  at that time was everted.  A measured resection was done on the back  surface of the patella to cut it to a thickness of 14 mm.  We then  reamed lug holes for a size 32 anatomic medialized patella and trialed  with that patella in place, which demonstrated excellent patellar  tracking.  The trial components were removed from the knee at that time.  The knee was washed with pulse lavage.  Knee was also washed with an  IrriSept antibacterial solution and soft tissues were injected with an  Orthomix local anesthetic solution.  Cement was mixed on the back table  at that time.    Once the cement was ready, we cemented in our final components starting  with the size 3 rotating platform tibial baseplate, followed by a size 4  narrow left posterior stabilized femoral component followed by a size 32  anatomic medialized patella.  A size 4 x 7 mm polyethylene insert was  held in the joint space with the knee in full extension with an axial  load while we waited for the cement to

## 2024-01-10 ENCOUNTER — HOSPITAL ENCOUNTER (OUTPATIENT)
Dept: PHYSICAL THERAPY | Age: 64
Setting detail: THERAPIES SERIES
Discharge: HOME | End: 2024-01-10
Payer: COMMERCIAL

## 2024-01-10 DIAGNOSIS — M25.662 IMPAIRED RANGE OF MOTION OF LEFT KNEE: ICD-10-CM

## 2024-01-10 DIAGNOSIS — Z74.09 IMPAIRED FUNCTIONAL MOBILITY, BALANCE, GAIT, AND ENDURANCE: Primary | ICD-10-CM

## 2024-01-10 DIAGNOSIS — R29.898 WEAKNESS OF LEFT LOWER EXTREMITY: ICD-10-CM

## 2024-01-10 PROCEDURE — 97161 PT EVAL LOW COMPLEX 20 MIN: CPT

## 2024-01-10 PROCEDURE — 97110 THERAPEUTIC EXERCISES: CPT

## 2024-01-10 NOTE — FLOWSHEET NOTE
Notes/Cues/Progressions   Bike  5 min  Retro to fwd revs                                                                  Manual Intervention (52510)  TIME                                        NMR re-education (72612) resistance Sets/time Reps CUES NEEDED                                      Therapeutic Activity (57666)  Sets/time                                          Modalities:    No modalities applied this session    Education/Home Exercise Program: Not enough time for HEP instruction this visit.  Plan to address at follow up.    1/10: Pt education: Discussed with pt her current symptoms (sore/stiffness) are normal for being 2 days post op. Educated on effects of ice and encouraged her to increase use of ice machine and take pain medications as directed to stay on top of pain. Discussed projected timeline for PT and goals. X 10 min    ASSESSMENT     Today's Assessment: See Eval    Medical Necessity Documentation:  I certify that this patient meets the below criteria necessary for medical necessity for care and/or justification of therapy services:  The patient has functional impairments and/or activity limitations and would benefit from continued outpatient therapy services to address the deficits outlined in the patients goals    Treatment/Activity Tolerance:  [x] Patient tolerated treatment well [] Patient limited by fatique  [] Patient limited by pain  [] Patient limited by other medical complications  [] Other:     Return to Play: NA    Prognosis for POC: [x] Good [] Fair  [] Poor    Patient requires continued skilled intervention: [x] Yes  [] No      GOALS     Patient stated goal: walking  and moving without pain   Status: [] Progressing: [] Met: [] Not Met: [] Adjusted     Therapist goals for Patient:   Short Term Goals: To be achieved in: 2 weeks  Independent in HEP and progression per patient tolerance, in order to progress toward full function and prevent re-injury.               Status: []

## 2024-01-10 NOTE — PLAN OF CARE
[facility] - Outpatient Rehabilitation and Therapy [address], [place] [address] office: [phone] fax: [phone]     Physical Therapy Certification      Dear Elver Austin MD,    We had the pleasure of evaluating the following patient for physical therapy services at [facility].  A summary of our findings can be found in the initial assessment below.  This includes our plan of care.  If you have any questions or concerns regarding these findings, please do not hesitate to contact me at the office phone number listed above.  Thank you for the referral.     Physician Signature:_______________________________Date:__________________  By signing above (or electronic signature), therapist’s plan is approved by physician       Initial Evaluation   Patient: Smtia Drew (63 y.o. female)   Examination Date: 01/10/2024   :  1960 MRN: [id number]   Visit #: 1    Insurance: Payor: UMR / Plan: UMR / Product Type: *No Product type* /   Insurance ID: [id number] - (Commercial)  Secondary Insurance (if applicable):    Treatment Diagnosis:     ICD-10-CM    1. Impaired functional mobility, balance, gait, and endurance  Z74.09       2. Impaired range of motion of left knee  M25.662       3. Weakness of left lower extremity  R29.898          Medical Diagnosis:  Unilateral primary osteoarthritis, left knee [M17.12]   Referring Physician: Elver Austin MD  PCP: Alejandro Abebe MD                              Precautions/ Contra-indications:           Latex allergy:  NO  Pacemaker:    NO  Contraindications for Manipulation: None and recent surgical history (relative)  Date of Surgery: 24  Other:     Red Flags:  None    C-SSRS Triggered by Intake questionnaire:   [x] No, Questionnaire did not trigger screening.   [] Yes, Patient intake triggered further evaluation      [] C-SSRS Screening completed  [] PCP notified via Plan of Care  [] Emergency

## 2024-01-12 ENCOUNTER — HOSPITAL ENCOUNTER (OUTPATIENT)
Dept: PHYSICAL THERAPY | Age: 64
Setting detail: THERAPIES SERIES
Discharge: HOME | End: 2024-01-12
Payer: COMMERCIAL

## 2024-01-12 PROCEDURE — 97530 THERAPEUTIC ACTIVITIES: CPT

## 2024-01-12 PROCEDURE — 97110 THERAPEUTIC EXERCISES: CPT

## 2024-01-12 NOTE — FLOWSHEET NOTE
[facility] - Outpatient Rehabilitation and Therapy [address], [place] [address] office: [phone] fax: [phone]      Physical Therapy: TREATMENT/PROGRESS NOTE   Patient: Smita Drew (63 y.o. female)   Treatment Date: 2024   :  1960 MRN: [id number]   Visit #:   Insurance Allowable Auth Needed    []Yes    [x]No    Insurance: Payor: UMR / Plan: UMR / Product Type: *No Product type* /   Insurance ID: [id number] - (Commercial)  Secondary Insurance (if applicable):     60 per year hard max combined between disciplines   Covered at 80/20 once deductible met   All codes billable  No auth needed   Treatment Diagnosis:     ICD-10-CM    1. Impaired functional mobility, balance, gait, and endurance  Z74.09       2. Impaired range of motion of left knee  M25.662       3. Weakness of left lower extremity  R29.898          Medical Diagnosis:    Unilateral primary osteoarthritis, left knee [M17.12]    Referring Physician: Elver Austin MD  PCP: Alejandro Abebe MD                             Plan of care signed (Y/N):     Date of Patient follow up with Physician:      Progress Report/POC: NO  POC update due: (10 visits /OR AUTH LIMITS, whichever is less)  2024     Precautions/ Contra-indications:                                                                                          Latex allergy:  NO  Pacemaker:    NO  Contraindications for Manipulation: None and recent surgical history (relative)  Date of Surgery: 24  Other:     Preferred Language for Healthcare:   [x]English       []other:    SUBJECTIVE EXAMINATION     Patient Report/Comments: Patient reports things are starting to come back a little more, like the pain across the top of the knee.      Test used Initial score  1/10/24 2024   Pain Summary VAS 7    Functional questionnaire WOMAC 56/96  58% dysf    Other:                OBJECTIVE EXAMINATION     Observation:     Test measurements: :

## 2024-01-14 DIAGNOSIS — E78.00 HYPERCHOLESTEREMIA: ICD-10-CM

## 2024-01-15 RX ORDER — ATORVASTATIN CALCIUM 20 MG/1
TABLET, FILM COATED ORAL
Qty: 90 TABLET | Refills: 0 | Status: SHIPPED | OUTPATIENT
Start: 2024-01-15

## 2024-01-15 RX ORDER — POTASSIUM CHLORIDE 1500 MG/1
20 TABLET, EXTENDED RELEASE ORAL DAILY
Qty: 90 TABLET | Refills: 1 | Status: SHIPPED | OUTPATIENT
Start: 2024-01-15

## 2024-01-16 ENCOUNTER — APPOINTMENT (OUTPATIENT)
Dept: PHYSICAL THERAPY | Age: 64
End: 2024-01-16
Payer: COMMERCIAL

## 2024-01-16 PROCEDURE — 97112 NEUROMUSCULAR REEDUCATION: CPT

## 2024-01-16 PROCEDURE — 97110 THERAPEUTIC EXERCISES: CPT

## 2024-01-16 PROCEDURE — 97530 THERAPEUTIC ACTIVITIES: CPT

## 2024-01-16 NOTE — FLOWSHEET NOTE
[facility] - Outpatient Rehabilitation and Therapy [address], [place] [address] office: [phone] fax: [phone]      Physical Therapy: TREATMENT/PROGRESS NOTE   Patient: Smita Drew (63 y.o. female)   Treatment Date: 2024   :  1960 MRN: [id number]   Visit #: 3 /12  Insurance Allowable Auth Needed    []Yes    [x]No    Insurance: Payor: UMR / Plan: UMR / Product Type: *No Product type* /   Insurance ID: [id number] - (Commercial)  Secondary Insurance (if applicable):     60 per year hard max combined between disciplines   Covered at 80/20 once deductible met   All codes billable  No auth needed   Treatment Diagnosis:     ICD-10-CM    1. Impaired functional mobility, balance, gait, and endurance  Z74.09       2. Impaired range of motion of left knee  M25.662       3. Weakness of left lower extremity  R29.898          Medical Diagnosis:    Unilateral primary osteoarthritis, left knee [M17.12]    Referring Physician: Elver Austin MD  PCP: Alejandro Abebe MD                             Plan of care signed (Y/N):     Date of Patient follow up with Physician: 24     Progress Report/POC: NO  POC update due: (10 visits /OR AUTH LIMITS, whichever is less)  2024     Precautions/ Contra-indications:                                                                                          Latex allergy:  NO  Pacemaker:    NO  Contraindications for Manipulation: None and recent surgical history (relative)  Date of Surgery: 24  Other:     Preferred Language for Healthcare:   [x]English       []other:    SUBJECTIVE EXAMINATION     Patient Report/Comments: Patient reports she has noticed when she stretches with her SOS she feels like the incision wants to tear open.      Test used Initial score  1/10/24 2024   Pain Summary VAS 7 2-3   Functional questionnaire WOMAC 56/96  58% dysf    Other:                OBJECTIVE EXAMINATION     Observation:     Test

## 2024-01-19 ENCOUNTER — APPOINTMENT (OUTPATIENT)
Dept: PHYSICAL THERAPY | Age: 64
End: 2024-01-19
Payer: COMMERCIAL

## 2024-01-19 PROCEDURE — 97112 NEUROMUSCULAR REEDUCATION: CPT

## 2024-01-19 PROCEDURE — 97110 THERAPEUTIC EXERCISES: CPT

## 2024-01-19 PROCEDURE — 97530 THERAPEUTIC ACTIVITIES: CPT

## 2024-01-19 NOTE — FLOWSHEET NOTE
current HEP - suggested pt add seated LAQ and advised to be careful to not overstretch the knee (this happened with pt's R TKA)    1/16: Discussed with pt decreasing intensity of stretches. Educated pt that her ROM is good for where she is at, so with pt decreasing intensity of stretches this should not set her back    1/19: Discussed decreasing number of laps done of walking at the mall and staying at that number for ~1 week before increasing number of laps. Reassured patient that she cannot hurt the prosthesis but she will likely experience more pain/swelling/stiffness if she increases the number of laps too quickly.     ASSESSMENT     Today's Assessment: Patient had excellent tolerance to today's session, reporting reduced soreness and challenge with program completed. Able to progress  exercise difficulty on balance by adding in shuttle balance activities for static balance on an uneven surface. Continues to display deficits in weakness, decreased dynamic stabilty, and decreased balance control which required ongoing skilled physical therapy and decision making. Pt to have 2 week follow up with MD next week.     Medical Necessity Documentation:  I certify that this patient meets the below criteria necessary for medical necessity for care and/or justification of therapy services:  The patient has functional impairments and/or activity limitations and would benefit from continued outpatient therapy services to address the deficits outlined in the patients goals    Treatment/Activity Tolerance:  [x] Patient tolerated treatment well [] Patient limited by fatique  [] Patient limited by pain  [] Patient limited by other medical complications  [] Other:     Return to Play: NA    Prognosis for POC: [x] Good [] Fair  [] Poor    Patient requires continued skilled intervention: [x] Yes  [] No      GOALS     Patient stated goal: walking  and moving without pain   Status: [] Progressing: [] Met: [] Not Met: [] Adjusted

## 2024-01-23 ENCOUNTER — APPOINTMENT (OUTPATIENT)
Dept: PHYSICAL THERAPY | Age: 64
End: 2024-01-23
Payer: COMMERCIAL

## 2024-01-24 PROCEDURE — 97530 THERAPEUTIC ACTIVITIES: CPT

## 2024-01-24 PROCEDURE — 97112 NEUROMUSCULAR REEDUCATION: CPT

## 2024-01-24 PROCEDURE — 97110 THERAPEUTIC EXERCISES: CPT

## 2024-01-24 NOTE — FLOWSHEET NOTE
6     [facility] - Outpatient Rehabilitation and Therapy [address], [place] [address] office: [phone] fax: [phone]      Physical Therapy: TREATMENT/PROGRESS NOTE   Patient: Smita Drew (63 y.o. female)   Treatment Date: 2024   :  1960 MRN: [id number]   Visit #:   Insurance Allowable Auth Needed    []Yes    [x]No    Insurance: Payor: UMR / Plan: UMR / Product Type: *No Product type* /   Insurance ID: [id number] - (Commercial)  Secondary Insurance (if applicable):     60 per year hard max combined between disciplines   Covered at 80/20 once deductible met   All codes billable  No auth needed   Treatment Diagnosis:     ICD-10-CM    1. Impaired functional mobility, balance, gait, and endurance  Z74.09       2. Impaired range of motion of left knee  M25.662       3. Weakness of left lower extremity  R29.898          Medical Diagnosis:    Unilateral primary osteoarthritis, left knee [M17.12]    Referring Physician: Elver Austin MD  PCP: Alejandro Abebe MD                             Plan of care signed (Y/N):     Date of Patient follow up with Physician: 24     Progress Report/POC: NO  POC update due: (10 visits /OR AUTH LIMITS, whichever is less)  2024     Precautions/ Contra-indications:                                                                                          Latex allergy:  NO  Pacemaker:    NO  Contraindications for Manipulation: None and recent surgical history (relative)  Date of Surgery: 24  Other:     Preferred Language for Healthcare:   [x]English       []other:    SUBJECTIVE EXAMINATION     Patient Report/Comments: Pt reports her knee is feeling good today. She saw the doctor yesterday and he was pleased with her progress. She cut back on her mall walking this past week and has been feeling good. Patient has started to use a reciprocal pattern on stairs at home.      Test used Initial score  1/10/24 2024   Pain

## 2024-01-26 ENCOUNTER — APPOINTMENT (OUTPATIENT)
Dept: PHYSICAL THERAPY | Age: 64
End: 2024-01-26
Payer: COMMERCIAL

## 2024-01-26 PROCEDURE — 97112 NEUROMUSCULAR REEDUCATION: CPT

## 2024-01-26 PROCEDURE — 97110 THERAPEUTIC EXERCISES: CPT

## 2024-01-26 NOTE — FLOWSHEET NOTE
6     [facility] - Outpatient Rehabilitation and Therapy [address], [place] [address] office: [phone] fax: [phone]      Physical Therapy: TREATMENT/PROGRESS NOTE   Patient: Smita Drew (63 y.o. female)   Treatment Date: 2024   :  1960 MRN: [id number]   Visit #:   Insurance Allowable Auth Needed    []Yes    [x]No    Insurance: Payor: UMR / Plan: UMR / Product Type: *No Product type* /   Insurance ID: [id number] - (Commercial)  Secondary Insurance (if applicable):     60 per year hard max combined between disciplines   Covered at 80/20 once deductible met   All codes billable  No auth needed   Treatment Diagnosis:     ICD-10-CM    1. Impaired functional mobility, balance, gait, and endurance  Z74.09       2. Impaired range of motion of left knee  M25.662       3. Weakness of left lower extremity  R29.898          Medical Diagnosis:    Unilateral primary osteoarthritis, left knee [M17.12]    Referring Physician: Elver Austin MD  PCP: Alejandro Abebe MD                             Plan of care signed (Y/N):     Date of Patient follow up with Physician: 24     Progress Report/POC: NO  POC update due: (10 visits /OR AUTH LIMITS, whichever is less)  2024     Precautions/ Contra-indications:                                                                                          Latex allergy:  NO  Pacemaker:    NO  Contraindications for Manipulation: None and recent surgical history (relative)  Date of Surgery: 24  Other:     Preferred Language for Healthcare:   [x]English       []other:    SUBJECTIVE EXAMINATION     Patient Report/Comments: Pt notes she is alternating her feet going up and down stairs about 50% of the time. Not having any pain right now, just stiffness from possibly overdoing it yesterday.      Test used Initial score  1/10/24 2024   Pain Summary VAS 7 0/10   Functional questionnaire WOMAC 56/96  58% dysf    Other:

## 2024-01-30 ENCOUNTER — APPOINTMENT (OUTPATIENT)
Dept: PHYSICAL THERAPY | Age: 64
End: 2024-01-30
Payer: COMMERCIAL

## 2024-01-30 PROCEDURE — 97110 THERAPEUTIC EXERCISES: CPT

## 2024-01-30 PROCEDURE — 97530 THERAPEUTIC ACTIVITIES: CPT

## 2024-01-30 NOTE — FLOWSHEET NOTE
Post-Op Assessment Note    CV Status:  Stable  Pain Score: 0    Pain management: adequate     Mental Status:  Alert and awake   Hydration Status:  Euvolemic   PONV Controlled:  Controlled   Airway Patency:  Patent      Post Op Vitals Reviewed: Yes      Staff: CRNA         There were no known notable events for this encounter      /67 (05/13/23 1655)    Temp 99 1 °F (37 3 °C) (05/13/23 1655)    Pulse 86 (05/13/23 1655)   Resp 16 (05/13/23 1655)    SpO2 100 % (05/13/23 1655) EXAMINATION     Observation:     Test measurements: 1/12: AAROM knee flexion in supine with SOS = 120 deg; AROM knee ext = lack 3 deg;     1/16: AROM knee ext ROM in supine = 0 deg    1/19: AROM L knee in supine: 0-117 deg without manual stretching prior to measuring              1/23: AROM supine L knee- 1-120 degrees following heel slides    Exercises/Interventions:     Therapeutic Ex (92975)  resistance Sets/time Reps Notes/Cues/Progressions   Bike L 2 5 min  Retro revs to fwd revs    IB  HR/TR  2 x 30\"   2 X 30\"      HSS  2 x 30\" B     HFS  2 x 30\" B            Heel slides with SOS, heel on towel   10 L     Quad sets   Towel under ankle    LAQ   2 sec up, 2 sec down   Measuring for ROM      TG - double squats  1 20 1/19 1/24- VC to push through heels   TG - single leg squat   1 10 B 1/26 Added   SLR w/ quad set  1/24 VC to decrease speed and reset between reps          Leg Press - double leg 50# 2 10    Quad machine  25# 2 10    Hamstring curl machine 25# 2 10                         Manual Intervention (87741)  TIME     PROM knee flexion       PROM knee extension                           NMR re-education (52151) resistance Sets/time Reps CUES NEEDED   Square tiltboard taps - A/P, M/L  1/16   Square tiltboard balance - A/P, M/L  1/16   SLS   1/16   Alternating cone taps   1/16   Shuttle:  - NBOS EO  - NBOS EC  - Staggered  - DLS with trunk twist  - DLS with [place] UE lift  1/19   Step up and over on airex       Biodex  Motor control  Ball maze L 4  1/23 4-8 letter words.    Biodex Recovery Tony Easy 1/23: 43 bottles, 1638 pts, 830 m  1/26: 43 bottles, 2107 pts, 967m     Biodex Word search Hard  L4  8 words, from 5-8 letters    BOSU  - fwd step ups  - lat step ups Blue side  1/26   BOSU   - med/lat weight shifts Black side 1/26                        Therapeutic Activity (97088)  Sets/time     Forward step ups 8\"  1 10 L  1/23   Lateral step ups  8\" 1 10 L 1/23   Forward step downs - L planted 8\" 1 10 L  1/23

## 2024-02-02 ENCOUNTER — HOSPITAL ENCOUNTER (OUTPATIENT)
Dept: PHYSICAL THERAPY | Age: 64
Setting detail: THERAPIES SERIES
Discharge: HOME | End: 2024-02-02
Payer: COMMERCIAL

## 2024-02-02 PROCEDURE — 97110 THERAPEUTIC EXERCISES: CPT

## 2024-02-02 PROCEDURE — 97530 THERAPEUTIC ACTIVITIES: CPT

## 2024-02-02 NOTE — FLOWSHEET NOTE
endurance and strength so pt can return to PLOF.                                                                                                    Status: [] Progressing: [] Met: [] Not Met: [] Adjusted    Overall Progression Towards Functional goals/ Treatment Progress Update:  [] Patient is progressing as expected towards functional goals listed.    [] Progression is slowed due to complexities/Impairments listed.  [] Progression has been slowed due to co-morbidities.  [x] Plan just implemented, too soon (<30days) to assess goals progression   [] Goals require adjustment due to lack of progress  [] Patient is not progressing as expected and requires additional follow up with physician  [] Other:     CHARGE CAPTURE     PT CHARGE GRID   CPT Code (TIMED) minutes # CPT Code (UNTIMED) #     Therex (60181)  24 2  EVAL:LOW (61089 - Typically 20 minutes face-to-face)     Neuromusc. Re-ed (99027)    Re-Eval (92894)     Manual (04072)    Estim Unattended (51105)     Ther. Act (97039) 17 1  Mech. Traction (12614)     Gait (61293)    Dry Needle 1-2 muscle (80941)     Aquatic Therex (63147)    Dry Needle 3+ muscle (08153)     Iontophoresis (88500)    VASO (08449)     Ultrasound (32774)    Group Therapy (96001)     Estim Attended (10421)    Canalith Repositioning (27948)     Other: 41 3  Other:      Total Treatment Minutes 41        Charge Justification:  (05076) THERAPEUTIC EXERCISE - Provided verbal/tactile cueing for activities related to strengthening, flexibility, endurance, ROM performed to prevent loss of range of motion, maintain or improve muscular strength or increase flexibility, following either an injury or surgery.   (65090) THERAPEUTIC ACTIVITY - use of dynamic activities to improve functional performance. (Ex include squatting, ascending/descending stairs, walking, bending, lifting, catching, throwing, pushing, pulling, jumping.)  Direct, one on one contact, billed in 15-minute increments.    TREATMENT PLAN

## 2024-02-07 ENCOUNTER — HOSPITAL ENCOUNTER (OUTPATIENT)
Dept: PHYSICAL THERAPY | Age: 64
Setting detail: THERAPIES SERIES
Discharge: HOME | End: 2024-02-07
Payer: COMMERCIAL

## 2024-02-07 PROCEDURE — 97110 THERAPEUTIC EXERCISES: CPT

## 2024-02-07 PROCEDURE — 97530 THERAPEUTIC ACTIVITIES: CPT

## 2024-02-07 NOTE — PLAN OF CARE
measurements: 1/12: AAROM knee flexion in supine with SOS = 120 deg; AROM knee ext = lack 3 deg;     1/16: AROM knee ext ROM in supine = 0 deg    1/19: AROM L knee in supine: 0-117 deg without manual stretching prior to measuring              1/23: AROM supine L knee- 1-120 degrees following heel slides    2/7:             Mvmt (norm) AROM L AROM R  (From eval) Notes   KNEE Flex (140) 126 131      Ext (0) 0 0                    MMT L MMT R Notes         HIP Flexion 5 5 Supine    Abduction  5  5      ER 4+  5       IR  5  5      Extension  4+ 4+                  KNEE Flexion 5 5      Extension 5 5                    ANKLE DF 5 5      PF             Eval  1/10/24 POC  2/7/24   TUG (sec)    8.63 sec, no AD, with UEs to stand    5.92 sec, No AD   30 second sit to stand w/out UE (reps) 8x w/ UEs 15x w/o UEs   Gait speed (m/s)  4x10m fast walk  Result = 40m/total time 1.28 m/ sec 1.74 m/sec       Exercises/Interventions:     Therapeutic Ex (90849)  resistance Sets/time Reps Notes/Cues/Progressions   Bike L 3 6 min  Retro revs to fwd revs    IB  HR/TR  2 x 30\"   2 X 30\"      HSS  2 x 30\" B     HFS  2 x 30\" B            Heel slides with SOS, heel on towel      Quad sets   Towel under ankle    LAQ   2 sec up, 2 sec down   Measuring for ROM      TG - double squats  1/19 1/24- VC to push through heels   TG - single leg squat   1/26 Added   TG - hip abd      SLR w/ quad set   1/24 VC to decrease speed and reset between reps          Leg Press - double leg 50#    Quad machine  25#    Hamstring curl machine 25#    Testing for POC: ROM; MMT, WOMAC 2/7                    Manual Intervention (69530)  TIME     PROM knee flexion       PROM knee extension                           NMR re-education (45585) resistance Sets/time Reps CUES NEEDED   Square tiltboard taps - A/P, M/L  1/16   Square tiltboard balance - A/P, M/L  1/16   SLS   1/16   Alternating cone taps   1/16   Shuttle:  - NBOS EO  - NBOS EC  - Staggered  - DLS with trunk

## 2024-02-09 ENCOUNTER — HOSPITAL ENCOUNTER (OUTPATIENT)
Dept: PHYSICAL THERAPY | Age: 64
Setting detail: THERAPIES SERIES
Discharge: HOME | End: 2024-02-09
Payer: COMMERCIAL

## 2024-02-09 PROCEDURE — 97110 THERAPEUTIC EXERCISES: CPT

## 2024-02-09 PROCEDURE — 97530 THERAPEUTIC ACTIVITIES: CPT

## 2024-02-09 NOTE — FLOWSHEET NOTE
[facility] - Outpatient Rehabilitation and Therapy [address], [place] [address] office: [phone] fax: [phone]      Physical Therapy: TREATMENT/PROGRESS NOTE   Patient: Smita Drew (63 y.o. female)   Treatment Date: 2024   :  1960 MRN: [id number]   Visit #: 10 /18    1st POC = 12 V  2nd POC = 6 V    Insurance Allowable Auth Needed    []Yes    [x]No    Insurance: Payor: UMR / Plan: UMR / Product Type: *No Product type* /   Insurance ID: [id number] - (Commercial)  Secondary Insurance (if applicable):     60 per year hard max combined between disciplines   Covered at 80/20 once deductible met   All codes billable  No auth needed   Treatment Diagnosis:     ICD-10-CM    1. Impaired functional mobility, balance, gait, and endurance  Z74.09       2. Impaired range of motion of left knee  M25.662       3. Weakness of left lower extremity  R29.898          Medical Diagnosis:    Unilateral primary osteoarthritis, left knee [M17.12]    Referring Physician: Elver Austin MD  PCP: Alejandro Abebe MD                             Plan of care signed (Y/N):     Date of Patient follow up with Physician: 24     Progress Report/POC: NO  POC update due: (10 visits /OR AUTH LIMITS, whichever is less)  2024     Precautions/ Contra-indications:                                                                                          Latex allergy:  NO  Pacemaker:    NO  Contraindications for Manipulation: None and recent surgical history (relative)  Date of Surgery: 24  Other:     Preferred Language for Healthcare:   [x]English       []other:    SUBJECTIVE EXAMINATION     Patient Report/Comments: Pt reports she was able to sleep in bed last night for the first time in a long time. It did take her about 1.5 hrs to fall asleep, and then woke up about an hour later in pain. But once the knee calmed down it was okay. Was able to walk about 2.5 miles on a hilly walk

## 2024-02-12 NOTE — TELEPHONE ENCOUNTER
[facility]    Progress Note    Raul Freed Jr. Patient Status:  Inpatient    1941 MRN [id number]   Location [facility] 5SW/SE Attending Kerrie Guzman, DO   Hosp Day # 4 PCP Rao Beyer MD       Subjective:   Raul Freed Jr. is a(n) 82 year old   male with complaints of diarrhea, lethargy.    He is C.diff positive, frequent loose stools appear to be resolving with oral Vanc  Perianal discomfort from diarrhea has resolved and he denies anal pain.  He denies abdominal pain.  - Nausea, - Vomiting    Assessment and Plan:   Raul Freed Jr. is a(n) 82 year old male    HD 5 with Perianal abscess and proctocolitis.    Patient with recent hospitalization for same problem, was discharged with Invanz.  Patient returned with continued weakness and diarrhea. CT showed increase in abscess and worsening proctocolitis.    C. Diff (+) - started on PO Vancomycin 2024    Location of abscess is primarily intramural (extent of abscess vs \"proctocolitis\" unclear), within rectal wall and on left side of anorectal junction  Likelihood of being able to drain this surgically may be limited as this is fairly extensive    Clinically he is doing well following initiation of treatment for C. Diff  He denies anal pain and is nontoxic (afebrile with normal HR and WBC)    Plan  Given his overall clinical improvement and confusing clinical picture (proctocolitis vs intramural abscess), lack of palpable abscess on  JEFRY and rectal MRI with extensive rectal edema, an EUA is very low yield at this point.    Case discussed with Dr. Alvarado, he will consider gentle and limited flex sig in next day or two to assess component of colitis  Continue antibiotics per ID  Will likely reconsider interval imaging and need for EUA in the near future      Objective:   Blood pressure 142/72, pulse 76, temperature 98.5 °F (36.9 °C), temperature source Oral, resp. rate 18, height 5' 10\" (1.778 m), weight 226 lb  Pt is calling. She thought she was supposed to have medication called in the last time she saw Dr. Gautam Moctezuma on 2/13/23. She says the pharmacy does not have anything. She is not sure if she just misunderstood or if something was supposed to be called in for her? She uses [facility] on [address] [place] [address] [phone]  Fax [phone]    Also her one month follow-up is on 3/27/23. Her yearly is scheduled for 4/19/23. She is wondering if it is ok for these appointments that close together? (102.5 kg), SpO2 95%. I/O last 3 completed shifts:  In: 639 [P.O.:240; I.V.:299; IV PIGGYBACK:100]  Out: 0    Much more alert, communicative and coherent  General appearance: alert, appears stated age, cooperative, and moderately obese  Neck: no JVD and supple, symmetrical, trachea midline  Pulmonary: No labored breathing, equal respiratory excursion  Cardiovascular: regular rate and rhythm  Abdominal: soft, non-tender; bowel sounds normal; no masses,  no organomegaly.  Extremities: extremities normal, atraumatic, no cyanosis or edema  Anal:   Perianal skin with dry scaling, less irritated, not tender to palpation  JEFRY with 2 assist - abscess not palpable, scarring and rigidity noted with possible ulcer posterior midline about level of dentate        Results:       Recent Labs   Lab 02/08/24  0854 02/09/24  0503 02/10/24  0510 02/12/24  0606   RBC 3.38* 3.25* 3.19* 3.30*   HGB 11.2* 10.4* 10.4* 10.7*   HCT 32.5* 31.1* 30.9* 31.3*   MCV 96.2 95.7 96.9 94.8   MCH 33.1 32.0 32.6 32.4   MCHC 34.5 33.4 33.7 34.2   RDW 13.3 13.4 13.2 13.0   NEPRELIM 13.70*  --   --   --    WBC 19.3* 19.1* 17.9* 8.5   .0 232.0 215.0 283.0     No results found for: \"PT\", \"INR\"    Recent Labs   Lab 02/08/24  0854 02/09/24  0503 02/10/24  0510 02/11/24  0530 02/12/24  0606   * 106* 115*  --  112*   BUN 17 11 9  --  6*   CREATSERUM 1.09 0.89 0.91  --  0.89   CA 8.9 8.4* 8.1*  --  8.9   ALB 3.8  --   --   --   --     140 140  --  142   K 4.2 3.6 3.2* 3.5 3.3*    106 109  --  111   CO2 27.0 26.0 26.0  --  25.0   ALKPHO 85  --   --   --   --    AST 20  --   --   --   --    ALT 27  --   --   --   --    BILT 0.6  --   --   --   --    TP 7.6  --   --   --   --              MRI PELVIS (SOFT TISSUE) (W+WO) (CPT=72197)    Result Date: 2/12/2024  CONCLUSION:  Severely artifactually degraded examination. Within these parameters: 1. Extensive rectal edema with fluid collections suggesting intramural abscesses, possibly with  intersphincteric fistula formation. These are difficult to characterize given the substantial motion artifact.  2. Lesser incidental findings as above.    Dictated by (CST): Mike Baum MD on 2/12/2024 at 1:53 PM     Finalized by (CST): Mike Baum MD on 2/12/2024 at 2:18 PM                 Time spent in direct patient contact and decision making as well as counseling/coordination of care:    73559- 35 min      Musa Shelby MD

## 2024-02-13 ENCOUNTER — HOSPITAL ENCOUNTER (OUTPATIENT)
Dept: PHYSICAL THERAPY | Age: 64
Setting detail: THERAPIES SERIES
Discharge: HOME | End: 2024-02-13
Payer: COMMERCIAL

## 2024-02-13 PROCEDURE — 97530 THERAPEUTIC ACTIVITIES: CPT

## 2024-02-13 PROCEDURE — 97110 THERAPEUTIC EXERCISES: CPT

## 2024-02-13 NOTE — FLOWSHEET NOTE
CHARGE GRID   CPT Code (TIMED) minutes # CPT Code (UNTIMED) #     Therex (29294)  19 1  EVAL:LOW (14830 - Typically 20 minutes face-to-face)     Neuromusc. Re-ed (78347)    Re-Eval (69881)     Manual (38280)    Estim Unattended (80964)     Ther. Act (54906) 24 2  Mech. Traction (24052)     Gait (51369)    Dry Needle 1-2 muscle (93501)     Aquatic Therex (01797)    Dry Needle 3+ muscle (83507)     Iontophoresis (10641)    VASO (95144)     Ultrasound (13526)    Group Therapy (75908)     Estim Attended (77867)    Canalith Repositioning (33968)     Other: 43 3  Other:      Total Treatment Minutes 43        Charge Justification:  (22025) THERAPEUTIC EXERCISE - Provided verbal/tactile cueing for activities related to strengthening, flexibility, endurance, ROM performed to prevent loss of range of motion, maintain or improve muscular strength or increase flexibility, following either an injury or surgery.   (01748) THERAPEUTIC ACTIVITY - use of dynamic activities to improve functional performance. (Ex include squatting, ascending/descending stairs, walking, bending, lifting, catching, throwing, pushing, pulling, jumping.)  Direct, one on one contact, billed in 15-minute increments.    TREATMENT PLAN   Plan: Cont POC- Continue emphasis/focus on exercise progression, modulating pain, promoting relaxation, increasing ROM, improving soft tissue extensibility, allowing for proper ROM, and static and dynamic balance. Next visit plan to progress reps, add new exercises, and progress balance  Sit to stands from various heights and quad eccentric control to decrease difficulty with stairs.     Electronically Signed by Hannah Garcia, PT  DPT [facility]            Date: 02/13/2024     Note: If patient does not return for scheduled/recommended follow up visits, this note will serve as a discharge from care along with the most recent update on progress.

## 2024-02-15 ENCOUNTER — HOSPITAL ENCOUNTER (OUTPATIENT)
Dept: PHYSICAL THERAPY | Age: 64
Setting detail: THERAPIES SERIES
Discharge: HOME | End: 2024-02-15
Payer: COMMERCIAL

## 2024-02-15 PROCEDURE — 97530 THERAPEUTIC ACTIVITIES: CPT

## 2024-02-15 PROCEDURE — 97110 THERAPEUTIC EXERCISES: CPT

## 2024-02-15 NOTE — FLOWSHEET NOTE
[facility] - Outpatient Rehabilitation and Therapy [address], [place] [address] office: [phone] fax: [phone]      Physical Therapy: TREATMENT/PROGRESS NOTE   Patient: Smita Drew (63 y.o. female)   Treatment Date: 02/15/2024   :  1960 MRN: [id number]   Visit #:     1st POC = 12 V  2nd POC = 6 V    Insurance Allowable Auth Needed    []Yes    [x]No    Insurance: Payor: UMR / Plan: UMR / Product Type: *No Product type* /   Insurance ID: [id number] - (Commercial)  Secondary Insurance (if applicable):     60 per year hard max combined between disciplines   Covered at 80/20 once deductible met   All codes billable  No auth needed   Treatment Diagnosis:     ICD-10-CM    1. Impaired functional mobility, balance, gait, and endurance  Z74.09       2. Impaired range of motion of left knee  M25.662       3. Weakness of left lower extremity  R29.898          Medical Diagnosis:    Unilateral primary osteoarthritis, left knee [M17.12]    Referring Physician: Elver Austin MD  PCP: Alejandro Abebe MD                             Plan of care signed (Y/N):     Date of Patient follow up with Physician: 24     Progress Report/POC: NO  POC update due: (10 visits /OR AUTH LIMITS, whichever is less)  2024     Precautions/ Contra-indications:                                                                                          Latex allergy:  NO  Pacemaker:    NO  Contraindications for Manipulation: None and recent surgical history (relative)  Date of Surgery: 24  Other:     Preferred Language for Healthcare:   [x]English       []other:    SUBJECTIVE EXAMINATION     Patient Report/Comments:       Test used Initial score  1/10/24 POC  2/7/24 02/15/2024   Pain Summary VAS 7 0 0   Functional questionnaire WOMAC 5696  58% dysf   13.5% dysf    Other:                  OBJECTIVE EXAMINATION     Observation:     Test measurements: : AAROM knee flexion in

## 2024-02-21 ENCOUNTER — HOSPITAL ENCOUNTER (OUTPATIENT)
Dept: PHYSICAL THERAPY | Age: 64
Setting detail: THERAPIES SERIES
Discharge: HOME | End: 2024-02-21
Payer: COMMERCIAL

## 2024-02-21 PROCEDURE — 97110 THERAPEUTIC EXERCISES: CPT

## 2024-02-21 PROCEDURE — 97530 THERAPEUTIC ACTIVITIES: CPT

## 2024-02-21 NOTE — FLOWSHEET NOTE
[facility] - Outpatient Rehabilitation and Therapy [address], [place] [address] office: [phone] fax: [phone]      Physical Therapy: TREATMENT/PROGRESS NOTE   Patient: Smita Drew (63 y.o. female)   Treatment Date: 2024   :  1960 MRN: [id number]   Visit #:     1st POC = 12 V  2nd POC = 6 V    Insurance Allowable Auth Needed    []Yes    [x]No    Insurance: Payor: UMR / Plan: UMR / Product Type: *No Product type* /   Insurance ID: [id number] - (Commercial)  Secondary Insurance (if applicable):     60 per year hard max combined between disciplines   Covered at 80/20 once deductible met   All codes billable  No auth needed   Treatment Diagnosis:     ICD-10-CM    1. Impaired functional mobility, balance, gait, and endurance  Z74.09       2. Impaired range of motion of left knee  M25.662       3. Weakness of left lower extremity  R29.898          Medical Diagnosis:    Unilateral primary osteoarthritis, left knee [M17.12]    Referring Physician: Elver Austin MD  PCP: Alejandro Abebe MD                             Plan of care signed (Y/N): N - has been sent via fax and in basket     Date of Patient follow up with Physician: 24     Progress Report/POC: NO  POC update due: (10 visits /OR AUTH LIMITS, whichever is less)  3/7/2024     Precautions/ Contra-indications:                                                                                          Latex allergy:  NO  Pacemaker:    NO  Contraindications for Manipulation: None and recent surgical history (relative)  Date of Surgery: 24  Other:     Preferred Language for Healthcare:   [x]English       []other:    SUBJECTIVE EXAMINATION     Patient Report/Comments:  Pt reports she is running late today but still does not have any pain.      Test used Initial score  1/10/24 POC  2024   Pain Summary VAS 7 0 0   Functional questionnaire WOMAC 5696  58% dysf   13.5% dysf    Other:

## 2024-02-23 ENCOUNTER — HOSPITAL ENCOUNTER (OUTPATIENT)
Dept: PHYSICAL THERAPY | Age: 64
Setting detail: THERAPIES SERIES
Discharge: HOME | End: 2024-02-23
Payer: COMMERCIAL

## 2024-02-23 PROCEDURE — 97530 THERAPEUTIC ACTIVITIES: CPT

## 2024-02-23 PROCEDURE — 97110 THERAPEUTIC EXERCISES: CPT

## 2024-02-23 NOTE — FLOWSHEET NOTE
complexities/Impairments listed.  [] Progression has been slowed due to co-morbidities.  [x] Plan just implemented, too soon (<30days) to assess goals progression   [] Goals require adjustment due to lack of progress  [] Patient is not progressing as expected and requires additional follow up with physician  [] Other:     CHARGE CAPTURE     PT CHARGE GRID   CPT Code (TIMED) minutes # CPT Code (UNTIMED) #     Therex (81432)  27 2  EVAL:LOW (39239 - Typically 20 minutes face-to-face)     Neuromusc. Re-ed (98135)    Re-Eval (60497)     Manual (81822)    Estim Unattended (76210)     Ther. Act (77680) 17 1  Mech. Traction (76820)     Gait (04328)    Dry Needle 1-2 muscle (86077)     Aquatic Therex (75164)    Dry Needle 3+ muscle (20561)     Iontophoresis (62549)    VASO (22117)     Ultrasound (42798)    Group Therapy (37361)     Estim Attended (55161)    Canalith Repositioning (08715)     Other: 44 3  Other:      Total Treatment Minutes 44        Charge Justification:  (90172) THERAPEUTIC EXERCISE - Provided verbal/tactile cueing for activities related to strengthening, flexibility, endurance, ROM performed to prevent loss of range of motion, maintain or improve muscular strength or increase flexibility, following either an injury or surgery.   (36524) THERAPEUTIC ACTIVITY - use of dynamic activities to improve functional performance. (Ex include squatting, ascending/descending stairs, walking, bending, lifting, catching, throwing, pushing, pulling, jumping.)  Direct, one on one contact, billed in 15-minute increments.    TREATMENT PLAN   Plan: Cont POC- Continue emphasis/focus on exercise progression and static and dynamic balance. Next visit plan to progress reps, add new exercises, and progress balance       Electronically Signed by Hannah Garcia, PT  DPT [facility]            Date: 02/23/2024     Note: If patient does not return for scheduled/recommended follow up visits, this note will serve as a discharge from care

## 2024-02-27 ENCOUNTER — HOSPITAL ENCOUNTER (OUTPATIENT)
Dept: PHYSICAL THERAPY | Age: 64
Setting detail: THERAPIES SERIES
Discharge: HOME | End: 2024-02-27
Payer: COMMERCIAL

## 2024-02-27 PROCEDURE — 97530 THERAPEUTIC ACTIVITIES: CPT

## 2024-02-27 PROCEDURE — 97110 THERAPEUTIC EXERCISES: CPT

## 2024-02-27 NOTE — FLOWSHEET NOTE
[facility] - Outpatient Rehabilitation and Therapy [address], [place] [address] office: [phone] fax: [phone]      Physical Therapy: TREATMENT/PROGRESS NOTE   Patient: Smita Drew (63 y.o. female)   Treatment Date: 2024   :  1960 MRN: [id number]   Visit #: 15 /18    1st POC = 12 V  2nd POC = 6 V    Insurance Allowable Auth Needed    []Yes    [x]No    Insurance: Payor: UMR / Plan: UMR / Product Type: *No Product type* /   Insurance ID: [id number] - (Commercial)  Secondary Insurance (if applicable):     60 per year hard max combined between disciplines   Covered at 80/20 once deductible met   All codes billable  No auth needed   Treatment Diagnosis:     ICD-10-CM    1. Impaired functional mobility, balance, gait, and endurance  Z74.09       2. Impaired range of motion of left knee  M25.662       3. Weakness of left lower extremity  R29.898          Medical Diagnosis:    Unilateral primary osteoarthritis, left knee [M17.12]    Referring Physician: Elver Austin MD  PCP: Alejandro Abebe MD                             Plan of care signed (Y/N): N - has been sent via fax and in basket     Date of Patient follow up with Physician: 24     Progress Report/POC: NO  POC update due: (10 visits /OR AUTH LIMITS, whichever is less)  3/7/2024     Precautions/ Contra-indications:                                                                                          Latex allergy:  NO  Pacemaker:    NO  Contraindications for Manipulation: None and recent surgical history (relative)  Date of Surgery: 24  Other:     Preferred Language for Healthcare:   [x]English       []other:    SUBJECTIVE EXAMINATION     Patient Report/Comments:   Patient reports she is feeling very good. She is down to icing 1x/day and doesn't even really feel like that is necessary anymore. Will continue to ice if activity is increased during the day.      Test used Initial score  1/10/24

## 2024-03-01 ENCOUNTER — HOSPITAL ENCOUNTER (OUTPATIENT)
Dept: PHYSICAL THERAPY | Age: 64
Setting detail: THERAPIES SERIES
Discharge: HOME | End: 2024-03-01
Payer: COMMERCIAL

## 2024-03-01 PROCEDURE — 97110 THERAPEUTIC EXERCISES: CPT

## 2024-03-01 PROCEDURE — 97530 THERAPEUTIC ACTIVITIES: CPT

## 2024-03-01 NOTE — PLAN OF CARE
[facility] - Outpatient Rehabilitation and Therapy [address], [place] [address] office: [phone] fax: [phone]  Physical Therapy Discharge Summary    Dear Elver Austin MD  ,    We had the pleasure of treating the following patient for physical therapy services at [facility]. A summary of our findings can be found in the updated assessment below.  This includes our plan of care.  If you have any questions or concerns regarding these findings, please do not hesitate to contact me at the office phone number checked above.  Thank you for the referral.     Physician Signature:________________________________Date:__________________  By signing above (or electronic signature), therapist's plan is approved by physician      Functional Outcome: WOMAC = 5% dysfunction    Overall Response to Treatment:   []Patient is responding well to treatment and improvement is noted with regards to goals   []Patient should continue to improve in reasonable time if they continue HEP   []Patient has plateaued and is no longer responding to skilled PT intervention    []Patient is getting worse and would benefit from return to referring MD   []Patient unable to adhere to initial POC   [x]Other: See Eval and TRANSITION TO HEP/DC FORMAL PT: Patient is currently independent with symptom management and home exercise program. Patient reports understanding of the importance of continued compliance with home exercise program after discharge.  Patient has reached 5/5 long term goals and should reach all additional goals with compliance to home exercise program upon discharge.  Patient has no remaining functional limitations at this time. She has progressed exceptionally well with PT intervention and is ready for DC.       3/1/24:             Mvmt (norm) AROM L AROM R  (From eval) Notes   KNEE Flex [phone]      Ext (0) 0 0                    MMT L MMT R Notes         HIP

## 2024-03-04 ENCOUNTER — OFFICE VISIT (OUTPATIENT)
Dept: DERMATOLOGY | Age: 64
End: 2024-03-04
Payer: COMMERCIAL

## 2024-03-04 DIAGNOSIS — L82.1 SEBORRHEIC KERATOSES: ICD-10-CM

## 2024-03-04 DIAGNOSIS — D22.9 BENIGN NEVUS: ICD-10-CM

## 2024-03-04 DIAGNOSIS — L66.9 ALOPECIA, SCARRING: ICD-10-CM

## 2024-03-04 DIAGNOSIS — Z85.828 HISTORY OF BASAL CELL CANCER: ICD-10-CM

## 2024-03-04 DIAGNOSIS — D48.5 NEOPLASM OF UNCERTAIN BEHAVIOR OF SKIN: Primary | ICD-10-CM

## 2024-03-04 PROCEDURE — 99213 OFFICE O/P EST LOW 20 MIN: CPT | Performed by: DERMATOLOGY

## 2024-03-04 PROCEDURE — 11102 TANGNTL BX SKIN SINGLE LES: CPT | Performed by: DERMATOLOGY

## 2024-03-04 PROCEDURE — 11900 INJECT SKIN LESIONS </W 7: CPT | Performed by: DERMATOLOGY

## 2024-03-04 NOTE — PROGRESS NOTES
[facility]  Beulah Hagan M.D.  [phone]       Smita Drew  1960    63 y.o. female     Date of Visit: 3/4/2024    Chief Complaint:   Chief Complaint   Patient presents with    Skin Exam        I was asked to see this patient by Dr. Venegas ref. provider found.    History of Present Illness:  1. TBSE    Multiple nevi. Patient has not noticed any new or changing pigmented lesions.   Stable in size, shape, color. Not itching, bleeding.     Seborrheic keratoses.  Increasing number of hyperkeratotic stuck on papules and plaques over the torso.  No discrete lesion itching, bleeding, becoming symptomatic.     Scarring alopecia has flared-now with scale and irritation, had previously been quiescent after intralesional Kenalog.      Watches 6 grandchildren every day during the summer, taking care of parents in her 90s-dementia     Skin history:  History of basal cell carcinoma left chest and right forearm      12/16 Cutaneous sarcoid with no evidence of systemic involvement. Developed at site of IV. Tissue cultures negative.  5/17 superficial basal cell carcinoma central chest status post curettage  3/20 superficial basal cell carcinoma left medial lower leg  3/20 superficial basal cell carcinoma right medial lower leg  3/20 superficial basal cell carcinoma glabella status post Mohs 6/1/2020 (COVID)  5/20 left thenar forearm superficial basal cell carcinoma status post curettage      Seborrheic dermatitis-triamcinolone 0.025% cream or Elidel 1% cream     Rosacea-MetroGel 0.75%     Family history positive for nonmelanoma skin cancer in both parents       Review of Systems:  Constitutional: Reports general sense of well-being       Past Medical History, Surgical History, Family History, Medications and Allergies reviewed.    Social History:   Social History     Socioeconomic History    Marital status:      Spouse name: Not on file    Number of children: Not on file    Years of education: Not on file  Consent: The risks of atrophy were reviewed with the patient.

## 2024-03-07 LAB
CLINICAL INFORMATION: NORMAL
CPT CODE: NORMAL
CPT DISCLAIMER: NORMAL
DEMOGRAPHIC HEADER: NORMAL
DIAGNOSIS CATEGORY: NORMAL
DIAGNOSIS: NORMAL
Lab: NORMAL
MICROSCOPIC EXAMINATION: NORMAL
PATHOLOGIST: NORMAL
PERFORMING LOCATION: NORMAL
PLACE OF SERVICE: NORMAL

## 2024-03-21 ENCOUNTER — TELEPHONE (OUTPATIENT)
Dept: FAMILY MEDICINE CLINIC | Age: 64
End: 2024-03-21

## 2024-03-21 DIAGNOSIS — Z00.00 WELL ADULT EXAM: Primary | ICD-10-CM

## 2024-03-21 DIAGNOSIS — I10 ESSENTIAL HYPERTENSION: Primary | ICD-10-CM

## 2024-03-21 DIAGNOSIS — D50.9 IRON DEFICIENCY ANEMIA, UNSPECIFIED IRON DEFICIENCY ANEMIA TYPE: ICD-10-CM

## 2024-03-21 NOTE — TELEPHONE ENCOUNTER
Pt advise and will like to know if her iron will be check at this time? Pt mentioned something being elevated last time and wanted to make sure. Please advise

## 2024-03-21 NOTE — TELEPHONE ENCOUNTER
Pt called she never received her lab work the last time she was here. She is wanting to pick the lab work if possible.     Smita's callback # [phone]      Please advise...

## 2024-04-10 ENCOUNTER — OFFICE VISIT (OUTPATIENT)
Dept: DERMATOLOGY | Age: 64
End: 2024-04-10
Payer: COMMERCIAL

## 2024-04-10 DIAGNOSIS — L66.9 ALOPECIA, SCARRING: ICD-10-CM

## 2024-04-10 DIAGNOSIS — D48.5 NEOPLASM OF UNCERTAIN BEHAVIOR OF SKIN: Primary | ICD-10-CM

## 2024-04-10 PROCEDURE — 11102 TANGNTL BX SKIN SINGLE LES: CPT | Performed by: DERMATOLOGY

## 2024-04-10 PROCEDURE — 11900 INJECT SKIN LESIONS </W 7: CPT | Performed by: DERMATOLOGY

## 2024-04-10 PROCEDURE — 11103 TANGNTL BX SKIN EA SEP/ADDL: CPT | Performed by: DERMATOLOGY

## 2024-04-10 NOTE — PROGRESS NOTES
[facility]  Beulah Hagan M.D.  [phone]       Smita Drew  1960    63 y.o. female     Date of Visit: 4/10/2024    Chief Complaint:   Chief Complaint   Patient presents with    Skin Lesion        I was asked to see this patient by Dr. Venegas ref. provider found.    History of Present Illness:  1.  Lichen plano pilaris, flaring.  Discrete area of alopecia left vertex of scalp and several smaller crusted papules consistent with early stages lichen plano pilaris.  Patient concerned about crusted area at margin of alopecia-seems larger than previous involvement.    Also has noted a pink papule on her right cheek.  Was treated with liquid nitrogen previously and improved but never fully resolved.  Now seems to become crusted-may have bled.    Watches 6 grandchildren every day during the summer, taking care of parents in her 90s-dementia     Skin history:  History of basal cell carcinoma left chest and right forearm      12/16 Cutaneous sarcoid with no evidence of systemic involvement. Developed at site of IV. Tissue cultures negative.  5/17 superficial basal cell carcinoma central chest status post curettage  3/20 superficial basal cell carcinoma left medial lower leg  3/20 superficial basal cell carcinoma right medial lower leg  3/20 superficial basal cell carcinoma glabella status post Mohs 6/1/2020 (COVID)  5/20 left thenar forearm superficial basal cell carcinoma status post curettage  10/22Left vertex of scalp scarring alopecia consistent with lichen plano pilaris  3/24 upper spinal back dysplastic nevus, mild      Seborrheic dermatitis-triamcinolone 0.025% cream or Elidel 1% cream     Rosacea-MetroGel 0.75%     Family history positive for nonmelanoma skin cancer in both parents       Review of Systems:  Constitutional: Reports general sense of well-being       Past Medical History, Surgical History, Family History, Medications and Allergies reviewed.    Social History:   Social History

## 2024-04-12 DIAGNOSIS — E78.00 HYPERCHOLESTEREMIA: ICD-10-CM

## 2024-04-12 RX ORDER — ATORVASTATIN CALCIUM 20 MG/1
TABLET, FILM COATED ORAL
Qty: 90 TABLET | Refills: 0 | Status: SHIPPED | OUTPATIENT
Start: 2024-04-12

## 2024-04-12 NOTE — TELEPHONE ENCOUNTER
Medication:   Requested Prescriptions     Pending Prescriptions Disp Refills    atorvastatin (LIPITOR) 20 MG tablet [Pharmacy Med Name: Atorvastatin Calcium Oral Tablet 20 MG] 90 tablet 0     Sig: TAKE 1 TABLET BY MOUTH EVERY DAY      Last Filled:      Patient Phone Number: [phone] (home)     Last appt: 12/21/2023   Next appt: 4/16/2024    Last OARRS:       2/4/2016    12:51 PM   RX Monitoring   Attestation The Prescription Monitoring Report for this patient was reviewed today.   Periodic Controlled Substance Monitoring No signs of potential drug abuse or diversion identified.     PDMP Monitoring:    Last PDMP [place] as Reviewed ([place]):  Review User Review Instant Review Result          Preferred Pharmacy:   [id number] [place], [place] - [address] - P [phone] -  [phone]  Count includes the [facility] [address]  [facility] [address]  Phone: [phone] Fax: [phone]    [id number] - [place], [place] - [address] - P [phone] - F [phone]  [address]  [facility] [address]  Phone: [phone] Fax: [phone]

## 2024-04-13 SDOH — ECONOMIC STABILITY: INCOME INSECURITY: HOW HARD IS IT FOR YOU TO PAY FOR THE VERY BASICS LIKE FOOD, HOUSING, MEDICAL CARE, AND HEATING?: NOT HARD AT ALL

## 2024-04-13 SDOH — ECONOMIC STABILITY: FOOD INSECURITY: WITHIN THE PAST 12 MONTHS, THE FOOD YOU BOUGHT JUST DIDN'T LAST AND YOU DIDN'T HAVE MONEY TO GET MORE.: NEVER TRUE

## 2024-04-13 SDOH — ECONOMIC STABILITY: FOOD INSECURITY: WITHIN THE PAST 12 MONTHS, YOU WORRIED THAT YOUR FOOD WOULD RUN OUT BEFORE YOU GOT MONEY TO BUY MORE.: NEVER TRUE

## 2024-04-13 ASSESSMENT — PATIENT HEALTH QUESTIONNAIRE - PHQ9
1. LITTLE INTEREST OR PLEASURE IN DOING THINGS: NOT AT ALL
SUM OF ALL RESPONSES TO PHQ9 QUESTIONS 1 & 2: 0
SUM OF ALL RESPONSES TO PHQ QUESTIONS 1-9: 0
SUM OF ALL RESPONSES TO PHQ QUESTIONS 1-9: 0
SUM OF ALL RESPONSES TO PHQ9 QUESTIONS 1 & 2: 0
1. LITTLE INTEREST OR PLEASURE IN DOING THINGS: NOT AT ALL
2. FEELING DOWN, DEPRESSED OR HOPELESS: NOT AT ALL
2. FEELING DOWN, DEPRESSED OR HOPELESS: NOT AT ALL
SUM OF ALL RESPONSES TO PHQ QUESTIONS 1-9: 0
SUM OF ALL RESPONSES TO PHQ QUESTIONS 1-9: 0

## 2024-04-15 ENCOUNTER — TELEPHONE (OUTPATIENT)
Dept: FAMILY MEDICINE CLINIC | Age: 64
End: 2024-04-15

## 2024-04-15 DIAGNOSIS — R35.0 URINARY FREQUENCY: ICD-10-CM

## 2024-04-15 DIAGNOSIS — R30.0 DYSURIA: Primary | ICD-10-CM

## 2024-04-15 LAB — DERMATOLOGY PATHOLOGY REPORT: ABNORMAL

## 2024-04-15 NOTE — TELEPHONE ENCOUNTER
Pt has appointment tomorrow with Dr. Abebe. She think she has a UTI & wants him to send a test to [facility] or wanted to know if she could come to the office to pick it up, so she can be treated tomorrow as well.    Smita's callback # [phone]      Please advise...

## 2024-04-16 ENCOUNTER — OFFICE VISIT (OUTPATIENT)
Dept: FAMILY MEDICINE CLINIC | Age: 64
End: 2024-04-16
Payer: COMMERCIAL

## 2024-04-16 VITALS
SYSTOLIC BLOOD PRESSURE: 117 MMHG | WEIGHT: 142 LBS | OXYGEN SATURATION: 99 % | BODY MASS INDEX: 25.15 KG/M2 | RESPIRATION RATE: 12 BRPM | DIASTOLIC BLOOD PRESSURE: 68 MMHG | HEART RATE: 71 BPM | TEMPERATURE: 97.5 F

## 2024-04-16 DIAGNOSIS — R71.8 MICROCYTIC RED BLOOD CELLS: Primary | ICD-10-CM

## 2024-04-16 DIAGNOSIS — E87.6 HYPOKALEMIA: ICD-10-CM

## 2024-04-16 DIAGNOSIS — I10 ESSENTIAL HYPERTENSION: ICD-10-CM

## 2024-04-16 DIAGNOSIS — C44.319 BASAL CELL CARCINOMA (BCC) OF RIGHT CHEEK: Primary | ICD-10-CM

## 2024-04-16 DIAGNOSIS — E78.00 HYPERCHOLESTEREMIA: ICD-10-CM

## 2024-04-16 PROCEDURE — 99214 OFFICE O/P EST MOD 30 MIN: CPT | Performed by: FAMILY MEDICINE

## 2024-04-16 PROCEDURE — 3074F SYST BP LT 130 MM HG: CPT | Performed by: FAMILY MEDICINE

## 2024-04-16 PROCEDURE — 3078F DIAST BP <80 MM HG: CPT | Performed by: FAMILY MEDICINE

## 2024-04-16 RX ORDER — MELOXICAM 15 MG/1
15 TABLET ORAL DAILY
COMMUNITY

## 2024-04-16 RX ORDER — ASPIRIN 325 MG
325 TABLET ORAL DAILY
COMMUNITY
Start: 2016-04-07

## 2024-04-16 RX ORDER — TRIAMTERENE AND HYDROCHLOROTHIAZIDE 37.5; 25 MG/1; MG/1
1 CAPSULE ORAL DAILY
Qty: 90 CAPSULE | Refills: 3 | Status: SHIPPED | OUTPATIENT
Start: 2024-04-16

## 2024-04-16 SDOH — ECONOMIC STABILITY: FOOD INSECURITY: WITHIN THE PAST 12 MONTHS, YOU WORRIED THAT YOUR FOOD WOULD RUN OUT BEFORE YOU GOT MONEY TO BUY MORE.: NEVER TRUE

## 2024-04-16 SDOH — ECONOMIC STABILITY: FOOD INSECURITY: WITHIN THE PAST 12 MONTHS, THE FOOD YOU BOUGHT JUST DIDN'T LAST AND YOU DIDN'T HAVE MONEY TO GET MORE.: NEVER TRUE

## 2024-04-16 SDOH — ECONOMIC STABILITY: INCOME INSECURITY: HOW HARD IS IT FOR YOU TO PAY FOR THE VERY BASICS LIKE FOOD, HOUSING, MEDICAL CARE, AND HEATING?: NOT HARD AT ALL

## 2024-04-16 NOTE — PROGRESS NOTES
Sensory: Sensation is intact.      Motor: Motor function is intact.   Psychiatric:         Behavior: Behavior is cooperative.         Assessment:      Smita was seen today for follow-up, hyperlipidemia and hypertension.    Diagnoses and all orders for this visit:    Microcytic red blood cells  -     Iron and TIBC; Future    Essential hypertension    Hypercholesteremia    Hypokalemia    Other orders  -     triamterene-hydroCHLOROthiazide (DYAZIDE) 37.5-25 MG per capsule; Take 1 capsule by mouth daily            Plan:      Reviewed labs and test results with patient.    Maintain current blood pressure management    With the persistent microcytosis we will proceed with repeat iron studies at this time.  This may just be simple postoperative anemia that is improving.    Continue with potassium supplementation    Continue with cholesterol management    Patient received counseling on the following healthy behaviors: nutrition and exercise     Patient given educational materials     Health maintenance updated    Discussed use, benefit, and side effects of prescribed medications.  Barriers to medication compliance addressed.  All patient questions answered.  Pt voiced understanding.     Patient needs RTC in 6 months.    Medical decision making of moderate complexity.    Please note that this chart was generated using Dragon dictation software. Although every effort was made to ensure the accuracy of this automated transcription, some errors in transcription may have occurred.

## 2024-04-22 ENCOUNTER — PATIENT MESSAGE (OUTPATIENT)
Dept: FAMILY MEDICINE CLINIC | Age: 64
End: 2024-04-22

## 2024-04-22 ENCOUNTER — TELEPHONE (OUTPATIENT)
Dept: FAMILY MEDICINE CLINIC | Age: 64
End: 2024-04-22

## 2024-04-22 DIAGNOSIS — E61.1 IRON DEFICIENCY: Primary | ICD-10-CM

## 2024-04-22 NOTE — TELEPHONE ENCOUNTER
Pt called & wanting Danna to call her back asap she has concerning questings about her medication.      Smita's callback # [phone]      Please advise...

## 2024-04-22 NOTE — TELEPHONE ENCOUNTER
----- Message from Alejandro Abebe MD sent at 4/22/2024 12:29 PM EDT -----  Would not be too concerned about the iron levels until we see how she responds within the next month.

## 2024-05-22 ENCOUNTER — OFFICE VISIT (OUTPATIENT)
Dept: DERMATOLOGY | Age: 64
End: 2024-05-22
Payer: COMMERCIAL

## 2024-05-22 DIAGNOSIS — L57.0 AK (ACTINIC KERATOSIS): ICD-10-CM

## 2024-05-22 DIAGNOSIS — L66.9 ALOPECIA, SCARRING: Primary | ICD-10-CM

## 2024-05-22 DIAGNOSIS — Z85.828 HISTORY OF BASAL CELL CANCER: ICD-10-CM

## 2024-05-22 DIAGNOSIS — L82.0 SEBORRHEIC KERATOSES, INFLAMED: ICD-10-CM

## 2024-05-22 PROCEDURE — 17110 DESTRUCTION B9 LES UP TO 14: CPT | Performed by: DERMATOLOGY

## 2024-05-22 PROCEDURE — 11900 INJECT SKIN LESIONS </W 7: CPT | Performed by: DERMATOLOGY

## 2024-05-22 PROCEDURE — 17000 DESTRUCT PREMALG LESION: CPT | Performed by: DERMATOLOGY

## 2024-05-22 PROCEDURE — 99214 OFFICE O/P EST MOD 30 MIN: CPT | Performed by: DERMATOLOGY

## 2024-05-22 NOTE — PROGRESS NOTES
[facility]  Beulah Hagan M.D.  [phone]       Smita Drew  1960    63 y.o. female     Date of Visit: 5/22/2024    Chief Complaint:   Chief Complaint   Patient presents with    Skin Lesion        I was asked to see this patient by Dr. Venegas ref. provider found.    History of Present Illness:  1.  Follow-up-    Scarring alopecia vertex and anterior scalp had been treated previously with sequential intralesional Kenalog 5 mg/mL, was doing well but then progressed.  Rebiopsy 4/24 again confirmed diagnosis of scarring alopecia, likely lichen plano pilaris.  Smaller papules along part line anteriorly improved-1 resolved fully and 1 improved with most recent intralesional Kenalog.  Still has involvement at larger area vertex of scalp    Upcoming Mohs for basal cell carcinoma right cheek    Dry papule left cheek.  Not itching, bleeding.  Asymptomatic    Irritated seborrheic keratosis right thigh-patient excoriating.  Has increased in size      Watches 6 grandchildren every day during the summer, taking care of parents in her 90s-dementia     Skin history:  History of basal cell carcinoma left chest and right forearm      12/16 Cutaneous sarcoid with no evidence of systemic involvement. Developed at site of IV. Tissue cultures negative.  5/17 superficial basal cell carcinoma central chest status post curettage  3/20 superficial basal cell carcinoma left medial lower leg  3/20 superficial basal cell carcinoma right medial lower leg  3/20 superficial basal cell carcinoma glabella status post Mohs 6/1/2020 (COVID)  5/20 left thenar forearm superficial basal cell carcinoma status post curettage  10/22Left vertex of scalp scarring alopecia consistent with lichen plano pilaris  3/24 upper spinal back dysplastic nevus, mild  4/24 vertex of scalp lichen plano pilaris/end-stage scarring alopecia      Seborrheic dermatitis-triamcinolone 0.025% cream or Elidel 1% cream     Rosacea-MetroGel 0.75%     Family

## 2024-05-30 DIAGNOSIS — E61.1 IRON DEFICIENCY: Primary | ICD-10-CM

## 2024-06-26 ENCOUNTER — OFFICE VISIT (OUTPATIENT)
Dept: DERMATOLOGY | Age: 64
End: 2024-06-26
Payer: COMMERCIAL

## 2024-06-26 DIAGNOSIS — L57.8 DIFFUSE PHOTODAMAGE OF SKIN: ICD-10-CM

## 2024-06-26 DIAGNOSIS — C44.319 BASAL CELL CARCINOMA (BCC) OF RIGHT CHEEK: ICD-10-CM

## 2024-06-26 DIAGNOSIS — L66.9 ALOPECIA, SCARRING: Primary | ICD-10-CM

## 2024-06-26 DIAGNOSIS — Z85.828 HISTORY OF BASAL CELL CANCER: ICD-10-CM

## 2024-06-26 PROCEDURE — 99214 OFFICE O/P EST MOD 30 MIN: CPT | Performed by: DERMATOLOGY

## 2024-06-26 PROCEDURE — 11900 INJECT SKIN LESIONS </W 7: CPT | Performed by: DERMATOLOGY

## 2024-06-26 NOTE — PROGRESS NOTES
[facility]  Beulah Hagan M.D.  [phone]       Smita Drew  1960    63 y.o. female     Date of Visit: 6/26/2024    Chief Complaint:   Chief Complaint   Patient presents with    Skin Lesion        I was asked to see this patient by Dr. Venegas ref. provider found.    History of Present Illness:  1.  Patient presents today for follow-up of lichen plano pilaris-did have some improvement with increased dose of Kenalog.  Smaller lesion resolved, still has 1 smaller papule along her part line and larger area of involvement left vertex of scalp with activity at margin.    Photodamage-progressive freckling and lentigines.  Inquiring about treatment options.    Basal cell carcinoma right inferior cheek.  Mohs scheduled next month.    Watches 6 grandchildren every day during the summer, taking care of parents in her 90s-dementia     Skin history:  History of basal cell carcinoma left chest and right forearm      12/16 Cutaneous sarcoid with no evidence of systemic involvement. Developed at site of IV. Tissue cultures negative.  5/17 superficial basal cell carcinoma central chest status post curettage  3/20 superficial basal cell carcinoma left medial lower leg  3/20 superficial basal cell carcinoma right medial lower leg  3/20 superficial basal cell carcinoma glabella status post Mohs 6/1/2020 (COVID)  5/20 left thenar forearm superficial basal cell carcinoma status post curettage  10/22Left vertex of scalp scarring alopecia consistent with lichen plano pilaris  3/24 upper spinal back dysplastic nevus, mild  4/24 vertex of scalp lichen plano pilaris/end-stage scarring alopecia  4/24 right inferior cheek superficial basal cell carcinoma-Mohs scheduled      Seborrheic dermatitis-triamcinolone 0.025% cream or Elidel 1% cream     Rosacea-MetroGel 0.75%     Family history positive for nonmelanoma skin cancer in both parents    Review of Systems:  Constitutional: Reports general sense of well-being       Past

## 2024-07-13 DIAGNOSIS — E78.00 HYPERCHOLESTEREMIA: ICD-10-CM

## 2024-07-15 RX ORDER — POTASSIUM CHLORIDE 1500 MG/1
20 TABLET, EXTENDED RELEASE ORAL DAILY
Qty: 90 TABLET | Refills: 1 | Status: SHIPPED | OUTPATIENT
Start: 2024-07-15

## 2024-07-15 RX ORDER — ATORVASTATIN CALCIUM 20 MG/1
TABLET, FILM COATED ORAL
Qty: 90 TABLET | Refills: 1 | Status: SHIPPED | OUTPATIENT
Start: 2024-07-15

## 2024-07-15 NOTE — TELEPHONE ENCOUNTER
Requested Prescriptions     Pending Prescriptions Disp Refills    potassium chloride (K-TAB) 20 MEQ TBCR extended release tablet [Pharmacy Med Name: POTASSIUM CHLORIDE 20MEQ ER TABLETS] 90 tablet 1     Sig: TAKE 1 TABLET BY MOUTH DAILY     Last OV 4/16/24  Next OV - 10/21/24  Last refill - 1/15/24  Last labs - 6/29/24

## 2024-07-15 NOTE — TELEPHONE ENCOUNTER
Requested Prescriptions     Pending Prescriptions Disp Refills    atorvastatin (LIPITOR) 20 MG tablet [Pharmacy Med Name: Atorvastatin Calcium Oral Tablet 20 MG] 90 tablet 0     Sig: TAKE 1 TABLET BY MOUTH EVERY DAY     Last OV - 4/16/24  Next OV - 10/21/24  Last refill - 4/12/24  Last labs - 6/29/24

## 2024-07-22 ENCOUNTER — PROCEDURE VISIT (OUTPATIENT)
Dept: SURGERY | Age: 64
End: 2024-07-22
Payer: COMMERCIAL

## 2024-07-22 VITALS — DIASTOLIC BLOOD PRESSURE: 79 MMHG | SYSTOLIC BLOOD PRESSURE: 133 MMHG | HEART RATE: 70 BPM

## 2024-07-22 DIAGNOSIS — C44.319 BASAL CELL CARCINOMA OF RIGHT CHEEK: Primary | ICD-10-CM

## 2024-07-22 PROCEDURE — 17312 MOHS ADDL STAGE: CPT | Performed by: DERMATOLOGY

## 2024-07-22 PROCEDURE — 17311 MOHS 1 STAGE H/N/HF/G: CPT | Performed by: DERMATOLOGY

## 2024-07-22 PROCEDURE — 12052 INTMD RPR FACE/MM 2.6-5.0 CM: CPT | Performed by: DERMATOLOGY

## 2024-07-22 NOTE — PROGRESS NOTES
MOHS PROCEDURE NOTE    PHYSICIAN:  Trinh Mohamud MD, Who operated in two distinct and integrated capacities as the surgeon removing the tissue and as the pathologist examining the tissue.    ASSISTANT: Ashlyn Ashford RN, Cain Hay RN     REFERRING PROVIDER:  Beulah Hagan MD    PREOPERATIVE DIAGNOSIS: Superficial Basal Cell Carcinoma     SPECIFIC MOHS INDICATIONS:  location, clinically ill-defined borders, and need for tissue conservation    AUC SCORIN/9    POSTOPERATIVE DIAGNOSIS: SAME    LOCATION: Right inferior cheek    OPERATIVE PROCEDURE:  MOHS MICROGRAPHIC SURGERY    RECONSTRUCTION OF DEFECT: Intermediate layered closure    PREOPERATIVE SIZE: 5x3 MM    DEFECT SIZE: 14x10 MM    LENGTH OF REPAIRED WOUND/SIZE OF FLAP/SIZE OF GRAFT:  35 MM    ANESTHESIA:  11mL 1% lidocaine with epinephrine 1:100,000 buffered.     EBL:  MINIMAL    DURATION OF PROCEDURE:  2 HOURS    POSTOPERATIVE OBSERVATION: 1 HOUR    SPECIMENS:  SEE MOHS MAP    COMPLICATIONS:  NONE    DESCRIPTION OF PROCEDURE:  The patient was given a mirror, as appropriate, and the biopsy site was identified, marked with a surgical marking pen, and verified by the patient.   Options for treatment were discussed and the patient was informed that Mohs surgery was the selected treatment based on its lower recurrence rate, given the features listed above, as compared to other treatment modalities such as excision, radiation, or curettage, and agreed with this treatment plan.  Risks and benefits including bruising, swelling, bleeding, infection, nerve injury, recurrence, and scarring were discussed with the patient prior to the procedure and a written consent detailing these and other risks was reviewed with the patient and signed.    There was a time out for person and procedure verification.  The surgical site was prepped with an antiseptic solution.  Application of an antiseptic solution was repeated before each surgical stage.      Stage I:

## 2024-07-22 NOTE — PROGRESS NOTES
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

## 2024-07-22 NOTE — PATIENT INSTRUCTIONS
[facility] Office Hours:    Monday-Thursday  7:30 AM-4:30 PM    Friday    9:00 AM-1:00 PM     POST-OPERATIVE CARE FOR LIQUID SKIN ADHESIVE             Bandage change after 24 hours    During your procedure today, a liquid skin adhesive was used to close the wound. You do not have to have stiches removed. If has a clear to light purple shiny surface. You do not have to have this removed. It will dissolve (melt away) in about 1-2 weeks. Please follow these instructions to help you recover from your procedure and help your wound heal.    CARING FOR YOUR SURGICAL SITE  The bandage should remain on and completely dry for 24 hours. Do NOT get the bandage wet.  After the first 24 hours, gently remove the remaining part of the bandage. It can be helpful to moisten the bandage edges in the shower.   Be gentle around the area of the wound. Do not scrub, rub or pick at the skin glue. It will gradually dissolve in 1-2 weeks.   Do not shave directly over the wound for one week. You can shave around the area.   After one week you can start cleaning the area gently and resume all normal activity. No further restrictions.  Use Sunscreen with SPF of at least 30 on the area around the wound.    If the dressing comes off or if you have questions, or concerns about the dressing, please call the office for instructions!    POST OPERATIVE INSTRUCTIONS    Activity: it is recommended to avoid strenuous activity such as lifting, pushing, pulling, running, power walking or contact sports for at least 2-7 days or as recommended by your provider.  Eating and drinking: Do not drink alcohol for 48 hours after your procedure. Alcohol increases the chances of bleeding.  Medicines   -If you have discomfort, take Acetaminophen (Tylenol or Extra Strength Tylenol). Follow the instructions and warning on the bottle.    Bleeding: If bleeding occurs, Put firm pressure on the area with gauze for 20 minutes without peeking. If the

## 2024-07-23 ENCOUNTER — TELEPHONE (OUTPATIENT)
Dept: SURGERY | Age: 64
End: 2024-07-23

## 2024-07-23 NOTE — TELEPHONE ENCOUNTER
The patient was in the office on 7/23/2024 for mohs located on the right inferior cheek with ILC repair.  The patient tolerated the procedure well and left the office in good condition.    Pain level on post-operative day 1:  none    Any bleeding episode that required pressure to be held, bandage change or a call to the office or MD?  no     Any other issues?:  no    A post-operative telephone call was placed at 12:31pm in order to check on the patient's recovery process.  The patient reported doing well and had no complaints other than those listed above, if any.  All of the patient's questions were answered.

## 2024-07-30 ENCOUNTER — OFFICE VISIT (OUTPATIENT)
Dept: SURGERY | Age: 64
End: 2024-07-30
Payer: COMMERCIAL

## 2024-07-30 VITALS — DIASTOLIC BLOOD PRESSURE: 62 MMHG | BODY MASS INDEX: 25.15 KG/M2 | SYSTOLIC BLOOD PRESSURE: 133 MMHG | WEIGHT: 142 LBS

## 2024-07-30 DIAGNOSIS — C18.2 CANCER OF ASCENDING COLON (HCC): Primary | ICD-10-CM

## 2024-07-30 PROCEDURE — 99213 OFFICE O/P EST LOW 20 MIN: CPT | Performed by: SURGERY

## 2024-07-30 PROCEDURE — 1123F ACP DISCUSS/DSCN MKR DOCD: CPT | Performed by: SURGERY

## 2024-07-30 PROCEDURE — 3075F SYST BP GE 130 - 139MM HG: CPT | Performed by: SURGERY

## 2024-07-30 PROCEDURE — 3078F DIAST BP <80 MM HG: CPT | Performed by: SURGERY

## 2024-07-30 NOTE — PROGRESS NOTES
[facility] GENERAL AND LAPAROSCOPIC SURGERY          PATIENT NAME: Smita Drew     TODAY'S DATE: 7/30/2024    CC: colon cancer     SUBJECTIVE:    Pt feeling well, no concerns. Pt has had normal po, BM, no bleeding, no scotty. No CP or SOB.  Active after bilat TKR. Feels abd is ok. Weight stable.      OBJECTIVE:  VITALS:  /62   Wt 64.4 kg (142 lb)   BMI 25.15 kg/m²     CONSTITUTIONAL:  awake and alert  NECK: no mass or adenopathy  LUNGS:  clear to auscultation  HEART: RRR  ABDOMEN:  normal bowel sounds, soft, non-distended, non-tender, prior incisions healed, no mass or hernia  LYMPH: groin nodes normal     Data:    PATHOLOGY 3/2016  FINAL DIAGNOSIS:     Right colectomy:   - Moderately differentiated adenocarcinoma of ascending colon with   invasion into pericolic adipose tissue.   - Mesenteric lymph nodes (22), negative for malignancy.   - Appendix, no pathologic diagnosis.   - Proximal, distal, and radial resection margins, negative for   malignancy.     ASSESSMENT AND PLAN:  S/P Lap Right colectomy  201.  No colon cancer recurrence  Last colonoscopy ok, FU with Dr. Carrasquillo in a few years  See Med Onc next month  See me in a year     Garfield Bashir MD

## 2024-09-04 ENCOUNTER — OFFICE VISIT (OUTPATIENT)
Dept: DERMATOLOGY | Age: 64
End: 2024-09-04
Payer: COMMERCIAL

## 2024-09-04 DIAGNOSIS — L66.9 ALOPECIA, SCARRING: ICD-10-CM

## 2024-09-04 DIAGNOSIS — D22.9 BENIGN NEVUS: ICD-10-CM

## 2024-09-04 DIAGNOSIS — Z85.828 HISTORY OF BASAL CELL CANCER: ICD-10-CM

## 2024-09-04 DIAGNOSIS — C44.712 BASAL CELL CARCINOMA OF RIGHT THIGH: ICD-10-CM

## 2024-09-04 DIAGNOSIS — L57.0 ACTINIC KERATOSIS TREATED WITH TOPICAL FLUOROURACIL (5FU): Primary | ICD-10-CM

## 2024-09-04 DIAGNOSIS — L57.8 DIFFUSE PHOTODAMAGE OF SKIN: ICD-10-CM

## 2024-09-04 PROCEDURE — 17261 DSTRJ MAL LES T/A/L .6-1.0CM: CPT | Performed by: DERMATOLOGY

## 2024-09-04 PROCEDURE — 99214 OFFICE O/P EST MOD 30 MIN: CPT | Performed by: DERMATOLOGY

## 2024-09-04 PROCEDURE — 11900 INJECT SKIN LESIONS </W 7: CPT | Performed by: DERMATOLOGY

## 2024-09-04 PROCEDURE — 1123F ACP DISCUSS/DSCN MKR DOCD: CPT | Performed by: DERMATOLOGY

## 2024-09-04 RX ORDER — FLUOROURACIL 50 MG/G
CREAM TOPICAL
Qty: 40 G | Refills: 0 | Status: SHIPPED | OUTPATIENT
Start: 2024-09-04

## 2024-09-09 LAB — DERMATOLOGY PATHOLOGY REPORT: ABNORMAL

## 2024-09-12 ENCOUNTER — INITIAL CONSULT (OUTPATIENT)
Dept: SURGERY | Age: 64
End: 2024-09-12

## 2024-09-12 ENCOUNTER — PATIENT MESSAGE (OUTPATIENT)
Dept: FAMILY MEDICINE CLINIC | Age: 64
End: 2024-09-12

## 2024-09-12 VITALS — WEIGHT: 146 LBS | DIASTOLIC BLOOD PRESSURE: 70 MMHG | SYSTOLIC BLOOD PRESSURE: 119 MMHG | BODY MASS INDEX: 25.86 KG/M2

## 2024-09-12 DIAGNOSIS — R20.9 DISTURBANCE OF SKIN SENSATION: ICD-10-CM

## 2024-09-12 DIAGNOSIS — R42 VERTIGO: ICD-10-CM

## 2024-09-12 DIAGNOSIS — D50.9 IRON DEFICIENCY ANEMIA, UNSPECIFIED IRON DEFICIENCY ANEMIA TYPE: ICD-10-CM

## 2024-09-12 DIAGNOSIS — E61.1 IRON DEFICIENCY: ICD-10-CM

## 2024-09-12 DIAGNOSIS — R35.0 URINARY FREQUENCY: Primary | ICD-10-CM

## 2024-09-12 DIAGNOSIS — L72.9 SKIN CYST: Primary | ICD-10-CM

## 2024-09-12 ASSESSMENT — ENCOUNTER SYMPTOMS
RESPIRATORY NEGATIVE: 1
EYES NEGATIVE: 1
GASTROINTESTINAL NEGATIVE: 1
ALLERGIC/IMMUNOLOGIC NEGATIVE: 1

## 2024-09-24 ENCOUNTER — PROCEDURE VISIT (OUTPATIENT)
Dept: SURGERY | Age: 64
End: 2024-09-24
Payer: COMMERCIAL

## 2024-09-24 VITALS — BODY MASS INDEX: 25.86 KG/M2 | DIASTOLIC BLOOD PRESSURE: 70 MMHG | SYSTOLIC BLOOD PRESSURE: 119 MMHG | WEIGHT: 146 LBS

## 2024-09-24 DIAGNOSIS — R22.9 SKIN MASS: Primary | ICD-10-CM

## 2024-09-24 DIAGNOSIS — R20.9 DISTURBANCE OF SKIN SENSATION: ICD-10-CM

## 2024-09-24 DIAGNOSIS — L72.9 SKIN CYST: ICD-10-CM

## 2024-09-24 DIAGNOSIS — D17.24 LIPOMA OF LEFT THIGH: ICD-10-CM

## 2024-09-24 PROCEDURE — 27337 EXC THIGH/KNEE LES SC 3 CM/>: CPT | Performed by: SURGERY

## 2024-09-24 RX ORDER — LIDOCAINE HYDROCHLORIDE AND EPINEPHRINE 10; 10 MG/ML; UG/ML
10 INJECTION, SOLUTION INFILTRATION; PERINEURAL ONCE
Status: COMPLETED | OUTPATIENT
Start: 2024-09-24 | End: 2024-09-24

## 2024-09-24 RX ADMIN — LIDOCAINE HYDROCHLORIDE AND EPINEPHRINE 10 ML: 10; 10 INJECTION, SOLUTION INFILTRATION; PERINEURAL at 11:00

## 2024-09-30 ENCOUNTER — HOSPITAL ENCOUNTER (OUTPATIENT)
Dept: MAMMOGRAPHY | Age: 64
Discharge: HOME | End: 2024-09-30
Payer: COMMERCIAL

## 2024-09-30 VITALS — BODY MASS INDEX: 24.8 KG/M2 | WEIGHT: 140 LBS | HEIGHT: 63 IN

## 2024-09-30 DIAGNOSIS — Z12.31 VISIT FOR SCREENING MAMMOGRAM: ICD-10-CM

## 2024-09-30 PROCEDURE — 77063 BREAST TOMOSYNTHESIS BI: CPT

## 2024-10-08 ENCOUNTER — HOSPITAL ENCOUNTER (OUTPATIENT)
Dept: CT IMAGING | Age: 64
Discharge: HOME | End: 2024-10-08
Attending: STUDENT IN AN ORGANIZED HEALTH CARE EDUCATION/TRAINING PROGRAM
Payer: COMMERCIAL

## 2024-10-08 DIAGNOSIS — C18.2 CANCER OF ASCENDING COLON (HCC): ICD-10-CM

## 2024-10-08 PROCEDURE — 6360000004 HC RX CONTRAST MEDICATION: Performed by: STUDENT IN AN ORGANIZED HEALTH CARE EDUCATION/TRAINING PROGRAM

## 2024-10-08 PROCEDURE — 74177 CT ABD & PELVIS W/CONTRAST: CPT

## 2024-10-08 PROCEDURE — 2500000003 HC RX 250 WO HCPCS: Performed by: STUDENT IN AN ORGANIZED HEALTH CARE EDUCATION/TRAINING PROGRAM

## 2024-10-08 RX ORDER — IOPAMIDOL 755 MG/ML
75 INJECTION, SOLUTION INTRAVASCULAR
Status: COMPLETED | OUTPATIENT
Start: 2024-10-08 | End: 2024-10-08

## 2024-10-08 RX ADMIN — BARIUM SULFATE 900 ML: 20 SUSPENSION ORAL at 08:05

## 2024-10-08 RX ADMIN — IOPAMIDOL 75 ML: 755 INJECTION, SOLUTION INTRAVENOUS at 08:05

## 2024-10-10 ENCOUNTER — HOSPITAL ENCOUNTER (OUTPATIENT)
Dept: MRI IMAGING | Age: 64
Discharge: HOME | End: 2024-10-10
Attending: STUDENT IN AN ORGANIZED HEALTH CARE EDUCATION/TRAINING PROGRAM
Payer: COMMERCIAL

## 2024-10-10 ENCOUNTER — TELEPHONE (OUTPATIENT)
Dept: DERMATOLOGY | Age: 64
End: 2024-10-10

## 2024-10-10 DIAGNOSIS — M54.6 ACUTE BILATERAL THORACIC BACK PAIN: ICD-10-CM

## 2024-10-10 DIAGNOSIS — M54.50 LUMBAR BACK PAIN: ICD-10-CM

## 2024-10-10 PROCEDURE — 6360000004 HC RX CONTRAST MEDICATION: Performed by: STUDENT IN AN ORGANIZED HEALTH CARE EDUCATION/TRAINING PROGRAM

## 2024-10-10 PROCEDURE — 72158 MRI LUMBAR SPINE W/O & W/DYE: CPT

## 2024-10-10 PROCEDURE — A9579 GAD-BASE MR CONTRAST NOS,1ML: HCPCS | Performed by: STUDENT IN AN ORGANIZED HEALTH CARE EDUCATION/TRAINING PROGRAM

## 2024-10-10 PROCEDURE — 72157 MRI CHEST SPINE W/O & W/DYE: CPT

## 2024-10-10 RX ADMIN — GADOTERIDOL 13 ML: 279.3 INJECTION, SOLUTION INTRAVENOUS at 10:59

## 2024-10-10 NOTE — TELEPHONE ENCOUNTER
Pt has been using Fluorouracil since Sunday but had expected a change in the appearance of the treated areas. Face is not red, etc. as expected.  She is coming in on Tuesday 10/15 so is asking if there is anything she should do differently between now and then or if this might be normal.    [phone]

## 2024-10-10 NOTE — TELEPHONE ENCOUNTER
Called and spoke to patient explained that sometimes you see a reaction to the Fluorouracil cream after you complete treatment. Patient expressed understanding and stated she has an upcoming appointment on 10/15/24 at 9 am.

## 2024-10-15 ENCOUNTER — OFFICE VISIT (OUTPATIENT)
Dept: DERMATOLOGY | Age: 64
End: 2024-10-15
Payer: COMMERCIAL

## 2024-10-15 DIAGNOSIS — L66.9 ALOPECIA, SCARRING: ICD-10-CM

## 2024-10-15 DIAGNOSIS — L57.0 ACTINIC KERATOSIS TREATED WITH TOPICAL FLUOROURACIL (5FU): Primary | ICD-10-CM

## 2024-10-15 DIAGNOSIS — Z85.828 HISTORY OF BASAL CELL CANCER: ICD-10-CM

## 2024-10-15 PROCEDURE — 99214 OFFICE O/P EST MOD 30 MIN: CPT | Performed by: DERMATOLOGY

## 2024-10-15 PROCEDURE — 1123F ACP DISCUSS/DSCN MKR DOCD: CPT | Performed by: DERMATOLOGY

## 2024-10-15 PROCEDURE — 11900 INJECT SKIN LESIONS </W 7: CPT | Performed by: DERMATOLOGY

## 2024-10-15 NOTE — PROGRESS NOTES
Mid anterior scalp with 4 mm scale at margin, much of lesion smooth and well-healed  Forehead,.  Temples, left cheek with mild background erythema and a few scattered erythematous papules, no desquamation.      Assessment and Plan     1. Actinic keratosis treated with topical fluorouracil (5FU)-complete 14 days of fluorouracil-discussed expectations and healing.   2. Alopecia, scarring - -IL kenalog 10 mg/ml; total .4 ml to 2 lesion(s). Edu re: atrophy, dyspigmentation.  Recheck 4 to 6 weeks   3. History of basal cell cancer-total-body skin exam per usual schedule-last total-body skin exam 9/24

## 2024-10-21 ENCOUNTER — OFFICE VISIT (OUTPATIENT)
Dept: FAMILY MEDICINE CLINIC | Age: 64
End: 2024-10-21
Payer: COMMERCIAL

## 2024-10-21 VITALS
BODY MASS INDEX: 25.86 KG/M2 | TEMPERATURE: 98.6 F | HEART RATE: 65 BPM | DIASTOLIC BLOOD PRESSURE: 70 MMHG | WEIGHT: 146 LBS | SYSTOLIC BLOOD PRESSURE: 124 MMHG | OXYGEN SATURATION: 99 % | RESPIRATION RATE: 12 BRPM

## 2024-10-21 DIAGNOSIS — Z23 NEED FOR VACCINATION: ICD-10-CM

## 2024-10-21 DIAGNOSIS — E78.00 HYPERCHOLESTEREMIA: ICD-10-CM

## 2024-10-21 DIAGNOSIS — I10 ESSENTIAL HYPERTENSION: Primary | ICD-10-CM

## 2024-10-21 DIAGNOSIS — K21.9 GASTROESOPHAGEAL REFLUX DISEASE WITHOUT ESOPHAGITIS: ICD-10-CM

## 2024-10-21 DIAGNOSIS — D50.9 IRON DEFICIENCY ANEMIA, UNSPECIFIED IRON DEFICIENCY ANEMIA TYPE: ICD-10-CM

## 2024-10-21 PROCEDURE — 90471 IMMUNIZATION ADMIN: CPT | Performed by: FAMILY MEDICINE

## 2024-10-21 PROCEDURE — 3074F SYST BP LT 130 MM HG: CPT | Performed by: FAMILY MEDICINE

## 2024-10-21 PROCEDURE — 3078F DIAST BP <80 MM HG: CPT | Performed by: FAMILY MEDICINE

## 2024-10-21 PROCEDURE — 90677 PCV20 VACCINE IM: CPT | Performed by: FAMILY MEDICINE

## 2024-10-21 PROCEDURE — 99214 OFFICE O/P EST MOD 30 MIN: CPT | Performed by: FAMILY MEDICINE

## 2024-10-21 PROCEDURE — 1123F ACP DISCUSS/DSCN MKR DOCD: CPT | Performed by: FAMILY MEDICINE

## 2024-10-21 NOTE — PROGRESS NOTES
Subjective   Patient ID: Smita Drew is a 64 y.o. female.  CC: Patient presents for re-evaluation of chronic health problems including hypertension, hypercholesterol, history of colon cancer, iron deficiency anemia and GERD..    HPI pt is here for a follow up.  She overall feels she is doing well at this time.  Her  are back walking long distances almost every day.  She did have an EGD and colonoscopy which demonstrated 1 polyp in the colon.  She is wonder if she should stop the iron therapy.  She continues to be very compliant with her blood pressure management.  She has been under care of dermatology for recurrent skin cancer and actinic keratosis.  She continues under care of ENT specialty.  She is now over 10 years out from colon cancer history.  With increasing GERD symptoms gastroenterologist increased omeprazole to 40 mg daily.  With that her symptoms have improved.    Review of Systems  Patient Active Problem List   Diagnosis    Essential hypertension    Goiter    Irritable bowel syndrome    Meniere's disease    Hypercholesteremia    Cancer of ascending colon (HCC)    Gastroesophageal reflux disease without esophagitis    Cutaneous sarcoidosis    Allergic conjunctivitis of both eyes    Food allergy    Hypokalemia       Outpatient Medications Marked as Taking for the 10/21/24 encounter (Office Visit) with Alejandro Abebe MD   Medication Sig Dispense Refill    potassium chloride (K-TAB) 20 MEQ TBCR extended release tablet TAKE 1 TABLET BY MOUTH DAILY 90 tablet 1    atorvastatin (LIPITOR) 20 MG tablet TAKE 1 TABLET BY MOUTH EVERY DAY 90 tablet 1    aspirin 325 MG tablet Take 1 tablet by mouth daily      triamterene-hydroCHLOROthiazide (DYAZIDE) 37.5-25 MG per capsule Take 1 capsule by mouth daily 90 capsule 3    omeprazole (PRILOSEC) 20 MG delayed release capsule Take 1 capsule by mouth Daily      olopatadine (PATADAY) 0.2 % SOLN ophthalmic solution Place 1 drop into both eyes daily

## 2024-11-20 ENCOUNTER — OFFICE VISIT (OUTPATIENT)
Dept: DERMATOLOGY | Age: 64
End: 2024-11-20
Payer: COMMERCIAL

## 2024-11-20 DIAGNOSIS — L57.0 AK (ACTINIC KERATOSIS): ICD-10-CM

## 2024-11-20 DIAGNOSIS — Z85.828 HISTORY OF BASAL CELL CANCER: ICD-10-CM

## 2024-11-20 DIAGNOSIS — D48.5 NEOPLASM OF UNCERTAIN BEHAVIOR OF SKIN: ICD-10-CM

## 2024-11-20 DIAGNOSIS — L57.0 ACTINIC KERATOSIS TREATED WITH TOPICAL FLUOROURACIL (5FU): Primary | ICD-10-CM

## 2024-11-20 DIAGNOSIS — L66.9 ALOPECIA, SCARRING: ICD-10-CM

## 2024-11-20 PROCEDURE — 17000 DESTRUCT PREMALG LESION: CPT | Performed by: DERMATOLOGY

## 2024-11-20 PROCEDURE — 11102 TANGNTL BX SKIN SINGLE LES: CPT | Performed by: DERMATOLOGY

## 2024-11-20 PROCEDURE — 99214 OFFICE O/P EST MOD 30 MIN: CPT | Performed by: DERMATOLOGY

## 2024-11-20 PROCEDURE — 1123F ACP DISCUSS/DSCN MKR DOCD: CPT | Performed by: DERMATOLOGY

## 2024-11-20 PROCEDURE — 17003 DESTRUCT PREMALG LES 2-14: CPT | Performed by: DERMATOLOGY

## 2024-11-20 PROCEDURE — 11900 INJECT SKIN LESIONS </W 7: CPT | Performed by: DERMATOLOGY

## 2024-11-20 NOTE — PROGRESS NOTES
Well-appearing, NAD  1.  Limited exam.  Postinflammatory erythema at site of prior fluorouracil.  A few scattered gritty erythematous papules face.  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  5 mm crusted papule left anterior vertex of scalp-AK rule out squamous cell carcinoma/early lichen plano pilaris                Assessment and Plan     1. Actinic keratosis treated with topical fluorouracil (5FU)-good improvement, remain off fluorouracil   2. AK (actinic keratosis) -4, face after the risks, complications, and alternatives were explained to the patient, including risk of blister formation, discomfort, scar, hypopigmentation, patient elected to proceed with cryotherapy. Lesion(s) were treated w/ liquid nitrogen using a Cryogun. 1 freeze thaw cycle was performed with a freeze time of 1-5 seconds.  There were no immediate complications.  Wound care instructions were discussed with the patient.     3. Neoplasm of uncertain behavior of skin-left anterior vertex of scalp--Discussed possible diagnosis. Patient agreeable to biopsy. Verbal consent obtained after risks (infection, bleeding, scar), benefits and alternatives explained.   -Area(s) to be biopsied were marked with a surgical pen. Site(s) were cleansed with alcohol. Local anesthesia achieved with 1% lidocaine with epinephrine/sodium bicarbonate. Shave biopsy performed with a razor blade. Hemostasis was achieved with aluminum chloride. The wound(s) were dressed with petrolatum and covered with a bandage. Specimen(s) sent to pathology. Pt educated re: risk of bleeding, infection, scar and wound care instructions.     4. Alopecia, scarring - -IL kenalog 10 mg/ml; total .4 ml to 2 lesion(s). Edu re: atrophy, dyspigmentation.    5. History of basal cell cancer per usual schedule-last exam 9/24

## 2024-11-25 LAB — DERMATOLOGY PATHOLOGY REPORT: NORMAL

## 2024-12-19 ENCOUNTER — OFFICE VISIT (OUTPATIENT)
Age: 64
End: 2024-12-19

## 2024-12-19 DIAGNOSIS — L57.0 AK (ACTINIC KERATOSIS): Primary | ICD-10-CM

## 2024-12-19 DIAGNOSIS — L66.9 ALOPECIA, SCARRING: ICD-10-CM

## 2024-12-19 NOTE — PROGRESS NOTES
[facility]ermat[facility]  Beulah Hagan M.D.  [phone]       Smita Drew  1960    64 y.o. female     Date of Visit: 12/19/2024    Chief Complaint:   Chief Complaint   Patient presents with    Skin Lesion        I was asked to see this patient by Dr. Venegas ref. provider found.    History of Present Illness:  1. Bx + AK scalp- healed well    LPP- much improved with increase in ILK to 10 mg/cc. Still w 2 focal areas.      Watches 6 grandchildren every day during the summer, taking care of parents in her 90s-dementia     Skin history:  History of basal cell carcinoma left chest and right forearm      12/16 Cutaneous sarcoid with no evidence of systemic involvement. Developed at site of IV. Tissue cultures negative.  5/17 superficial basal cell carcinoma central chest status post curettage  3/20 superficial basal cell carcinoma left medial lower leg  3/20 superficial basal cell carcinoma right medial lower leg  3/20 superficial basal cell carcinoma glabella status post Mohs 6/1/2020 (COVID)  5/20 left thenar forearm superficial basal cell carcinoma status post curettage  10/22Left vertex of scalp scarring alopecia consistent with lichen plano pilaris  3/24 upper spinal back dysplastic nevus, mild  4/24 vertex of scalp lichen plano pilaris/end-stage scarring alopecia  4/24 right inferior cheek superficial basal cell carcinoma-Mohs scheduled      Seborrheic dermatitis-triamcinolone 0.025% cream or Elidel 1% cream     Rosacea-MetroGel 0.75%     Family history positive for nonmelanoma skin cancer in both parents       Review of Systems:  Constitutional: Reports general sense of well-being       Past Medical History, Surgical History, Family History, Medications and Allergies reviewed.    Social History:   Social History     Socioeconomic History    Marital status:      Spouse name: Not on file    Number of children: Not on file    Years of education: Not on file    Highest education level: Not on file

## 2025-01-09 RX ORDER — POTASSIUM CHLORIDE 1500 MG/1
20 TABLET, EXTENDED RELEASE ORAL DAILY
Qty: 90 TABLET | Refills: 1 | Status: SHIPPED | OUTPATIENT
Start: 2025-01-09

## 2025-01-11 DIAGNOSIS — E78.00 HYPERCHOLESTEREMIA: ICD-10-CM

## 2025-01-13 RX ORDER — ATORVASTATIN CALCIUM 20 MG/1
TABLET, FILM COATED ORAL
Qty: 90 TABLET | Refills: 0 | Status: SHIPPED | OUTPATIENT
Start: 2025-01-13

## 2025-02-03 ENCOUNTER — OFFICE VISIT (OUTPATIENT)
Age: 65
End: 2025-02-03
Payer: MEDICARE

## 2025-02-03 DIAGNOSIS — L57.0 AK (ACTINIC KERATOSIS): ICD-10-CM

## 2025-02-03 DIAGNOSIS — Z85.828 HISTORY OF BASAL CELL CANCER: ICD-10-CM

## 2025-02-03 DIAGNOSIS — L66.9 ALOPECIA, SCARRING: ICD-10-CM

## 2025-02-03 DIAGNOSIS — D48.5 NEOPLASM OF UNCERTAIN BEHAVIOR OF SKIN: Primary | ICD-10-CM

## 2025-02-03 PROCEDURE — 11102 TANGNTL BX SKIN SINGLE LES: CPT | Performed by: DERMATOLOGY

## 2025-02-03 PROCEDURE — 11900 INJECT SKIN LESIONS </W 7: CPT | Performed by: DERMATOLOGY

## 2025-02-03 PROCEDURE — 11103 TANGNTL BX SKIN EA SEP/ADDL: CPT | Performed by: DERMATOLOGY

## 2025-02-03 PROCEDURE — 17003 DESTRUCT PREMALG LES 2-14: CPT | Performed by: DERMATOLOGY

## 2025-02-03 PROCEDURE — 17000 DESTRUCT PREMALG LESION: CPT | Performed by: DERMATOLOGY

## 2025-02-03 RX ADMIN — TRIAMCINOLONE ACETONIDE 3 MG: 10 INJECTION, SUSPENSION INTRA-ARTICULAR; INTRALESIONAL at 09:31

## 2025-02-03 NOTE — PROGRESS NOTES
[facility]ermat[facility]  Beulah Hagan M.D.  [phone]       Smita Drew  1960    64 y.o. female     Date of Visit: 2/3/2025    Chief Complaint:   Chief Complaint   Patient presents with    Lesion(s)     scalp        I was asked to see this patient by Dr. Venegas ref. provider found.    History of Present Illness:  1.   Patient presents today for [id number] of scarring alopecia left vertex of scalp, much improved with increase in intralesional Kenalog strength to 10 mg/cc.    Left anterior vertex previously biopsied actinic keratosis with underlying scar 11/24.  Previously treated with liquid nitrogen, modest improvement if any.  May have associated scarring alopecia as well    2 new lesions-left lateral canthus, right mandible.  New in the last 1 to 2 months.  Not itching, bleeding.      Watches 6 grandchildren every day during the summer, taking care of parents in her 90s-dementia     Skin history:  History of basal cell carcinoma left chest and right forearm      12/16 Cutaneous sarcoid with no evidence of systemic involvement. Developed at site of IV. Tissue cultures negative.  5/17 superficial basal cell carcinoma central chest status post curettage  3/20 superficial basal cell carcinoma left medial lower leg  3/20 superficial basal cell carcinoma right medial lower leg  3/20 superficial basal cell carcinoma glabella status post Mohs 6/1/2020 (COVID)  5/20 left thenar forearm superficial basal cell carcinoma status post curettage  10/22Left vertex of scalp scarring alopecia consistent with lichen plano pilaris  3/24 upper spinal back dysplastic nevus, mild  4/24 vertex of scalp lichen plano pilaris/end-stage scarring alopecia  4/24 right inferior cheek superficial basal cell carcinoma-Mohs scheduled      Seborrheic dermatitis-triamcinolone 0.025% cream or Elidel 1% cream     Rosacea-MetroGel 0.75%     Family history positive for nonmelanoma skin cancer in both parents  Review of

## 2025-02-04 ENCOUNTER — TELEPHONE (OUTPATIENT)
Age: 65
End: 2025-02-04

## 2025-02-04 NOTE — TELEPHONE ENCOUNTER
Called Sparkle at [facility] back and informed her that the biopsy is right mandible.

## 2025-02-04 NOTE — TELEPHONE ENCOUNTER
Sparkle, from [facility], calling for clarification. Bottle says SHEA murillo, requisition says JAIME murillo.     [phone] (ask for Sparkle)

## 2025-02-07 LAB — DERMATOLOGY PATHOLOGY REPORT: NORMAL

## 2025-02-17 DIAGNOSIS — E78.00 HYPERCHOLESTEREMIA: ICD-10-CM

## 2025-02-17 RX ORDER — TRIAMTERENE AND HYDROCHLOROTHIAZIDE 37.5; 25 MG/1; MG/1
1 CAPSULE ORAL DAILY
Qty: 90 CAPSULE | Refills: 0 | Status: SHIPPED | OUTPATIENT
Start: 2025-02-17

## 2025-02-17 RX ORDER — ATORVASTATIN CALCIUM 20 MG/1
TABLET, FILM COATED ORAL
Qty: 90 TABLET | Refills: 0 | Status: SHIPPED | OUTPATIENT
Start: 2025-02-17

## 2025-02-17 NOTE — TELEPHONE ENCOUNTER
Medication:   Requested Prescriptions      No prescriptions requested or ordered in this encounter          Patient Phone Number: [phone] (home)     Last appt: 10/21/2024   Next appt: Visit date not found    Last OARRS:       2/4/2016    12:51 PM   RX Monitoring   Attestation The Prescription Monitoring Report for this patient was reviewed today.   Periodic Controlled Substance Monitoring No signs of potential drug abuse or diversion identified.     PDMP Monitoring:    Last PDMP [place] as Reviewed ([place]):  Review User Review Instant Review Result          Preferred Pharmacy:   [id number] - [place], [place] - [address] - P [phone] - F [phone]  [facility] [address] [facility] [address]  Phone: [phone] Fax: [phone]    [id number] - [place], [place] - [address] - P [phone] - F [phone] [address]  [facility] [address]  Phone: [phone] Fax: [phone]    [facility] - [place], [place] - [address] [place] [address] - P [phone] - F [phone]  [address] [place] [address]  Phone: [phone] Fax: [phone]

## 2025-02-17 NOTE — TELEPHONE ENCOUNTER
Medication:   Requested Prescriptions      No prescriptions requested or ordered in this encounter      Provider out of office.  Has NP appointment outside our office in May.  Different pharmacy than where last Rx was sent.    Patient Phone Number: [phone] (home)     Last appt: 10/21/2024   Next appt: Visit date not found    Last OARRS:       2/4/2016    12:51 PM   RX Monitoring   Attestation The Prescription Monitoring Report for this patient was reviewed today.   Periodic Controlled Substance Monitoring No signs of potential drug abuse or diversion identified.     PDMP Monitoring:    Last PDMP [place] as Reviewed ([place]):  Review User Review Instant Review Result          Preferred Pharmacy:   [id number] [place], [place] - [address] - P [phone] - F [phone]  [facility] [address]  [facility] [address]  Phone: [phone] Fax: [phone]    [facility] - [place], [place] - [address] - P [phone] - F [phone]  [address]  [facility] [address]  Phone: [phone] Fax: [phone]

## 2025-02-20 ENCOUNTER — TELEPHONE (OUTPATIENT)
Dept: FAMILY MEDICINE CLINIC | Age: 65
End: 2025-02-20

## 2025-02-20 RX ORDER — POTASSIUM CHLORIDE 1500 MG/1
20 TABLET, EXTENDED RELEASE ORAL DAILY
Qty: 90 TABLET | Refills: 0 | Status: SHIPPED | OUTPATIENT
Start: 2025-02-20

## 2025-02-20 NOTE — TELEPHONE ENCOUNTER
potassium chloride (K-TAB) 20 MEQ TBCR extended release tablet       [facility] - [place], [place] [address] - P [phone] - F [phone]

## 2025-02-25 DIAGNOSIS — E78.00 HYPERCHOLESTEREMIA: ICD-10-CM

## 2025-02-25 RX ORDER — ATORVASTATIN CALCIUM 20 MG/1
TABLET, FILM COATED ORAL
Qty: 90 TABLET | Refills: 0 | Status: SHIPPED | OUTPATIENT
Start: 2025-02-25

## 2025-02-27 ENCOUNTER — HOSPITAL ENCOUNTER (OUTPATIENT)
Age: 65
Discharge: HOME | End: 2025-02-27
Payer: MEDICARE

## 2025-02-27 ENCOUNTER — HOSPITAL ENCOUNTER (OUTPATIENT)
Dept: GENERAL RADIOLOGY | Age: 65
Discharge: HOME | End: 2025-02-27
Payer: MEDICARE

## 2025-02-27 ENCOUNTER — OFFICE VISIT (OUTPATIENT)
Dept: FAMILY MEDICINE CLINIC | Age: 65
End: 2025-02-27
Payer: MEDICARE

## 2025-02-27 VITALS
TEMPERATURE: 97.6 F | HEART RATE: 88 BPM | WEIGHT: 151.5 LBS | BODY MASS INDEX: 26.84 KG/M2 | OXYGEN SATURATION: 98 % | SYSTOLIC BLOOD PRESSURE: 122 MMHG | DIASTOLIC BLOOD PRESSURE: 72 MMHG | RESPIRATION RATE: 12 BRPM

## 2025-02-27 DIAGNOSIS — M54.41 ACUTE BILATERAL LOW BACK PAIN WITH BILATERAL SCIATICA: Primary | ICD-10-CM

## 2025-02-27 DIAGNOSIS — M54.42 ACUTE BILATERAL LOW BACK PAIN WITH BILATERAL SCIATICA: Primary | ICD-10-CM

## 2025-02-27 DIAGNOSIS — M54.42 ACUTE BILATERAL LOW BACK PAIN WITH BILATERAL SCIATICA: ICD-10-CM

## 2025-02-27 DIAGNOSIS — M54.41 ACUTE BILATERAL LOW BACK PAIN WITH BILATERAL SCIATICA: ICD-10-CM

## 2025-02-27 PROCEDURE — 72220 X-RAY EXAM SACRUM TAILBONE: CPT

## 2025-02-27 PROCEDURE — 1090F PRES/ABSN URINE INCON ASSESS: CPT | Performed by: NURSE PRACTITIONER

## 2025-02-27 PROCEDURE — 3078F DIAST BP <80 MM HG: CPT | Performed by: NURSE PRACTITIONER

## 2025-02-27 PROCEDURE — 99214 OFFICE O/P EST MOD 30 MIN: CPT | Performed by: NURSE PRACTITIONER

## 2025-02-27 PROCEDURE — G8419 CALC BMI OUT NRM PARAM NOF/U: HCPCS | Performed by: NURSE PRACTITIONER

## 2025-02-27 PROCEDURE — 1123F ACP DISCUSS/DSCN MKR DOCD: CPT | Performed by: NURSE PRACTITIONER

## 2025-02-27 PROCEDURE — 1036F TOBACCO NON-USER: CPT | Performed by: NURSE PRACTITIONER

## 2025-02-27 PROCEDURE — G8427 DOCREV CUR MEDS BY ELIG CLIN: HCPCS | Performed by: NURSE PRACTITIONER

## 2025-02-27 PROCEDURE — G8399 PT W/DXA RESULTS DOCUMENT: HCPCS | Performed by: NURSE PRACTITIONER

## 2025-02-27 PROCEDURE — 72110 X-RAY EXAM L-2 SPINE 4/>VWS: CPT

## 2025-02-27 PROCEDURE — 3074F SYST BP LT 130 MM HG: CPT | Performed by: NURSE PRACTITIONER

## 2025-02-27 PROCEDURE — 3017F COLORECTAL CA SCREEN DOC REV: CPT | Performed by: NURSE PRACTITIONER

## 2025-02-27 PROCEDURE — G2211 COMPLEX E/M VISIT ADD ON: HCPCS | Performed by: NURSE PRACTITIONER

## 2025-02-27 RX ORDER — TRAMADOL HYDROCHLORIDE 50 MG/1
50 TABLET ORAL EVERY 4 HOURS PRN
Qty: 18 TABLET | Refills: 0 | Status: SHIPPED | OUTPATIENT
Start: 2025-02-27 | End: 2025-03-02

## 2025-02-27 SDOH — ECONOMIC STABILITY: FOOD INSECURITY: WITHIN THE PAST 12 MONTHS, YOU WORRIED THAT YOUR FOOD WOULD RUN OUT BEFORE YOU GOT MONEY TO BUY MORE.: NEVER TRUE

## 2025-02-27 SDOH — ECONOMIC STABILITY: FOOD INSECURITY: WITHIN THE PAST 12 MONTHS, THE FOOD YOU BOUGHT JUST DIDN'T LAST AND YOU DIDN'T HAVE MONEY TO GET MORE.: NEVER TRUE

## 2025-02-27 ASSESSMENT — PATIENT HEALTH QUESTIONNAIRE - PHQ9
SUM OF ALL RESPONSES TO PHQ QUESTIONS 1-9: 0
1. LITTLE INTEREST OR PLEASURE IN DOING THINGS: NOT AT ALL
SUM OF ALL RESPONSES TO PHQ QUESTIONS 1-9: 0
SUM OF ALL RESPONSES TO PHQ QUESTIONS 1-9: 0
2. FEELING DOWN, DEPRESSED OR HOPELESS: NOT AT ALL
SUM OF ALL RESPONSES TO PHQ QUESTIONS 1-9: 0

## 2025-02-27 NOTE — PROGRESS NOTES
Smita Drew  : 1960  Encounter date: 2025    This is a 64 y.o. female who presents with  Chief Complaint   Patient presents with    Back Pain     Pt states she is having low back pain going on for 2 weeks. Pt states she thought it was sciatica, but it getting worse, achy all the time, can't sleep. Pt has been using heat      History of present illness:    HPI   Presents to clinic today with concerns for low back pain that has been present for approximately 2 weeks - originally started with just some right sided sciatica, but it has been progressively worsened with bilateral sciatica.  When she is up and walking seems to be okay.  Standing in one position is more uncomfortable and laying down is the worst.  Rates 3 with standing, but up to an 8 with lying down.  She almost had her  take her to the ED the past two nights due to the pain. She does have a known compression fx at T12.  She does have an appointment with [facility], but not until  - didn't have any sooner appointments.     Allergies   Allergen Reactions    Ace Inhibitors      Lip swelling    Lisinopril Hives    Penicillins Hives    Bacitracin Rash    Morphine Nausea And Vomiting     Violently ill     Current Outpatient Medications   Medication Sig Dispense Refill    atorvastatin (LIPITOR) 20 MG tablet TAKE 1 TABLET BY MOUTH EVERY DAY 90 tablet 0    potassium chloride (K-TAB) 20 MEQ TBCR extended release tablet Take 1 tablet by mouth daily 90 tablet 0    triamterene-hydroCHLOROthiazide (DYAZIDE) 37.5-25 MG per capsule Take 1 capsule by mouth daily 90 capsule 0    aspirin 325 MG tablet Take 1 tablet by mouth daily      omeprazole (PRILOSEC) 40 MG delayed release capsule Take 1 capsule by mouth Daily      olopatadine (PATADAY) 0.2 % SOLN ophthalmic solution Place 1 drop into both eyes daily      loratadine (CLARITIN) 10 MG tablet Take 1 tablet by mouth daily      Multiple Vitamins-Minerals (CENTRUM CARDIO) TABS Take

## 2025-03-03 DIAGNOSIS — M54.41 ACUTE BILATERAL LOW BACK PAIN WITH BILATERAL SCIATICA: Primary | ICD-10-CM

## 2025-03-03 DIAGNOSIS — M54.42 ACUTE BILATERAL LOW BACK PAIN WITH BILATERAL SCIATICA: Primary | ICD-10-CM

## 2025-03-03 RX ORDER — PREDNISONE 20 MG/1
TABLET ORAL
Qty: 18 TABLET | Refills: 0 | Status: SHIPPED | OUTPATIENT
Start: 2025-03-03

## 2025-03-10 ASSESSMENT — ENCOUNTER SYMPTOMS
VOMITING: 0
COUGH: 0
DIARRHEA: 0
SHORTNESS OF BREATH: 0
NAUSEA: 0

## 2025-03-25 ENCOUNTER — OFFICE VISIT (OUTPATIENT)
Age: 65
End: 2025-03-25
Payer: MEDICARE

## 2025-03-25 DIAGNOSIS — D22.9 BENIGN NEVUS: ICD-10-CM

## 2025-03-25 DIAGNOSIS — D48.5 NEOPLASM OF UNCERTAIN BEHAVIOR OF SKIN: Primary | ICD-10-CM

## 2025-03-25 DIAGNOSIS — L57.0 AK (ACTINIC KERATOSIS): ICD-10-CM

## 2025-03-25 DIAGNOSIS — L66.9 ALOPECIA, SCARRING: ICD-10-CM

## 2025-03-25 DIAGNOSIS — Z85.828 HISTORY OF BASAL CELL CANCER: ICD-10-CM

## 2025-03-25 PROCEDURE — 11102 TANGNTL BX SKIN SINGLE LES: CPT | Performed by: DERMATOLOGY

## 2025-03-25 PROCEDURE — 99212 OFFICE O/P EST SF 10 MIN: CPT | Performed by: DERMATOLOGY

## 2025-03-25 PROCEDURE — 17003 DESTRUCT PREMALG LES 2-14: CPT | Performed by: DERMATOLOGY

## 2025-03-25 PROCEDURE — 11900 INJECT SKIN LESIONS </W 7: CPT | Performed by: DERMATOLOGY

## 2025-03-25 PROCEDURE — 17000 DESTRUCT PREMALG LESION: CPT | Performed by: DERMATOLOGY

## 2025-03-25 RX ADMIN — TRIAMCINOLONE ACETONIDE 3 MG: 10 INJECTION, SUSPENSION INTRA-ARTICULAR; INTRALESIONAL at 12:11

## 2025-03-25 NOTE — PROGRESS NOTES
[facility]ermat[facility]  Beulah Hagan M.D.  [phone]       Smita Drew  1960    64 y.o. female     Date of Visit: 3/25/2025    Chief Complaint:   Chief Complaint   Patient presents with    Skin Lesion        I was asked to see this patient by Dr. Venegas ref. provider found.    History of Present Illness:  1. TBSE    Multiple nevi. Patient has not noticed any new or changing pigmented lesions.   Stable in size, shape, color. Not itching, bleeding.     Actinic keratoses-face, right dorsal third finger, mid anterior scalp.  Dry but not itching, bleeding.  Asymptomatic    Scarring alopecia-lichen plano pilaris.  Improved since increasing intralesional Kenalog to 10 mg/cc.  No longer having scale at margin of larger lesion on vertex of scalp.    Nevus left mid upper arm.  Has remained persistently erythematous.  Not itching, bleeding.  Asymptomatic        Watches 6 grandchildren every day during the summer, taking care of parents in her 90s-dementia     Skin history:  History of basal cell carcinoma left chest and right forearm      12/16 Cutaneous sarcoid with no evidence of systemic involvement. Developed at site of IV. Tissue cultures negative.  5/17 superficial basal cell carcinoma central chest status post curettage  3/20 superficial basal cell carcinoma left medial lower leg  3/20 superficial basal cell carcinoma right medial lower leg  3/20 superficial basal cell carcinoma glabella status post Mohs 6/1/2020 (COVID)  5/20 left thenar forearm superficial basal cell carcinoma status post curettage  10/22Left vertex of scalp scarring alopecia consistent with lichen plano pilaris  3/24 upper spinal back dysplastic nevus, mild  4/24 vertex of scalp lichen plano pilaris/end-stage scarring alopecia  4/24 right inferior cheek superficial basal cell carcinoma-Mohs scheduled      Seborrheic dermatitis-triamcinolone 0.025% cream or Elidel 1% cream     Rosacea-MetroGel 0.75%     Family history positive for

## 2025-04-02 LAB — DERMATOLOGY PATHOLOGY REPORT: ABNORMAL

## 2025-04-06 ENCOUNTER — RESULTS FOLLOW-UP (OUTPATIENT)
Age: 65
End: 2025-04-06

## 2025-04-07 NOTE — TELEPHONE ENCOUNTER
Patient called and would like to get scheduled to see Dr. Hagan this week if possible.    [phone]

## 2025-04-14 RX ORDER — TRIAMTERENE AND HYDROCHLOROTHIAZIDE 37.5; 25 MG/1; MG/1
1 CAPSULE ORAL DAILY
Qty: 30 CAPSULE | Refills: 0 | Status: SHIPPED | OUTPATIENT
Start: 2025-04-14

## 2025-04-14 NOTE — TELEPHONE ENCOUNTER
Medication:   Requested Prescriptions      No prescriptions requested or ordered in this encounter          Patient Phone Number: [phone] (home)     Last appt: 2/27/2025   Next appt: Visit date not found    Last OARRS:       2/4/2016    12:51 PM   RX Monitoring   Attestation The Prescription Monitoring Report for this patient was reviewed today.   Periodic Controlled Substance Monitoring No signs of potential drug abuse or diversion identified.     PDMP Monitoring:    Last PDMP [place] as Reviewed ([place]):  Review User Review Instant Review Result   OBI SEARS 2/27/2025  1:18 PM Reviewed PDMP [1]     Preferred Pharmacy:   [id number] - [place], [place] - [address] - P [phone] - F [phone]  [facility] [address]  [facility] [address]  Phone: [phone] Fax: [phone]    [facility] - [place], [place] - [address] - P [phone] - F [phone] [address]  [facility] [address]  Phone: [phone] Fax: [phone]    [facility] - [place], [place] - [address] [place] [address] - P [phone] - F [phone]  [facility] [address]  Phone: [phone] Fax: [phone]

## 2025-04-15 ENCOUNTER — TELEPHONE (OUTPATIENT)
Dept: FAMILY MEDICINE CLINIC | Age: 65
End: 2025-04-15

## 2025-04-15 NOTE — TELEPHONE ENCOUNTER
Pt called in stating that the pharmacy hasn't received the prescription.  I advised her that it was sent over yesterday.  She is going to call the pharmacy.

## 2025-04-17 ENCOUNTER — PROCEDURE VISIT (OUTPATIENT)
Age: 65
End: 2025-04-17

## 2025-04-17 DIAGNOSIS — D03.62 MELANOMA IN SITU OF LEFT UPPER ARM (HCC): Primary | ICD-10-CM

## 2025-04-17 RX ORDER — TRIAMCINOLONE ACETONIDE 1 MG/G
CREAM TOPICAL
Qty: 45 G | Refills: 1 | Status: SHIPPED | OUTPATIENT
Start: 2025-04-17

## 2025-04-17 NOTE — PROGRESS NOTES
[facility] D[facility]at[facility]  Beulah Hagan M.D.  [phone]       Smita Drew  1960    64 y.o. female     Date of Visit: 4/17/2025    Chief Complaint:   Chief Complaint   Patient presents with    Procedure        I was asked to see this patient by Dr. Venegas ref. provider found.    History of Present Illness:  1.  Patient presents today for excision of melanoma in situ arising within a dysplastic nevus left lateral upper arm.    No history of heart valve abnormality, pacemaker, not anticoagulated    Patient also notes new rash-has been present on and off for the last couple of months but was not present at her last exam.  Pruritic.  Not previously treated.  Limited to her left upper chest    Watches 6 grandchildren every day during the summer, taking care of parents in her 90s-dementia     Skin history:  History of basal cell carcinoma left chest and right forearm      12/16 Cutaneous sarcoid with no evidence of systemic involvement. Developed at site of IV. Tissue cultures negative.  5/17 superficial basal cell carcinoma central chest status post curettage  3/20 superficial basal cell carcinoma left medial lower leg  3/20 superficial basal cell carcinoma right medial lower leg  3/20 superficial basal cell carcinoma glabella status post Mohs 6/1/2020 (COVID)  5/20 left thenar forearm superficial basal cell carcinoma status post curettage  10/22Left vertex of scalp scarring alopecia consistent with lichen plano pilaris  3/24 upper spinal back dysplastic nevus, mild  4/24 vertex of scalp lichen plano pilaris/end-stage scarring alopecia  4/24 right inferior cheek superficial basal cell carcinoma-Mohs scheduled      Seborrheic dermatitis-triamcinolone 0.025% cream or Elidel 1% cream     Rosacea-MetroGel 0.75%     Family history positive for nonmelanoma skin cancer in both parents       Review of Systems:  Constitutional: Reports general sense of well-being       Past Medical History, Surgical History, Family History,

## 2025-04-22 LAB — DERMATOLOGY PATHOLOGY REPORT: NORMAL

## 2025-04-28 ENCOUNTER — CLINICAL SUPPORT (OUTPATIENT)
Age: 65
End: 2025-04-28

## 2025-04-28 DIAGNOSIS — T81.49XA SURGICAL SITE INFECTION: Primary | ICD-10-CM

## 2025-04-28 DIAGNOSIS — E78.00 HYPERCHOLESTEREMIA: ICD-10-CM

## 2025-04-28 DIAGNOSIS — D50.9 IRON DEFICIENCY ANEMIA, UNSPECIFIED IRON DEFICIENCY ANEMIA TYPE: ICD-10-CM

## 2025-04-28 RX ORDER — DOXYCYCLINE HYCLATE 100 MG
100 TABLET ORAL 2 TIMES DAILY
Qty: 20 TABLET | Refills: 0 | Status: SHIPPED | OUTPATIENT
Start: 2025-04-28 | End: 2025-05-08

## 2025-04-28 RX ORDER — MUPIROCIN 20 MG/G
OINTMENT TOPICAL
Qty: 22 G | Refills: 0 | Status: SHIPPED | OUTPATIENT
Start: 2025-04-28

## 2025-04-28 SDOH — HEALTH STABILITY: PHYSICAL HEALTH: ON AVERAGE, HOW MANY DAYS PER WEEK DO YOU ENGAGE IN MODERATE TO STRENUOUS EXERCISE (LIKE A BRISK WALK)?: 7 DAYS

## 2025-04-28 SDOH — HEALTH STABILITY: PHYSICAL HEALTH: ON AVERAGE, HOW MANY MINUTES DO YOU ENGAGE IN EXERCISE AT THIS LEVEL?: 60 MIN

## 2025-04-28 NOTE — PROGRESS NOTES
Subjective:  Complains of increasing pain at surgical site from excision of melanoma in situ about 10 days ago.     No fevers.     Exam:   Left upper arm with a well-approximated surgical wound with surrounding erythema, induration and warmth.        A/P:   Surgical site infection     Doxycycline 100 mg twice daily for 10 days.    Mupirocin ointment twice daily.

## 2025-04-29 ENCOUNTER — RESULTS FOLLOW-UP (OUTPATIENT)
Dept: FAMILY MEDICINE CLINIC | Age: 65
End: 2025-04-29

## 2025-04-29 LAB
ALBUMIN SERPL-MCNC: 4.2 G/DL (ref 3.4–5)
ALBUMIN/GLOB SERPL: 1.8 {RATIO} (ref 1.1–2.2)
ALP SERPL-CCNC: 72 U/L (ref 40–129)
ALT SERPL-CCNC: 31 U/L (ref 10–40)
ANION GAP SERPL CALCULATED.3IONS-SCNC: 10 MMOL/L (ref 3–16)
AST SERPL-CCNC: 40 U/L (ref 15–37)
BILIRUB SERPL-MCNC: 0.4 MG/DL (ref 0–1)
BUN SERPL-MCNC: 16 MG/DL (ref 7–20)
CALCIUM SERPL-MCNC: 9.8 MG/DL (ref 8.3–10.6)
CHLORIDE SERPL-SCNC: 101 MMOL/L (ref 99–110)
CHOLEST SERPL-MCNC: 149 MG/DL (ref 0–199)
CO2 SERPL-SCNC: 30 MMOL/L (ref 21–32)
CREAT SERPL-MCNC: 0.7 MG/DL (ref 0.6–1.2)
DEPRECATED RDW RBC AUTO: 13.2 % (ref 12.4–15.4)
GFR SERPLBLD CREATININE-BSD FMLA CKD-EPI: >90 ML/MIN/{1.73_M2}
GLUCOSE SERPL-MCNC: 86 MG/DL (ref 70–99)
HCT VFR BLD AUTO: 39.2 % (ref 36–48)
HDLC SERPL-MCNC: 48 MG/DL (ref 40–60)
HGB BLD-MCNC: 13.6 G/DL (ref 12–16)
IRON SATN MFR SERPL: 26 % (ref 15–50)
IRON SERPL-MCNC: 81 UG/DL (ref 37–145)
LDLC SERPL CALC-MCNC: 63 MG/DL
MCH RBC QN AUTO: 28.3 PG (ref 26–34)
MCHC RBC AUTO-ENTMCNC: 34.6 G/DL (ref 31–36)
MCV RBC AUTO: 81.9 FL (ref 80–100)
PLATELET # BLD AUTO: 295 K/UL (ref 135–450)
PMV BLD AUTO: 7.6 FL (ref 5–10.5)
POTASSIUM SERPL-SCNC: 3.4 MMOL/L (ref 3.5–5.1)
PROT SERPL-MCNC: 6.6 G/DL (ref 6.4–8.2)
RBC # BLD AUTO: 4.79 M/UL (ref 4–5.2)
SODIUM SERPL-SCNC: 141 MMOL/L (ref 136–145)
TIBC SERPL-MCNC: 317 UG/DL (ref 260–445)
TRIGL SERPL-MCNC: 191 MG/DL (ref 0–150)
VLDLC SERPL CALC-MCNC: 38 MG/DL
WBC # BLD AUTO: 4.1 K/UL (ref 4–11)

## 2025-05-01 ENCOUNTER — OFFICE VISIT (OUTPATIENT)
Dept: FAMILY MEDICINE CLINIC | Age: 65
End: 2025-05-01
Payer: MEDICARE

## 2025-05-01 VITALS
WEIGHT: 153.4 LBS | OXYGEN SATURATION: 97 % | DIASTOLIC BLOOD PRESSURE: 80 MMHG | HEART RATE: 71 BPM | SYSTOLIC BLOOD PRESSURE: 120 MMHG | HEIGHT: 63 IN | BODY MASS INDEX: 27.18 KG/M2

## 2025-05-01 DIAGNOSIS — E87.6 CHRONIC HYPOKALEMIA: ICD-10-CM

## 2025-05-01 DIAGNOSIS — Z76.89 ENCOUNTER TO ESTABLISH CARE WITH NEW DOCTOR: Primary | ICD-10-CM

## 2025-05-01 DIAGNOSIS — I10 ESSENTIAL HYPERTENSION: ICD-10-CM

## 2025-05-01 DIAGNOSIS — E78.2 MIXED HYPERLIPIDEMIA: ICD-10-CM

## 2025-05-01 DIAGNOSIS — C44.91 BASAL CELL CARCINOMA (BCC), UNSPECIFIED SITE: ICD-10-CM

## 2025-05-01 DIAGNOSIS — Z82.49 FAMILY HISTORY OF HEART DISEASE IN FEMALE FAMILY MEMBER BEFORE AGE 65: ICD-10-CM

## 2025-05-01 DIAGNOSIS — Z85.9: ICD-10-CM

## 2025-05-01 DIAGNOSIS — K21.9 GASTROESOPHAGEAL REFLUX DISEASE WITHOUT ESOPHAGITIS: ICD-10-CM

## 2025-05-01 DIAGNOSIS — C43.62 MALIGNANT MELANOMA OF LEFT UPPER EXTREMITY INCLUDING SHOULDER (HCC): ICD-10-CM

## 2025-05-01 DIAGNOSIS — Z79.899 LONG-TERM CURRENT USE OF PROTON PUMP INHIBITOR THERAPY: ICD-10-CM

## 2025-05-01 PROCEDURE — G8427 DOCREV CUR MEDS BY ELIG CLIN: HCPCS | Performed by: FAMILY MEDICINE

## 2025-05-01 PROCEDURE — 1090F PRES/ABSN URINE INCON ASSESS: CPT | Performed by: FAMILY MEDICINE

## 2025-05-01 PROCEDURE — G8399 PT W/DXA RESULTS DOCUMENT: HCPCS | Performed by: FAMILY MEDICINE

## 2025-05-01 PROCEDURE — 3074F SYST BP LT 130 MM HG: CPT | Performed by: FAMILY MEDICINE

## 2025-05-01 PROCEDURE — 1036F TOBACCO NON-USER: CPT | Performed by: FAMILY MEDICINE

## 2025-05-01 PROCEDURE — 3079F DIAST BP 80-89 MM HG: CPT | Performed by: FAMILY MEDICINE

## 2025-05-01 PROCEDURE — 3017F COLORECTAL CA SCREEN DOC REV: CPT | Performed by: FAMILY MEDICINE

## 2025-05-01 PROCEDURE — 99214 OFFICE O/P EST MOD 30 MIN: CPT | Performed by: FAMILY MEDICINE

## 2025-05-01 PROCEDURE — 1123F ACP DISCUSS/DSCN MKR DOCD: CPT | Performed by: FAMILY MEDICINE

## 2025-05-01 PROCEDURE — G8419 CALC BMI OUT NRM PARAM NOF/U: HCPCS | Performed by: FAMILY MEDICINE

## 2025-05-01 RX ORDER — TRIAMTERENE AND HYDROCHLOROTHIAZIDE 37.5; 25 MG/1; MG/1
1 CAPSULE ORAL DAILY
Qty: 90 CAPSULE | Refills: 3 | Status: SHIPPED | OUTPATIENT
Start: 2025-05-01

## 2025-05-01 RX ORDER — ROSUVASTATIN CALCIUM 10 MG/1
10 TABLET, COATED ORAL NIGHTLY
Qty: 90 TABLET | Refills: 3 | Status: SHIPPED | OUTPATIENT
Start: 2025-05-01

## 2025-05-01 ASSESSMENT — ENCOUNTER SYMPTOMS
RHINORRHEA: 0
DIARRHEA: 0
NAUSEA: 0
VOMITING: 0
WHEEZING: 0
SINUS PRESSURE: 0
BLOOD IN STOOL: 0
TROUBLE SWALLOWING: 0
COUGH: 0
SHORTNESS OF BREATH: 0
ABDOMINAL DISTENTION: 0
CONSTIPATION: 0
ABDOMINAL PAIN: 0
BACK PAIN: 0
CHEST TIGHTNESS: 0

## 2025-05-14 ENCOUNTER — OFFICE VISIT (OUTPATIENT)
Age: 65
End: 2025-05-14
Payer: MEDICARE

## 2025-05-14 DIAGNOSIS — D22.9 BENIGN NEVUS: ICD-10-CM

## 2025-05-14 DIAGNOSIS — D03.62 MELANOMA IN SITU OF LEFT UPPER ARM (HCC): ICD-10-CM

## 2025-05-14 DIAGNOSIS — L66.9 ALOPECIA, SCARRING: Primary | ICD-10-CM

## 2025-05-14 DIAGNOSIS — L57.0 AK (ACTINIC KERATOSIS): ICD-10-CM

## 2025-05-14 DIAGNOSIS — Z85.828 HISTORY OF BASAL CELL CANCER: ICD-10-CM

## 2025-05-14 PROCEDURE — 1090F PRES/ABSN URINE INCON ASSESS: CPT | Performed by: DERMATOLOGY

## 2025-05-14 PROCEDURE — G8419 CALC BMI OUT NRM PARAM NOF/U: HCPCS | Performed by: DERMATOLOGY

## 2025-05-14 PROCEDURE — 99213 OFFICE O/P EST LOW 20 MIN: CPT | Performed by: DERMATOLOGY

## 2025-05-14 PROCEDURE — 11900 INJECT SKIN LESIONS </W 7: CPT | Performed by: DERMATOLOGY

## 2025-05-14 PROCEDURE — 1036F TOBACCO NON-USER: CPT | Performed by: DERMATOLOGY

## 2025-05-14 PROCEDURE — 17000 DESTRUCT PREMALG LESION: CPT | Performed by: DERMATOLOGY

## 2025-05-14 PROCEDURE — 1123F ACP DISCUSS/DSCN MKR DOCD: CPT | Performed by: DERMATOLOGY

## 2025-05-14 PROCEDURE — 3017F COLORECTAL CA SCREEN DOC REV: CPT | Performed by: DERMATOLOGY

## 2025-05-14 PROCEDURE — G8399 PT W/DXA RESULTS DOCUMENT: HCPCS | Performed by: DERMATOLOGY

## 2025-05-14 PROCEDURE — 99212 OFFICE O/P EST SF 10 MIN: CPT | Performed by: DERMATOLOGY

## 2025-05-14 PROCEDURE — G8427 DOCREV CUR MEDS BY ELIG CLIN: HCPCS | Performed by: DERMATOLOGY

## 2025-05-14 RX ADMIN — TRIAMCINOLONE ACETONIDE 0.2 MG: 10 INJECTION, SUSPENSION INTRA-ARTICULAR; INTRALESIONAL at 09:08

## 2025-05-14 NOTE — PROGRESS NOTES
[facility]ermat[facility]  Beulah Hagan M.D.  [phone]       Smita Drew  1960    64 y.o. female     Date of Visit: 5/14/2025    Chief Complaint:   Chief Complaint   Patient presents with    Skin Lesion        I was asked to see this patient by Dr. Venegas ref. provider found.    History of Present Illness:  1.  Follow-up-scalp doing very well, 1 small focus of scale at margin of larger area of alopecia vertex of scalp, otherwise quiescent    Dry papule left dorsal forearm.  Not itching or bleeding but recently became irritated in the sun    Brown papule left forearm.  Not changing in size, shape, color.  Asymptomatic    Status post excision melanoma in situ left upper arm.  Developed surgical site infection but healed well.         Watches 6 grandchildren every day during the summer, taking care of parents in her 90s-dementia     Skin history:  History of basal cell carcinoma left chest and right forearm      12/16 Cutaneous sarcoid with no evidence of systemic involvement. Developed at site of IV. Tissue cultures negative.  5/17 superficial basal cell carcinoma central chest status post curettage  3/20 superficial basal cell carcinoma left medial lower leg  3/20 superficial basal cell carcinoma right medial lower leg  3/20 superficial basal cell carcinoma glabella status post Mohs 6/1/2020 (COVID)  5/20 left thenar forearm superficial basal cell carcinoma status post curettage  10/22Left vertex of scalp scarring alopecia consistent with lichen plano pilaris  3/24 upper spinal back dysplastic nevus, mild  4/24 vertex of scalp lichen plano pilaris/end-stage scarring alopecia  4/24 right inferior cheek superficial basal cell carcinoma  3/25 melanoma in situ left lateral upper arm status post wide local excision        Seborrheic dermatitis-triamcinolone 0.025% cream or Elidel 1% cream     Rosacea-MetroGel 0.75%     Family history positive for nonmelanoma skin cancer in both parents  Review of

## 2025-06-30 ENCOUNTER — TELEPHONE (OUTPATIENT)
Age: 65
End: 2025-06-30

## 2025-06-30 NOTE — TELEPHONE ENCOUNTER
Pt called, she  states she has a bite on her side by her waist  that has gotten bigger. Pt is requesting an appt.

## 2025-07-15 ENCOUNTER — PATIENT MESSAGE (OUTPATIENT)
Dept: FAMILY MEDICINE CLINIC | Age: 65
End: 2025-07-15

## 2025-07-15 DIAGNOSIS — E87.6 CHRONIC HYPOKALEMIA: Primary | ICD-10-CM

## 2025-07-16 RX ORDER — POTASSIUM CHLORIDE 1500 MG/1
20 TABLET, EXTENDED RELEASE ORAL DAILY
Qty: 90 TABLET | Refills: 3 | Status: SHIPPED | OUTPATIENT
Start: 2025-07-16

## 2025-07-23 ENCOUNTER — OFFICE VISIT (OUTPATIENT)
Age: 65
End: 2025-07-23
Payer: MEDICARE

## 2025-07-23 DIAGNOSIS — L57.8 DIFFUSE PHOTODAMAGE OF SKIN: ICD-10-CM

## 2025-07-23 DIAGNOSIS — D48.5 NEOPLASM OF UNCERTAIN BEHAVIOR OF SKIN: Primary | ICD-10-CM

## 2025-07-23 DIAGNOSIS — Z85.820 HISTORY OF MELANOMA: ICD-10-CM

## 2025-07-23 DIAGNOSIS — Z85.828 HISTORY OF BASAL CELL CANCER: ICD-10-CM

## 2025-07-23 DIAGNOSIS — D22.9 BENIGN NEVUS: ICD-10-CM

## 2025-07-23 DIAGNOSIS — L66.9 ALOPECIA, SCARRING: ICD-10-CM

## 2025-07-23 PROCEDURE — 11102 TANGNTL BX SKIN SINGLE LES: CPT | Performed by: DERMATOLOGY

## 2025-07-23 PROCEDURE — 99212 OFFICE O/P EST SF 10 MIN: CPT | Performed by: DERMATOLOGY

## 2025-07-23 NOTE — PROGRESS NOTES
[facility] Dermatology  Beulah Hagan M.D.  [phone]       Smita Drew  1960    65 y.o. female     Date of Visit: 7/23/2025    Chief Complaint:   Chief Complaint   Patient presents with    Skin Lesion        I was asked to see this patient by Dr. Venegas ref. provider found.    History of Present Illness:  1. TBSE    Multiple nevi.   Stable in size, shape, color. Not itching, bleeding.  Patient has noted a new or changing pigmented lesion left thenar forearm-new hypopigmentation closely adjacent to brown papule    Scarring alopecia doing very well-no areas of activity that patient has noted    Photodamage.  Progressive freckling and lentigines of sun exposed areas.  Patient is wearing hats and sunscreen consistently.       Watches 6 grandchildren every day during the summer, taking care of parents in her 90s-dementia     Skin history:  History of basal cell carcinoma left chest and right forearm      12/16 Cutaneous sarcoid with no evidence of systemic involvement. Developed at site of IV. Tissue cultures negative.  5/17 superficial basal cell carcinoma central chest status post curettage  3/20 superficial basal cell carcinoma left medial lower leg  3/20 superficial basal cell carcinoma right medial lower leg  3/20 superficial basal cell carcinoma glabella status post Mohs 6/1/2020 (COVID)  5/20 left thenar forearm superficial basal cell carcinoma status post curettage  10/22Left vertex of scalp scarring alopecia consistent with lichen plano pilaris  3/24 upper spinal back dysplastic nevus, mild  4/24 vertex of scalp lichen plano pilaris/end-stage scarring alopecia  4/24 right inferior cheek superficial basal cell carcinoma  3/25 melanoma in situ left lateral upper arm status post wide local excision         Seborrheic dermatitis-triamcinolone 0.025% cream or Elidel 1% cream     Rosacea-MetroGel 0.75%     Family history positive for nonmelanoma skin cancer in both parents  Review of

## 2025-07-28 ENCOUNTER — RESULTS FOLLOW-UP (OUTPATIENT)
Age: 65
End: 2025-07-28

## 2025-07-28 LAB — DERMATOLOGY PATHOLOGY REPORT: NORMAL

## 2025-07-31 ENCOUNTER — OFFICE VISIT (OUTPATIENT)
Dept: SURGERY | Age: 65
End: 2025-07-31
Payer: MEDICARE

## 2025-07-31 VITALS — BODY MASS INDEX: 26.36 KG/M2 | WEIGHT: 148.8 LBS | SYSTOLIC BLOOD PRESSURE: 123 MMHG | DIASTOLIC BLOOD PRESSURE: 68 MMHG

## 2025-07-31 DIAGNOSIS — C18.2 CANCER OF ASCENDING COLON (HCC): Primary | ICD-10-CM

## 2025-07-31 PROCEDURE — 99213 OFFICE O/P EST LOW 20 MIN: CPT | Performed by: SURGERY

## 2025-07-31 PROCEDURE — 3017F COLORECTAL CA SCREEN DOC REV: CPT | Performed by: SURGERY

## 2025-07-31 PROCEDURE — G8419 CALC BMI OUT NRM PARAM NOF/U: HCPCS | Performed by: SURGERY

## 2025-07-31 PROCEDURE — 1126F AMNT PAIN NOTED NONE PRSNT: CPT | Performed by: SURGERY

## 2025-07-31 PROCEDURE — 1123F ACP DISCUSS/DSCN MKR DOCD: CPT | Performed by: SURGERY

## 2025-07-31 PROCEDURE — G8427 DOCREV CUR MEDS BY ELIG CLIN: HCPCS | Performed by: SURGERY

## 2025-07-31 PROCEDURE — G8399 PT W/DXA RESULTS DOCUMENT: HCPCS | Performed by: SURGERY

## 2025-07-31 PROCEDURE — 3074F SYST BP LT 130 MM HG: CPT | Performed by: SURGERY

## 2025-07-31 PROCEDURE — 3078F DIAST BP <80 MM HG: CPT | Performed by: SURGERY

## 2025-07-31 PROCEDURE — 1090F PRES/ABSN URINE INCON ASSESS: CPT | Performed by: SURGERY

## 2025-07-31 PROCEDURE — 1036F TOBACCO NON-USER: CPT | Performed by: SURGERY

## 2025-07-31 PROCEDURE — 1159F MED LIST DOCD IN RCRD: CPT | Performed by: SURGERY

## 2025-07-31 NOTE — PROGRESS NOTES
[facility] GENERAL AND LAPAROSCOPIC SURGERY          PATIENT NAME: Smita Drew     TODAY'S DATE: 7/31/2025    CC: Colon cancer  SUBJECTIVE:    Pt feeling well, no new concerns, no N/V. Normal BM, no bleeding noted, no abd pain. Stable weight.   No CP or SOB.  OBJECTIVE:  VITALS:  Wt 67.5 kg (148 lb 12.8 oz)   BMI 26.36 kg/m²     CONSTITUTIONAL:  awake and alert  LUNGS:  clear to auscultation  HEART: RRR  ABDOMEN:  normal bowel sounds, soft, non-distended, non-tender, no mass, no hernia  LYMPH: Neck and groin areas negative     Data:    LABS:   Latest Reference Range & Units 04/28/25 10:22   Sodium 136 - 145 mmol/L 141   Potassium 3.5 - 5.1 mmol/L 3.4 (L)   Chloride 99 - 110 mmol/L 101   CARBON DIOXIDE 21 - 32 mmol/L 30   BUN,BUNPL 7 - 20 mg/dL 16   Creatinine 0.6 - 1.2 mg/dL 0.7   Anion Gap 3 - 16  10   Est, Glom Filt Rate >60  >90   Glucose 70 - 99 mg/dL 86   Calcium 8.3 - 10.6 mg/dL 9.8   Total Protein 6.4 - 8.2 g/dL 6.6   Cholesterol, Total 0 - 199 mg/dL 149   HDL Cholesterol 40 - 60 mg/dL 48   LDL Cholesterol <100 mg/dL 63   Triglycerides 0 - 150 mg/dL 191 (H)   VLDL Not Established mg/dL 38   Albumin 3.4 - 5.0 g/dL 4.2   Albumin/Globulin Ratio 1.1 - 2.2  1.8   Alkaline Phosphatase 40 - 129 U/L 72   ALT 10 - 40 U/L 31   AST 15 - 37 U/L 40 (H)   Total Bilirubin 0.0 - 1.0 mg/dL 0.4   WBC 4.0 - 11.0 K/uL 4.1   RBC 4.00 - 5.20 M/uL 4.79   Hemoglobin Quant 12.0 - 16.0 g/dL 13.6   Hematocrit 36.0 - 48.0 % 39.2   MCV 80.0 - 100.0 fL 81.9   MCH 26.0 - 34.0 pg 28.3   MCHC 31.0 - 36.0 g/dL 34.6   MPV 5.0 - 10.5 fL 7.6   RDW 12.4 - 15.4 % 13.2   Platelet Count 135 - 450 K/uL 295   (L): Data is abnormally low  (H): Data is abnormally high      Radiology Review:   EXAM: CT CHEST ABDOMEN AND PELVIS     INDICATION: Restaging colon cancer..Malignant neoplasm of ascending colon     COMPARISON: 10/24/2022.     TECHNIQUE: Axial CT imaging obtained through the chest, abdomen and pelvis.  Axial images and multiplanar

## (undated) DEVICE — FAIRFIELD KNEE LF

## (undated) DEVICE — DYONICS 25 PATIENT TUBE SET MUST                                    BE USED WITH 7211007, 12 PER BOX

## (undated) DEVICE — HANDPIECE SET WITH HIGH FLOW TIP AND SUCTION TUBE: Brand: INTERPULSE

## (undated) DEVICE — SPONGE LAP W18XL18IN WHT COT 4 PLY FLD STRUNG RADPQ DISP ST 2 PER PACK

## (undated) DEVICE — HOOD: Brand: FLYTE

## (undated) DEVICE — 450 ML BOTTLE OF 0.05% CHLORHEXIDINE GLUCONATE IN 99.95% STERILE WATER FOR IRRIGATION, USP AND APPLICATOR.: Brand: IRRISEPT ANTIMICROBIAL WOUND LAVAGE

## (undated) DEVICE — SOLUTION IRRIG 3000ML 0.9% SOD CHL USP UROMATIC PLAS CONT

## (undated) DEVICE — WEREWOLF FLOW 50 COBLATION WAND: Brand: COBLATION

## (undated) DEVICE — RECIPROCATING BLADE, DOUBLE SIDED, OFFSET  (70.0 X 0.64 X 12.6MM)

## (undated) DEVICE — HOOD, PEEL-AWAY: Brand: FLYTE

## (undated) DEVICE — CONTAINER,SPECIMEN,OR STERILE,4OZ: Brand: MEDLINE

## (undated) DEVICE — SUTURE VCRL + SZ 2-0 L36IN ABSRB UD L36MM CT-1 1/2 CIR VCP945H

## (undated) DEVICE — HYPODERMIC SAFETY NEEDLE: Brand: MAGELLAN

## (undated) DEVICE — DUAL CUT SAGITTAL BLADE

## (undated) DEVICE — DRESSING CNTCT 4X12IN IS THERABOND 3D

## (undated) DEVICE — SYRINGE MED 30ML STD CLR PLAS LUERLOCK TIP N CTRL DISP

## (undated) DEVICE — GLOVE ORANGE PI 8 1/2   MSG9085

## (undated) DEVICE — STERILE TOTAL KNEE DRAPE PACK: Brand: CARDINAL HEALTH

## (undated) DEVICE — ELECTRODE PT RET AD L9FT HI MOIST COND ADH HYDRGEL CORDED

## (undated) DEVICE — APPLICATOR PREP 26ML 0.7% IOD POVACRYLEX 74% ISO ALC ST

## (undated) DEVICE — GLOVE ORANGE PI 8   MSG9080

## (undated) DEVICE — GLOVE SURG SZ 9 THK91MIL LTX FREE SYN POLYISOPRENE ANTI

## (undated) DEVICE — PENCIL SMK EVAC TELSCP 3 M TBNG

## (undated) DEVICE — APPLICATOR MEDICATED 26 CC SOLUTION HI LT ORNG CHLORAPREP

## (undated) DEVICE — 3M™ STERI-DRAPE™ U-DRAPE 1015: Brand: STERI-DRAPE™

## (undated) DEVICE — SUTURE MCRYL + SZ 4-0 L27IN ABSRB UD L19MM PS-2 3/8 CIR MCP426H

## (undated) DEVICE — ADHESIVE SKIN CLOSURE WND 8.661X1.5 IN 22 CM LIQUIBAND SECUR

## (undated) DEVICE — GLOVE ORTHO 8   MSG9480

## (undated) DEVICE — 3.5 MM FULL RADIUS ELITE STRAIGHT                                    DISPOSABLE BLADES, BEIGE,PACKAGED 6                                    PER BOX, STERILE

## (undated) DEVICE — ATTUNE SOLO PINNING SYSTEM

## (undated) DEVICE — SHEET,DRAPE,53X77,STERILE: Brand: MEDLINE

## (undated) DEVICE — Device

## (undated) DEVICE — ZIMMER® STERILE DISPOSABLE TOURNIQUET CUFF WITH PLC, DUAL PORT, SINGLE BLADDER, 30 IN. (76 CM)

## (undated) DEVICE — TOTAL BASIC PK

## (undated) DEVICE — GOWN,AURORA,NONREINF,RAGLAN,XXL,STERILE: Brand: MEDLINE

## (undated) DEVICE — FLEXIBLE ADHESIVE BANDAGE: Brand: CURITY

## (undated) DEVICE — 3 BONE CEMENT MIXER: Brand: MIXEVAC

## (undated) DEVICE — GOWN SIRUS NONREIN XL W/TWL: Brand: MEDLINE INDUSTRIES, INC.

## (undated) DEVICE — KNEE HOLDER DISPOSABLE LINER: Brand: ALVARADO®  KNEE SUPPORT

## (undated) DEVICE — MASC TURNOVER KIT: Brand: MEDLINE INDUSTRIES, INC.

## (undated) DEVICE — SUTURE VCRL SZ 0 L36IN ABSRB UD CT-1 L36MM 1/2 CIR TAPR PNT VCP946H

## (undated) DEVICE — SUTURE ETHLN SZ 4-0 L18IN NONABSORBABLE BLK L19MM PS-2 3/8 1667H

## (undated) DEVICE — GAUZE,SPONGE,4"X4",8PLY,STRL,LF,10/TRAY: Brand: MEDLINE

## (undated) DEVICE — SYRINGE MED 50ML LUERLOCK TIP

## (undated) DEVICE — ROOM TURNOVER KIT W/ ARM STRP

## (undated) DEVICE — SUTURE STRATAFIX SZ 1 L14IN ABSRB VLT L36MM MO-4 TAPERPOINT SXPD2B400